# Patient Record
Sex: FEMALE | Race: BLACK OR AFRICAN AMERICAN | NOT HISPANIC OR LATINO | Employment: FULL TIME | URBAN - METROPOLITAN AREA
[De-identification: names, ages, dates, MRNs, and addresses within clinical notes are randomized per-mention and may not be internally consistent; named-entity substitution may affect disease eponyms.]

---

## 2018-09-24 ENCOUNTER — OFFICE VISIT (OUTPATIENT)
Dept: OPTOMETRY | Facility: CLINIC | Age: 38
End: 2018-09-24
Payer: COMMERCIAL

## 2018-09-24 DIAGNOSIS — H16.201 KERATOCONJUNCTIVITIS OF RIGHT EYE: Primary | ICD-10-CM

## 2018-09-24 PROCEDURE — 92002 INTRM OPH EXAM NEW PATIENT: CPT | Mod: S$GLB,,, | Performed by: OPTOMETRIST

## 2018-09-24 PROCEDURE — 99999 PR PBB SHADOW E&M-NEW PATIENT-LVL II: CPT | Mod: PBBFAC,,, | Performed by: OPTOMETRIST

## 2018-09-24 RX ORDER — FLUOROMETHOLONE 1 MG/ML
1 SUSPENSION/ DROPS OPHTHALMIC 4 TIMES DAILY
Qty: 5 ML | Refills: 0 | Status: SHIPPED | OUTPATIENT
Start: 2018-09-24 | End: 2018-09-28

## 2018-09-24 NOTE — PROGRESS NOTES
HPI     Patient states she woke up around 1:30am with fbs. Since then her right   eye began to swell, pain. 7/10    Last edited by Tracee Paz on 9/24/2018  1:39 PM. (History)            Assessment /Plan     For exam results, see Encounter Report.    Keratoconjunctivitis of right eye    Other orders  -     fluorometholone 0.1% (FML) 0.1 % DrpS; Place 1 drop into the right eye 4 (four) times daily. for 4 days  Dispense: 5 mL; Refill: 0     Cold compresses QID   RTC Friday for annula/DFE

## 2018-09-26 ENCOUNTER — LAB VISIT (OUTPATIENT)
Dept: LAB | Facility: HOSPITAL | Age: 38
End: 2018-09-26
Attending: OBSTETRICS & GYNECOLOGY
Payer: COMMERCIAL

## 2018-09-26 ENCOUNTER — TELEPHONE (OUTPATIENT)
Dept: ADMINISTRATIVE | Facility: HOSPITAL | Age: 38
End: 2018-09-26

## 2018-09-26 ENCOUNTER — OFFICE VISIT (OUTPATIENT)
Dept: OBSTETRICS AND GYNECOLOGY | Facility: CLINIC | Age: 38
End: 2018-09-26
Payer: COMMERCIAL

## 2018-09-26 VITALS
WEIGHT: 293 LBS | HEIGHT: 68 IN | BODY MASS INDEX: 44.41 KG/M2 | SYSTOLIC BLOOD PRESSURE: 150 MMHG | DIASTOLIC BLOOD PRESSURE: 80 MMHG

## 2018-09-26 DIAGNOSIS — Z01.419 WELL WOMAN EXAM WITH ROUTINE GYNECOLOGICAL EXAM: ICD-10-CM

## 2018-09-26 DIAGNOSIS — N93.9 ABNORMAL UTERINE BLEEDING (AUB): ICD-10-CM

## 2018-09-26 DIAGNOSIS — Z01.419 WELL WOMAN EXAM WITH ROUTINE GYNECOLOGICAL EXAM: Primary | ICD-10-CM

## 2018-09-26 LAB
ANISOCYTOSIS BLD QL SMEAR: ABNORMAL
BASOPHILS # BLD AUTO: 0.04 K/UL
BASOPHILS NFR BLD: 0.7 %
DIFFERENTIAL METHOD: ABNORMAL
EOSINOPHIL # BLD AUTO: 0.2 K/UL
EOSINOPHIL NFR BLD: 3.9 %
ERYTHROCYTE [DISTWIDTH] IN BLOOD BY AUTOMATED COUNT: 17.5 %
HCT VFR BLD AUTO: 29.1 %
HGB BLD-MCNC: 8.5 G/DL
HYPOCHROMIA BLD QL SMEAR: ABNORMAL
LYMPHOCYTES # BLD AUTO: 1.8 K/UL
LYMPHOCYTES NFR BLD: 32 %
MCH RBC QN AUTO: 20.5 PG
MCHC RBC AUTO-ENTMCNC: 29.2 G/DL
MCV RBC AUTO: 70 FL
MONOCYTES # BLD AUTO: 0.4 K/UL
MONOCYTES NFR BLD: 7.3 %
NEUTROPHILS # BLD AUTO: 3.1 K/UL
NEUTROPHILS NFR BLD: 56.1 %
PLATELET # BLD AUTO: 316 K/UL
PMV BLD AUTO: 9.2 FL
RBC # BLD AUTO: 4.14 M/UL
TSH SERPL DL<=0.005 MIU/L-ACNC: 0.91 UIU/ML
WBC # BLD AUTO: 5.59 K/UL

## 2018-09-26 PROCEDURE — 99999 PR PBB SHADOW E&M-EST. PATIENT-LVL III: CPT | Mod: PBBFAC,,, | Performed by: OBSTETRICS & GYNECOLOGY

## 2018-09-26 PROCEDURE — 84443 ASSAY THYROID STIM HORMONE: CPT

## 2018-09-26 PROCEDURE — 88142 CYTOPATH C/V THIN LAYER: CPT

## 2018-09-26 PROCEDURE — 86592 SYPHILIS TEST NON-TREP QUAL: CPT

## 2018-09-26 PROCEDURE — 86780 TREPONEMA PALLIDUM: CPT

## 2018-09-26 PROCEDURE — 85025 COMPLETE CBC W/AUTO DIFF WBC: CPT

## 2018-09-26 PROCEDURE — 86593 SYPHILIS TEST NON-TREP QUANT: CPT

## 2018-09-26 PROCEDURE — 80074 ACUTE HEPATITIS PANEL: CPT

## 2018-09-26 PROCEDURE — 87624 HPV HI-RISK TYP POOLED RSLT: CPT

## 2018-09-26 PROCEDURE — 99385 PREV VISIT NEW AGE 18-39: CPT | Mod: 1E,GY,S$GLB, | Performed by: OBSTETRICS & GYNECOLOGY

## 2018-09-26 PROCEDURE — 87491 CHLMYD TRACH DNA AMP PROBE: CPT

## 2018-09-26 PROCEDURE — 99212 OFFICE O/P EST SF 10 MIN: CPT | Mod: 25,S$GLB,, | Performed by: OBSTETRICS & GYNECOLOGY

## 2018-09-26 PROCEDURE — 36415 COLL VENOUS BLD VENIPUNCTURE: CPT

## 2018-09-26 PROCEDURE — 86703 HIV-1/HIV-2 1 RESULT ANTBDY: CPT

## 2018-09-26 PROCEDURE — 87660 TRICHOMONAS VAGIN DIR PROBE: CPT

## 2018-09-26 NOTE — PROGRESS NOTES
History & Physical  Gynecology      SUBJECTIVE:     Chief Complaint: Well Woman and Menometrorrhagia (period 2 to 3 weeks, are heavy goes through 3 bags of pads per period. )     History of Present Illness:  Annual Exam-Premenopausal  Ms. Piña is a 38 year old P3 who presents for annual exam. The patient complains of heavy menses. She reports that in 2017 she requested starting Depo Provera shots to control bleeding as she was amenorrheic on Depo Provera in the past. She reports that she bled for 3 months so she did not return for a second shot. She was given pills for one month to stop bleeding at this time. Her LMP was 2018 and bled for 6 days. She uses 3 pads at a time and changes pads every hour to 30 minutes without blood clots. She reports that she was told that she was anemic recently and believes that it is secondary to menses. She reports that she would like definitive management to stop bleeding The patient is sexually active with one partner. GYN screening history: last pap: was normal. The patient wears seatbelts: yes. The patient participates in regular exercise: no. Has the patient ever been transfused or tattooed?: no. The patient reports that there is not domestic violence in her life.    No family history of colon/Uterine/Cervical Cancer  Breast Cancer: Mom 60. Maternal Aunt 40. Maternal Great Aunt but age unknown.      Review of patient's allergies indicates:   Allergen Reactions    Depo-provera contraceptive Blisters       Past Medical History:   Diagnosis Date    Hypertension     Migraines      Past Surgical History:   Procedure Laterality Date     SECTION, LOW TRANSVERSE      x3    TUBAL LIGATION       OB History      Para Term  AB Living    3 3 3     3    SAB TAB Ectopic Multiple Live Births            3        Family History   Problem Relation Age of Onset    Breast cancer Mother     Breast cancer Maternal Aunt     Breast cancer Maternal Aunt       Social History     Tobacco Use    Smoking status: Current Every Day Smoker    Smokeless tobacco: Never Used   Substance Use Topics    Alcohol use: Yes     Frequency: Monthly or less     Comment: social    Drug use: Yes     Types: Marijuana     Comment: very rare        Current Outpatient Medications   Medication Sig    ferrous sulfate, dried (IRON, DRIED, ORAL) Take 2 tablets by mouth once daily.    fluorometholone 0.1% (FML) 0.1 % DrpS Place 1 drop into the right eye 4 (four) times daily. for 4 days    LISINOPRIL ORAL Take 1 tablet by mouth 2 (two) times daily.    METOPROLOL TARTRATE ORAL Take 1 tablet by mouth once daily.    metaproterenol (ALUPENT) 10 mg tablet Take 10 mg by mouth 3 (three) times daily.     No current facility-administered medications for this visit.      Review of Systems:  Review of Systems   Constitutional: Negative for appetite change, chills, fever and unexpected weight change.   Eyes: Negative for visual disturbance.   Respiratory: Negative for cough and wheezing.    Cardiovascular: Negative for chest pain and palpitations.   Gastrointestinal: Negative for abdominal pain and vomiting.   Endocrine: Negative for diabetes.   Genitourinary: Positive for menorrhagia, menstrual problem and vaginal bleeding. Negative for dysuria, frequency, hematuria, pelvic pain, vaginal discharge and vaginal pain.   Skin:         Skin changes with depo provera   Neurological: Negative for headaches.      OBJECTIVE:     Physical Exam:  Physical Exam   Constitutional: She is oriented to person, place, and time. She appears well-developed and well-nourished.   Cardiovascular: Normal rate and normal heart sounds.   Pulmonary/Chest: Effort normal. No respiratory distress. Breasts are symmetrical. There is no breast swelling.   Abdominal: Soft. She exhibits no distension. There is no tenderness.   Genitourinary: Rectum normal and uterus normal. No breast tenderness, discharge or bleeding. Pelvic exam was  performed with patient supine. Cervix exhibits no motion tenderness. Right adnexum displays no fullness. Left adnexum displays no fullness. No bleeding in the vagina. No vaginal discharge found.   Genitourinary Comments: Uterus approximately 10 week size, mobile; difficult  2/2 body habitus with cervix anterior and deep   Neurological: She is oriented to person, place, and time.   Skin: Skin is warm and dry.   Psychiatric: She has a normal mood and affect.   Nursing note and vitals reviewed.    ASSESSMENT:       ICD-10-CM ICD-9-CM    1. Well woman exam with routine gynecological exam Z01.419 V72.31 Liquid-based pap smear, screening      HPV High Risk Genotypes, PCR      HIV-1 and HIV-2 antibodies      RPR      Hepatitis panel, acute      C. trachomatis/N. gonorrhoeae by AMP DNA      Vaginosis Screen by DNA Probe   2. Abnormal uterine bleeding (AUB) N93.9 626.9 US Transvaginal Non OB      TSH      CBC auto differential      Plan:     Bijal was seen today for well woman and menometrorrhagia.    Diagnoses and all orders for this visit:    Well woman exam with routine gynecological exam  -     Liquid-based pap smear, screening  -     HPV High Risk Genotypes, PCR  -     HIV-1 and HIV-2 antibodies; Future  -     RPR; Future  -     Hepatitis panel, acute; Future  -     C. trachomatis/N. gonorrhoeae by AMP DNA  -     Vaginosis Screen by DNA Probe    Abnormal uterine bleeding (AUB)  -     US Transvaginal Non OB; Future  -     TSH; Future  -     CBC auto differential; Future  - Will have patient return to clinic for EMBx. If EMBx cannot be done in office then will perform dx hysteroscopy D&C in OR.  - Patient >35 y.o. with HTN and smokes so would not put patient on estrogen based therapies. S/p tubal ligation and if tissue dx is negative will discuss endometrial ablation vs. hysterectomy.        Orders Placed This Encounter   Procedures    HPV High Risk Genotypes, PCR    C. trachomatis/N. gonorrhoeae by AMP DNA     Vaginosis Screen by DNA Probe    US Transvaginal Non OB    TSH    CBC auto differential    HIV-1 and HIV-2 antibodies    RPR    Hepatitis panel, acute       Follow-up in about 1 week (around 10/3/2018) for endometrial biopsy.    Counseling time: 30 minutes    Margaret Alexis

## 2018-09-26 NOTE — PATIENT INSTRUCTIONS
Heavy Menstrual Bleeding    Heavy menstrual bleeding means that your periods are heavier or longer than usual. You may soak through a pad or tampon every 1 to 3 hours on the heaviest days of your period. You may also pass large, dark clots. And your periods may last longer than 7 days.  If you have heavy periods often, this can cause a problem called anemia. With anemia, your red blood cell count is too low. Red blood cells are needed because they help carry oxygen throughout your body. Severe anemia may cause you to look pale and feel weak or tired. You might also become short of breath easily.  There are many possible causes of heavy menstrual bleeding. Hormonal imbalance is the most common cause. Having benign growths in your uterus, such as fibroids or polyps, is another cause. Taking certain medicines or having certain health problems or bleeding disorders are also causes.  To treat heavy menstrual bleeding, medicines are often tried first. If these dont help, further testing and treatments will likely be needed.  Home care  Medicines  If youre prescribed medicines, be sure to take them as directed.  · To help control heavy bleeding, any of the following may be used:  ¨ Hormone therapy (this includes all methods of hormonal birth control such as pills, shots, cream, ring, patch, or hormone-releasing IUD)  ¨ Nonsteroidal anti-inflammatory drugs (NSAIDs), such as ibuprofen  ¨ Antifibrinolytic medicines, such as transexamic acid  · To help treat anemia, iron supplements may be prescribed.            General care  · Get plenty of rest if you tire easily. Avoid heavy exertion.  · To help relieve pain or cramping, try using a heating pad on the lower belly or back. A warm bath may also help.  Follow-up care  Follow up with your healthcare provider as directed.  When to seek medical advice  Call your healthcare provider right away if any of these occur:  · Heavier bleeding (soaking 1 pad or tampon every hour for 3  hours)  · Heavy bleeding that lasts longer than 1 week  · Fever of 100.4ºF (38ºC) or higher, or as directed by your provider  · Pain or cramping that gets worse instead of better  · Signs of anemia such as pale skin, extreme fatigue or weakness, or shortness of breath  · Dizziness or fainting  Date Last Reviewed: 6/11/2015  © 5544-2666 [employer]. [address] [place] [address], [place] [address]. All rights reserved. This information is not intended as a substitute for professional medical care. Always follow your healthcare professional's instructions.        Prevention Guidelines, Women Ages 18 to 39  Screening tests and vaccines are an important part of managing your health. Health counseling is essential, too. Below are guidelines for these, for women ages 18 to 39. Talk with your healthcare provider to make sure youre up-to-date on what you need.  Screening Who needs it How often   Alcohol misuse All women in this age group At routine exams   Blood pressure All women in this age group Every 2 years if your blood pressure is less than 120/80 mm Hg; yearly if your systolic blood pressure is 120 to 139 mm Hg, or your diastolic blood pressure reading is 80 to 89 mm Hg   Breast cancer All women in this age group should talk with their healthcare providers about the need for clinical breast exams (CBE)1 Clinical breast exam every 3 years1   Cervical cancer Women ages 21 and older Women between ages 21 and 29 should have a Pap test every 3 years; women between ages 30 and 65 are advised to have a Pap test plus an HPV test every 5 years   Chlamydia Sexually active women ages 24 and younger, and women at increased risk for infection Every 3 years if you're at risk or have symptoms   Depression All women in this age group At routine exams   Diabetes mellitus, type 2 Adults with no symptoms who are overweight or obese and have 1 or more other risk factors for diabetes At least every 3 years   Gonorrhea Sexually  active women at increased risk for infection At routine exams   Hepatitis C Anyone at increased risk At routine exams   HIV All women At routine exams   Obesity All women in this age group At routine exams   Syphilis Women at increased risk for infection - talk with your healthcare provider At routine exams   Tuberculosis Women at increased risk for infection - talk with your healthcare provider Ask your healthcare provider   Vision All women in this age group At least 1 complete exam in your 20s, and 2 in your 30s   Vaccine2 Who needs it How often   Chickenpox (varicella) All women in this age group who have no record of this infection or vaccine 2 doses; the second dose should be given 4 to 8 weeks after the first dose   Hepatitis A Women at increased risk for infection - talk with your healthcare provider 2 doses given at least 6 months apart   Hepatitis B Women at increased risk for infection - talk with your healthcare provider 3 doses over 6 months; second dose should be given 1 month after the first dose; the third dose should be given at least 2 months after the second dose and at least 4 months after the first dose   Haemophilus influenzae Type B (HIB) Women at increased risk for infection - talk with your healthcare provider 1 to 3 doses   Human papillomavirus (HPV) All women in this age group up to age 26 3 doses; the second dose should be given 1 to 2 months after the first dose and the third dose given 6 months after the first dose   Influenza (flu) All women in this age group Once a year   Measles, mumps, rubella (MMR) All women in this age group who have no record of these infections or vaccines 1 or 2 doses   Meningococcal Women at increased risk for infection - talk with your healthcare provider 1 or more doses   Pneumococcal conjugate vaccine (PCV13) and pneumococcal polysaccharide vaccine (PPSV23) Women at increased risk for infection - talk with your healthcare provider PCV13: 1 dose ages 19 to  65 (protects against 13 types of pneumococcal bacteria)  PPSV23: 1 to 2 doses through age 64, or 1 dose at 65 or older (protects against 23 types of pneumococcal bacteria)      Tetanus/diphtheria/pertussis (Td/Tdap) booster All women in this age group Td every 10 years, or a one-time dose of Tdap instead of a Td booster after age 18, then Td every 10 years   Counseling Who needs it How often   BRCA gene mutation testing for breast and ovarian cancer susceptibility Women with increased risk for having gene mutation When your risk is known   Breast cancer and chemoprevention Women at high risk for breast cancer When your risk is known   Diet and exercise Women who are overweight or obese When diagnosed, and then at routine exams   Domestic violence Women at the age in which they are able to have children At routine exams   Sexually transmitted infection prevention Women who are sexually active At routine exams   Skin cancer Prevention of skin cancer in fair-skinned adults through age 24 At routine exams   Use of tobacco and the health effects it can cause All women in this age group Every visit   1According to the ACS, women ages 20 to 39 years should have a clinical breast exam (CBE) as part of their routine health exam every 3 years. Breast self-exams are an option for women starting in their 20s. But the  USPSTF does not recommend CBE.  2Those who are 18 years old and not up-to-date on their childhood vaccines should get all appropriate catch-up vaccines recommended by the CDC.  Date Last Reviewed: 8/27/2015  © 4616-1639 [employer]. [address] [place] [address], [place] [address]. All rights reserved. This information is not intended as a substitute for professional medical care. Always follow your healthcare professional's instructions.

## 2018-09-27 LAB
C TRACH DNA SPEC QL NAA+PROBE: NOT DETECTED
CANDIDA RRNA VAG QL PROBE: NEGATIVE
G VAGINALIS RRNA GENITAL QL PROBE: POSITIVE
HAV IGM SERPL QL IA: NEGATIVE
HBV CORE IGM SERPL QL IA: NEGATIVE
HBV SURFACE AG SERPL QL IA: NEGATIVE
HCV AB SERPL QL IA: NEGATIVE
HIV 1+2 AB+HIV1 P24 AG SERPL QL IA: NEGATIVE
N GONORRHOEA DNA SPEC QL NAA+PROBE: NOT DETECTED
RPR SER QL: REACTIVE
RPR SER-TITR: ABNORMAL {TITER}
T VAGINALIS RRNA GENITAL QL PROBE: NEGATIVE

## 2018-09-28 ENCOUNTER — OFFICE VISIT (OUTPATIENT)
Dept: OPTOMETRY | Facility: CLINIC | Age: 38
End: 2018-09-28
Payer: COMMERCIAL

## 2018-09-28 DIAGNOSIS — H47.10 OPTIC NERVE EDEMA: Primary | ICD-10-CM

## 2018-09-28 DIAGNOSIS — H47.10 EDEMA OF OPTIC DISC OF RIGHT EYE: Primary | ICD-10-CM

## 2018-09-28 DIAGNOSIS — H35.031 HYPERTENSIVE RETINOPATHY OF RIGHT EYE: ICD-10-CM

## 2018-09-28 DIAGNOSIS — H52.223 REGULAR ASTIGMATISM OF BOTH EYES: ICD-10-CM

## 2018-09-28 DIAGNOSIS — H04.123 DRY EYE SYNDROME, BILATERAL: ICD-10-CM

## 2018-09-28 PROCEDURE — 92250 FUNDUS PHOTOGRAPHY W/I&R: CPT | Mod: S$GLB,,, | Performed by: OPTOMETRIST

## 2018-09-28 PROCEDURE — 99999 PR PBB SHADOW E&M-EST. PATIENT-LVL II: CPT | Mod: PBBFAC,,, | Performed by: OPTOMETRIST

## 2018-09-28 PROCEDURE — 92015 DETERMINE REFRACTIVE STATE: CPT | Mod: S$GLB,,, | Performed by: OPTOMETRIST

## 2018-09-28 PROCEDURE — 92014 COMPRE OPH EXAM EST PT 1/>: CPT | Mod: S$GLB,,, | Performed by: OPTOMETRIST

## 2018-09-28 NOTE — PROGRESS NOTES
"HPI     38yr old female present for Annual DFE. Patient complains when around   bright lights, it feels like a "stabbing pain in her eyes" x4-5 months.   Patient states constant headaches causing blurry vision OU. Pt says she   feel pressure on the back of her eyes, with photophobia. Pt has history of   Keratoconjunctivitis OD. Pt denies floaters and flashes. Pt using FML   Q2H-OD and AT's Q2H-OD.     Last edited by Tobin Kellogg MA on 9/28/2018  9:16 AM. (History)            Assessment /Plan     For exam results, see Encounter Report.    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 Colvin, appt 10/3    Hypertensive retinopathy of right eye   Dot hemes OD    Dry eye syndrome, bilateral   Use artificial tears BID/PRN daily     Astigmatism OU   Rx specs  RTC 1 year                     "

## 2018-10-01 ENCOUNTER — TELEPHONE (OUTPATIENT)
Dept: OBSTETRICS AND GYNECOLOGY | Facility: CLINIC | Age: 38
End: 2018-10-01

## 2018-10-01 LAB — T PALLIDUM AB SER QL IF: REACTIVE

## 2018-10-01 NOTE — TELEPHONE ENCOUNTER
Called patient to inform her of test results. Patient has a RPR 1:8 with reactive FTB-Abs. Patient reports that she was diagnosed with syphilis 14 years ago. She was treated then and 5 months ago she and her fiancee were treated again. She remembers having 3 injections and thinks that her RPR was 1:8 at that time. She was seen by [facility]. Will sign records release at follow up visit.      Margaret Kitchen MD

## 2018-10-03 ENCOUNTER — INITIAL CONSULT (OUTPATIENT)
Dept: OPHTHALMOLOGY | Facility: CLINIC | Age: 38
End: 2018-10-03
Payer: COMMERCIAL

## 2018-10-03 ENCOUNTER — CLINICAL SUPPORT (OUTPATIENT)
Dept: OPHTHALMOLOGY | Facility: CLINIC | Age: 38
End: 2018-10-03
Payer: COMMERCIAL

## 2018-10-03 ENCOUNTER — PROCEDURE VISIT (OUTPATIENT)
Dept: OBSTETRICS AND GYNECOLOGY | Facility: CLINIC | Age: 38
End: 2018-10-03
Payer: COMMERCIAL

## 2018-10-03 VITALS
DIASTOLIC BLOOD PRESSURE: 80 MMHG | WEIGHT: 293 LBS | SYSTOLIC BLOOD PRESSURE: 130 MMHG | BODY MASS INDEX: 44.41 KG/M2 | HEIGHT: 68 IN

## 2018-10-03 DIAGNOSIS — N93.9 ABNORMAL UTERINE BLEEDING (AUB): Primary | ICD-10-CM

## 2018-10-03 DIAGNOSIS — H53.423 SCOTOMA OF BLIND SPOT AREA OF BOTH EYES: ICD-10-CM

## 2018-10-03 DIAGNOSIS — H47.11 PAPILLEDEMA ASSOCIATED WITH INCREASED INTRACRANIAL PRESSURE: ICD-10-CM

## 2018-10-03 LAB
HPV HR 12 DNA CVX QL NAA+PROBE: NEGATIVE
HPV16 AG SPEC QL: NEGATIVE
HPV18 DNA SPEC QL NAA+PROBE: NEGATIVE

## 2018-10-03 PROCEDURE — 99999 PR PBB SHADOW E&M-EST. PATIENT-LVL III: CPT | Mod: PBBFAC,,, | Performed by: OPHTHALMOLOGY

## 2018-10-03 PROCEDURE — 58100 BIOPSY OF UTERUS LINING: CPT | Mod: S$GLB,,, | Performed by: OBSTETRICS & GYNECOLOGY

## 2018-10-03 PROCEDURE — 88305 TISSUE EXAM BY PATHOLOGIST: CPT | Mod: 26,,, | Performed by: PATHOLOGY

## 2018-10-03 PROCEDURE — 92083 EXTENDED VISUAL FIELD XM: CPT | Mod: S$GLB,,, | Performed by: OPHTHALMOLOGY

## 2018-10-03 PROCEDURE — 92014 COMPRE OPH EXAM EST PT 1/>: CPT | Mod: S$GLB,,, | Performed by: OPHTHALMOLOGY

## 2018-10-03 PROCEDURE — 88305 TISSUE EXAM BY PATHOLOGIST: CPT | Performed by: PATHOLOGY

## 2018-10-03 RX ORDER — ACETAZOLAMIDE 500 MG/1
500 CAPSULE, EXTENDED RELEASE ORAL 2 TIMES DAILY
Qty: 60 CAPSULE | Refills: 5 | Status: SHIPPED | OUTPATIENT
Start: 2018-10-03 | End: 2019-01-28 | Stop reason: SDUPTHER

## 2018-10-03 NOTE — PROCEDURES
Biopsy (Gynecological)  Date/Time: 10/3/2018 11:46 AM  Performed by: Margaret Alexis MD  Authorized by: Margaret Alexis MD     Consent Done?:  Yes (Verbal)  Local anesthesia used?: No      Biopsy Location:  Uterus  Estimated blood loss (cc):  5   Patient tolerated the procedure well with no immediate complications.     DATE: October 3rd, 2018    TIME: 11:46  PM    PROCEDURE: Endometrial biopsy    INDICATION: amenorrhea    PATIENT CONSENT:     PRE ENDOMETRIAL BIOPSY COUNSELING:  The patient was informed of the risk of bleeding, infection, uterine perforation and pain and that the test will rule-out endometrial cancer with accuracy greater than 95%. She was counseled on the alternatives to endometrial biopsy. All of her questions were answered.  Patient gives verbal consent    ANESTHESIA: None    Labs: UPT performed prior to the procedure was negative.    PROCEDURE NOTE:  The cervix was visualized with a speculum and swabbed with Betadinex3.  The anterior lip of the cervix was grasped with a single toothed tenaculum, and a sterile endometrial pipelle was inserted into the uterus which is enlarged and unable to sound as uterus was larger than the length of the pipelle. Procedure was difficult 2/2 anteriorly displaced cervix and body habitus. 3 passes were made with the pipelle and scant amount of tissue was obtained. The specimen was placed in formalin and sent to Pathology for evaluation.     COMPLICATIONS: None    DISPOSITION: The patient tolerated the above procedure fairly well.      POST ENDOMETRIAL BIOPSY COUNSELING:  - Manage post biopsy cramping with NSAIDs or Tylenol.  - Expect spotting or light bleeding for a few days.  - Report bleeding heavier than a period, fever > 101.0 F, worsening pain or a foul  smelling vaginal discharge.      Margaret Alexis MD 10/03/2018 12:45 PM

## 2018-10-03 NOTE — LETTER
October 3, 2018      Lety Armenta, OD  [address]  [facility] [address]           [facility]  [address]  [facility] [address]  Phone: [phone]  Fax: [phone]          Patient: Bijal Molina   MR Number: [id number]   YOB: 1980   Date of Visit: 10/3/2018       Dear Dr. Lety Armenta:    Thank you for referring Bijal Molina to me for evaluation. Attached you will find relevant portions of my assessment and plan of care.    If you have questions, please do not hesitate to call me. I look forward to following Bijal Molina along with you.    Sincerely,    Dane Colvin MD    Enclosure  CC:  No Recipients    If you would like to receive this communication electronically, please contact [email or web address] or [phone] to request more information on [employer] Link access.    For providers and/or their staff who would like to refer a patient to [facility], please contact us through our one-stop-shop provider referral line, [facility], at [phone].    If you feel you have received this communication in error or would no longer like to receive these types of communications, please e-mail [email or web address]

## 2018-10-03 NOTE — PROGRESS NOTES
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  Patient states OD eye pain consistently past 2-3 weeks.  Patient states OU vision blurry at dist, but just received an RX from   .  OU light sensitivity.  9 on pain scale.(eyes)    I have personally interviewed the patient, reviewed the history and   examined the patient and agree with the technician's exam. Dr. Armenta   noted bilateral optic disc edema.          Last edited by Dane Colvin MD on 10/3/2018  1:57 PM. (History)            Assessment /Plan     For exam results, see Encounter Report.    Papilledema associated with increased intracranial pressure  -     Rasmussen Visual Field - OU - Extended - Both Eyes  -     MRI Brain W WO Contrast; Future; Expected date: 10/03/2018  -     MRV Brain Without Contrast; Future; Expected date: 10/03/2018  -     acetaZOLAMIDE (DIAMOX) 500 mg CpSR; Take 1 capsule (500 mg total) by mouth 2 (two) times daily.  Dispense: 60 capsule; Refill: 5    Scotoma of blind spot area of both eyes  -     Rasmussen Visual Field - OU - Extended - Both Eyes  -     MRI Brain W WO Contrast; Future; Expected date: 10/03/2018  -     MRV Brain Without Contrast; Future; Expected date: 10/03/2018  -     acetaZOLAMIDE (DIAMOX) 500 mg CpSR; Take 1 capsule (500 mg total) by mouth 2 (two) times daily.  Dispense: 60 capsule; Refill: 5      Ms. Molina most likely has idiopathic intracranial hypertension. I ordered the imaging studies to rule out an intracranial mass and dural venous sinus stenosis. If the scans are normal, I will order a lumbar puncture. I will repeat her exam and visual field testing in one month.

## 2018-10-03 NOTE — PATIENT INSTRUCTIONS
Take capsules one in the morning and one in the evening.  MRI and MRV as scheduled.   Repeat exam and visual field testing in one month.

## 2018-10-03 NOTE — PATIENT INSTRUCTIONS
Endometrial Biopsy    Endometrial biopsy is a procedure used to study the endometrium (lining of the uterus). It is usually done in your health care providers office. During the biopsy, small tissue samples are taken from inside the uterus. These are then sent to a lab for study. If any problems are found, you and your health care provider will discuss treatment options. The biopsy usually takes only a few minutes, and you can often go back to your normal routine as soon as the procedure is over.  Reasons for the procedure  Endometrial biopsy may help pinpoint the cause of certain problems. These include:  · Bleeding after menopause  · Heavy or irregular menstrual periods  · Bleeding associated with hormone therapy  · Prolonged bleeding  · Abnormal Pap test results  · Having certain types of cancer  · Trouble getting pregnant (fertility problems)  What are the risks?  Problems with endometrial biopsy are rare, but can include:  · Bleeding  · Infection  · Damage to the uterine wall (very rare)  Getting ready for the procedure  Your health care provider will ask about your health and any medicines you take, like blood thinners. Before your biopsy, you may have tests to make sure youre not pregnant or have an infection. You may also be asked to sign a consent form. A day or 2 before the procedure:   · Avoid using creams or other vaginal medicines.  · Avoid douching.  · Ask your health care provider if you should take pain medicines shortly before the test.  During the biopsy  During the biopsy, you will likely experience the following:  · You will be asked to lie on an exam table with your knees bent, just as you do for a Pap test.  · You may have a brief pelvic exam. An instrument called a speculum is then inserted into the vagina to hold it open.  · An antiseptic solution may be applied to the cervix. The cervix may also be numbed with an anesthetic or dilated to widen the opening.  · A small tube is passed  through the cervix into the uterus.  · It is normal to feel some cramping when the tube is inserted. But tell your health care provider if you have severe cramping or are very uncomfortable.  · Using mild suction, samples are taken from the uterine lining. You may feel pinching or additional cramping when this is done.  · The tube and speculum are then removed and the samples are sent to a lab for study.  After the procedure  After the procedure, you may experience the following:  · If you feel lightheaded or dizzy, you can rest on the table until youre ready to get dressed.  · For a few hours, you may feel some mild cramping. This can usually be relieved with over-the-counter pain medicines.  · You may have some bleeding for a few days. Use pads instead of tampons.  · Dont douche or use any vaginal medicines unless your health care provider says its OK.  · Ask your health care provider when its OK to have sex again.  Follow-up care  It will take about a week for the biopsy results to come back from the lab. Then you and your health care provider can discuss the results. These may show that no treatment is required. Or, you may be scheduled for a follow-up appointment and more tests. If your biopsy was done for fertility problems, be sure to record the day when your next period begins.     Call your health care provider   Contact your health care provider if you have any of the following:  · Heavy bleeding (more than a pad an hour for 2 hours).  · Severe cramping or increasing pain.  · Fever over 100.4°F (38.0°C)  · Foul-smelling or unusual vaginal discharge.   Date Last Reviewed: 5/10/2015  © 0192-1913 [employer]. [address] [place] [address], [place] [address]. All rights reserved. This information is not intended as a substitute for professional medical care. Always follow your healthcare professional's instructions.

## 2018-10-04 ENCOUNTER — HOSPITAL ENCOUNTER (OUTPATIENT)
Dept: RADIOLOGY | Facility: HOSPITAL | Age: 38
Discharge: HOME | End: 2018-10-04
Attending: OPHTHALMOLOGY
Payer: COMMERCIAL

## 2018-10-04 DIAGNOSIS — H47.11 PAPILLEDEMA ASSOCIATED WITH INCREASED INTRACRANIAL PRESSURE: ICD-10-CM

## 2018-10-04 DIAGNOSIS — H53.423 SCOTOMA OF BLIND SPOT AREA OF BOTH EYES: ICD-10-CM

## 2018-10-05 ENCOUNTER — HOSPITAL ENCOUNTER (OUTPATIENT)
Dept: RADIOLOGY | Facility: HOSPITAL | Age: 38
Discharge: HOME | End: 2018-10-05
Attending: OBSTETRICS & GYNECOLOGY
Payer: COMMERCIAL

## 2018-10-05 DIAGNOSIS — N93.9 ABNORMAL UTERINE BLEEDING (AUB): ICD-10-CM

## 2018-10-05 PROCEDURE — 76856 US EXAM PELVIC COMPLETE: CPT | Mod: 26,,, | Performed by: RADIOLOGY

## 2018-10-05 PROCEDURE — 76856 US EXAM PELVIC COMPLETE: CPT | Mod: TC

## 2018-10-10 ENCOUNTER — TELEPHONE (OUTPATIENT)
Dept: FAMILY MEDICINE | Facility: CLINIC | Age: 38
End: 2018-10-10

## 2018-10-10 ENCOUNTER — OFFICE VISIT (OUTPATIENT)
Dept: FAMILY MEDICINE | Facility: CLINIC | Age: 38
End: 2018-10-10
Payer: COMMERCIAL

## 2018-10-10 VITALS
BODY MASS INDEX: 44.41 KG/M2 | OXYGEN SATURATION: 99 % | HEIGHT: 68 IN | RESPIRATION RATE: 17 BRPM | SYSTOLIC BLOOD PRESSURE: 132 MMHG | WEIGHT: 293 LBS | DIASTOLIC BLOOD PRESSURE: 82 MMHG | TEMPERATURE: 99 F | HEART RATE: 87 BPM

## 2018-10-10 DIAGNOSIS — E66.01 MORBID OBESITY: ICD-10-CM

## 2018-10-10 DIAGNOSIS — G44.40 HEADACHE, REBOUND: ICD-10-CM

## 2018-10-10 DIAGNOSIS — G44.221 CHRONIC TENSION-TYPE HEADACHE, INTRACTABLE: Primary | ICD-10-CM

## 2018-10-10 DIAGNOSIS — I10 HYPERTENSION, ESSENTIAL: ICD-10-CM

## 2018-10-10 DIAGNOSIS — G93.2 PSEUDOTUMOR CEREBRI: ICD-10-CM

## 2018-10-10 PROCEDURE — 99999 PR PBB SHADOW E&M-EST. PATIENT-LVL IV: CPT | Mod: PBBFAC,,, | Performed by: INTERNAL MEDICINE

## 2018-10-10 PROCEDURE — 99214 OFFICE O/P EST MOD 30 MIN: CPT | Mod: S$GLB,,, | Performed by: INTERNAL MEDICINE

## 2018-10-10 RX ORDER — DIAZEPAM 10 MG/1
TABLET ORAL
Qty: 1 TABLET | Refills: 0 | Status: SHIPPED | OUTPATIENT
Start: 2018-10-10 | End: 2018-10-22

## 2018-10-10 RX ORDER — BUTALBITAL, ACETAMINOPHEN AND CAFFEINE 50; 325; 40 MG/1; MG/1; MG/1
1 TABLET ORAL EVERY 4 HOURS PRN
Qty: 60 TABLET | Refills: 1 | Status: ON HOLD | OUTPATIENT
Start: 2018-10-10 | End: 2018-11-08 | Stop reason: HOSPADM

## 2018-10-10 NOTE — TELEPHONE ENCOUNTER
Patient requesting a medication called in for her anxiety for upcoming MRI. Patient's appointment: 10/17/18 @4pm. Please advise.

## 2018-10-10 NOTE — PATIENT INSTRUCTIONS
Goal to decrease use of aleve    Can use fioricet as needed every six hours    For next week take aleve no more than three times in a day    The following week take aleve no more than twice per day    And finally aleve no more then once per day

## 2018-10-11 NOTE — PROGRESS NOTES
Subjective:       Patient ID: Bijal Molina is a 38 y.o. female.    Chief Complaint: Establish Care    Est pcp    HPI: 39 y/o w/ HTN morbid obesity tobacco dependence presents to establish PCP. She reports daily bitemporal headaches for over one year, have been worsening in severity for last three months associated nausea and photophobia no kizzy no vision loss of diplopia no parathesia or motor weakness. She reports taking aleve three to four times daily with increasing frequency over last two months. She gets temporary relief with return of headache within three hours. She recently had ophtho evaluation with evidence of papilledema she was started on acetazolamide. seh is taking this twice daily she reports tingling to feet and hands since starting this medication. No change in headaches. She was unable to get MRI of brain due to claustrophobia      Review of Systems   Constitutional: Negative for activity change, appetite change, fatigue, fever and unexpected weight change.   HENT: Negative for ear pain, rhinorrhea and sore throat.    Eyes: Negative for discharge and visual disturbance.   Respiratory: Negative for chest tightness, shortness of breath and wheezing.    Cardiovascular: Negative for chest pain, palpitations and leg swelling.   Gastrointestinal: Negative for abdominal pain, constipation and diarrhea.   Endocrine: Negative for cold intolerance and heat intolerance.   Genitourinary: Negative for dysuria and hematuria.   Musculoskeletal: Negative for joint swelling and neck stiffness.   Skin: Negative for rash.   Neurological: Positive for headaches. Negative for dizziness, syncope and weakness.   Psychiatric/Behavioral: Negative for suicidal ideas.       Objective:     Vitals:    10/10/18 1515   BP: 132/82   BP Location: Right arm   Patient Position: Sitting   BP Method: Large (Manual)   Pulse: 87   Resp: 17   Temp: 98.7 °F (37.1 °C)   TempSrc: Oral   SpO2: 99%   Weight: (!) 161.3 kg (355 lb 9.6 oz)  "  Height: 5' 8" (1.727 m)          Physical Exam   Constitutional: She is oriented to person, place, and time. She appears well-developed and well-nourished.   HENT:   Head: Normocephalic and atraumatic.   Eyes: Conjunctivae are normal. Pupils are equal, round, and reactive to light.   Neck: Normal range of motion.   Cardiovascular: Normal rate and regular rhythm. Exam reveals no gallop and no friction rub.   No murmur heard.  Pulmonary/Chest: Effort normal and breath sounds normal. She has no wheezes. She has no rales.   Abdominal: Soft. Bowel sounds are normal. There is no tenderness. There is no rebound and no guarding.   Musculoskeletal: Normal range of motion. She exhibits no edema or tenderness.   Neurological: She is alert and oriented to person, place, and time. No cranial nerve deficit.   Negative pronator drift, 5/5 distal motor strength bilaterally normal gait observed   Skin: Skin is warm and dry.   Psychiatric: She has a normal mood and affect.       Assessment and Plan   1. Chronic tension-type headache, intractable  Suspect at least some component is rebound from chronic NSAID use discuss tapering nsaid over next two weeks using fiorocet instead for acute headache pain  - Ambulatory Referral to Neurology  - butalbital-acetaminophen-caffeine -40 mg (FIORICET, ESGIC) -40 mg per tablet; Take 1 tablet by mouth every 4 (four) hours as needed for Pain.  Dispense: 60 tablet; Refill: 1    2. Headache, rebound  As abvoe    3. Pseudotumor cerebri  Diazepam for MRI referral to neurology for consideration of LP  - Ambulatory Referral to Neurology    4. Morbid obesity  The patient is asked to make an attempt to improve diet and exercise patterns to aid in medical management of this problem.      5. Hypertension, essential  Continue lisinopril recent labs reviewed normal renal function  "

## 2018-10-12 ENCOUNTER — LAB VISIT (OUTPATIENT)
Dept: LAB | Facility: HOSPITAL | Age: 38
End: 2018-10-12
Attending: OBSTETRICS & GYNECOLOGY
Payer: COMMERCIAL

## 2018-10-12 ENCOUNTER — OFFICE VISIT (OUTPATIENT)
Dept: OBSTETRICS AND GYNECOLOGY | Facility: CLINIC | Age: 38
End: 2018-10-12
Payer: COMMERCIAL

## 2018-10-12 VITALS
SYSTOLIC BLOOD PRESSURE: 126 MMHG | WEIGHT: 293 LBS | DIASTOLIC BLOOD PRESSURE: 80 MMHG | HEIGHT: 68 IN | BODY MASS INDEX: 44.41 KG/M2

## 2018-10-12 DIAGNOSIS — Z01.818 PREOP EXAMINATION: ICD-10-CM

## 2018-10-12 DIAGNOSIS — N85.2 ENLARGED UTERUS: Primary | ICD-10-CM

## 2018-10-12 PROCEDURE — 99999 PR PBB SHADOW E&M-EST. PATIENT-LVL III: CPT | Mod: PBBFAC,,, | Performed by: OBSTETRICS & GYNECOLOGY

## 2018-10-12 PROCEDURE — 36415 COLL VENOUS BLD VENIPUNCTURE: CPT

## 2018-10-12 PROCEDURE — 99213 OFFICE O/P EST LOW 20 MIN: CPT | Mod: S$GLB,,, | Performed by: OBSTETRICS & GYNECOLOGY

## 2018-10-12 PROCEDURE — 83036 HEMOGLOBIN GLYCOSYLATED A1C: CPT

## 2018-10-12 NOTE — PROGRESS NOTES
History & Physical  Gynecology      SUBJECTIVE:     Chief Complaint: Consult (discuss surgery )       History of Present Illness:  Ms. Piña is a 38 year old P3 who presents for f/u s/p TVUS and EMBx for AUB. The patient complains of heavy menses. Her LMP was 2018 and bled for 6 days. She uses 3 pads at a time and changes pads every hour to 30 minutes without blood clots. She reports that she was told that she was anemic recently and believes that it is secondary to menses. She reports that she would like definitive management to stop bleeding         Review of patient's allergies indicates:   Allergen Reactions    Depo-provera contraceptive Blisters       Past Medical History:   Diagnosis Date    Hypertension     Migraines      Past Surgical History:   Procedure Laterality Date     SECTION, LOW TRANSVERSE      x3    TUBAL LIGATION       OB History      Para Term  AB Living    3 3 3     3    SAB TAB Ectopic Multiple Live Births            3        Family History   Problem Relation Age of Onset    Breast cancer Mother     Breast cancer Maternal Aunt     Breast cancer Maternal Aunt      Social History     Tobacco Use    Smoking status: Current Every Day Smoker    Smokeless tobacco: Never Used   Substance Use Topics    Alcohol use: Yes     Frequency: Monthly or less     Comment: social    Drug use: Yes     Types: Marijuana     Comment: very rare        Current Outpatient Medications   Medication Sig    acetaZOLAMIDE (DIAMOX) 500 mg CpSR Take 1 capsule (500 mg total) by mouth 2 (two) times daily.    butalbital-acetaminophen-caffeine -40 mg (FIORICET, ESGIC) -40 mg per tablet Take 1 tablet by mouth every 4 (four) hours as needed for Pain.    ferrous sulfate, dried (IRON, DRIED, ORAL) Take 2 tablets by mouth once daily.    LISINOPRIL ORAL Take 1 tablet by mouth 2 (two) times daily.    metaproterenol (ALUPENT) 10 mg tablet Take 10 mg by mouth 3 (three) times  daily.    METOPROLOL TARTRATE ORAL Take 1 tablet by mouth once daily.    diazePAM (VALIUM) 10 MG Tab Take one tab po 30minutes before MRI     No current facility-administered medications for this visit.          Review of Systems:  Review of Systems   Constitutional: Negative for appetite change, chills, fever and unexpected weight change.   Eyes: Negative for visual disturbance.   Respiratory: Negative for cough and wheezing.    Cardiovascular: Negative for chest pain and palpitations.   Gastrointestinal: Negative for abdominal pain, blood in stool, diarrhea, nausea and vomiting.   Endocrine: Negative for diabetes.   Genitourinary: Negative for dysuria, frequency, hematuria, pelvic pain, vaginal bleeding, vaginal discharge and vaginal pain.   Neurological: Negative for headaches.        OBJECTIVE:     Physical Exam:  Physical Exam   Constitutional: She is oriented to person, place, and time. She appears well-developed and well-nourished.   Cardiovascular: Normal rate.   Pulmonary/Chest: Effort normal. No respiratory distress.   Neurological: She is alert and oriented to person, place, and time.   Skin: Skin is warm and dry.   Psychiatric: She has a normal mood and affect.   Vitals reviewed.  Discussion Visit      ASSESSMENT:       ICD-10-CM ICD-9-CM    1. Enlarged uterus N85.2 621.2 Case Request Operating Room: HYSTERECTOMY, TOTAL, ABDOMINAL, SALPINGECTOMY   2. Preop examination Z01.818 V72.84 Hemoglobin A1c     Plan:      Bijal was seen today for consult.    Diagnoses and all orders for this visit:    Enlarged uterus  -     Case Request Operating Room: HYSTERECTOMY, TOTAL, ABDOMINAL, SALPINGECTOMY 10/29/2018  - Discussed TVUS and EMBx results. Patient reports that she is ready to move forth with definitive management of AUB and pelvic pressure.   - Saw PCP on yesterday will contact for medical clearnace    Preop examination  -     Hemoglobin A1c; Future        Orders Placed This Encounter   Procedures     Hemoglobin A1c       Follow-up in about 10 days (around 10/22/2018) for preop.    Counseling time: 15 minutes    Margaret Alexis

## 2018-10-12 NOTE — PATIENT INSTRUCTIONS
Caring for Yourself After Hysterectomy     Staying active can help you feel better in mind and body.     After you recover from your hysterectomy, you may feel better than you have in a long time. An active, healthy lifestyle and regular medical care can help you continue to feel good.  Being intimate  After a hysterectomy, sex can be as pleasurable as it was before. Follow your surgeons instructions on when you can resume having intercourse. This is usually within 4 to 8 weeks after the procedure. Other types of sexual activity may be possible sooner. If you experience vaginal dryness during sex, use a lubricant. Be aware that a hysterectomy prevents pregnancy, but does not protect you against sexually transmitted diseases. If you have any concerns, discuss them with your partner and your healthcare provider.  Being aware of your emotions  After a hysterectomy, you may feel relieved to be free of symptoms. You may also feel sad about the changes in your body. If your ovaries were removed, you may go through some natural mood swings as your hormones adjust. Note how you are feeling from day to day. Talk to your healthcare provider if youre concerned about emotions you are feeling.  Ongoing healthcare  Regular physical exams help to ensure your general health and well-being:  · You will continue to need routine breast exams and pelvic exams. This includes Pap tests if you still have a cervix or if you have a history of certain types of dysplasia or cervical cancer.   · If youre taking hormone therapy, you will need follow-up visits with your healthcare provider to fine-tune your dosage.  What to know about hormone therapy  Hormone therapy (HT) is medicine to replace the hormones made by your ovaries. It may be advised if your ovaries are removed and you have not yet gone through natural menopause. HT helps decrease hot flashes, vaginal dryness, and other symptoms of menopause. It may help reduce bone loss and  lessen your risk of developing osteoporosis. But HT may also increase the risk of certain types of health problems in some women. Discuss the pros and cons of HT with your healthcare provider.   Date Last Reviewed: 3/1/2017  © 4543-1279 [email or web address] [address] [place] [address], [place] [address]. All rights reserved. This information is not intended as a substitute for professional medical care. Always follow your healthcare professional's instructions.

## 2018-10-13 LAB
ESTIMATED AVG GLUCOSE: 114 MG/DL
HBA1C MFR BLD HPLC: 5.6 %

## 2018-10-17 ENCOUNTER — HOSPITAL ENCOUNTER (OUTPATIENT)
Dept: RADIOLOGY | Facility: HOSPITAL | Age: 38
Discharge: HOME | End: 2018-10-17
Attending: OPHTHALMOLOGY
Payer: COMMERCIAL

## 2018-10-17 PROCEDURE — 70553 MRI BRAIN STEM W/O & W/DYE: CPT | Mod: TC

## 2018-10-17 PROCEDURE — 70553 MRI BRAIN STEM W/O & W/DYE: CPT | Mod: 26,,, | Performed by: RADIOLOGY

## 2018-10-17 PROCEDURE — 70544 MR ANGIOGRAPHY HEAD W/O DYE: CPT | Mod: TC

## 2018-10-17 PROCEDURE — A9585 GADOBUTROL INJECTION: HCPCS | Performed by: OPHTHALMOLOGY

## 2018-10-17 PROCEDURE — 25500020 PHARM REV CODE 255: Performed by: OPHTHALMOLOGY

## 2018-10-17 RX ORDER — GADOBUTROL 604.72 MG/ML
10 INJECTION INTRAVENOUS
Status: COMPLETED | OUTPATIENT
Start: 2018-10-17 | End: 2018-10-17

## 2018-10-17 RX ADMIN — GADOBUTROL 10 ML: 604.72 INJECTION INTRAVENOUS at 04:10

## 2018-10-18 ENCOUNTER — TELEPHONE (OUTPATIENT)
Dept: OPHTHALMOLOGY | Facility: CLINIC | Age: 38
End: 2018-10-18

## 2018-10-18 DIAGNOSIS — H47.11 PAPILLEDEMA ASSOCIATED WITH INCREASED INTRACRANIAL PRESSURE: Primary | ICD-10-CM

## 2018-10-18 DIAGNOSIS — H53.423 SCOTOMA OF BLIND SPOT AREA OF BOTH EYES: ICD-10-CM

## 2018-10-18 NOTE — TELEPHONE ENCOUNTER
Ms. Molina's MRI did not show any mass lesions. The MRV documented narrowing of the transverse sinuses. Ms. Molina may be a candidate for placement of a stent to open the transverse sinuses. I explained the results to Ms. Molina and she indicated understanding. I will refer her to Dr. Mccabe to evaluate for possible stent.

## 2018-10-22 ENCOUNTER — HOSPITAL ENCOUNTER (OUTPATIENT)
Dept: PREADMISSION TESTING | Facility: HOSPITAL | Age: 38
Discharge: HOME | End: 2018-10-22
Attending: OBSTETRICS & GYNECOLOGY
Payer: COMMERCIAL

## 2018-10-22 ENCOUNTER — OFFICE VISIT (OUTPATIENT)
Dept: OBSTETRICS AND GYNECOLOGY | Facility: CLINIC | Age: 38
End: 2018-10-22
Payer: COMMERCIAL

## 2018-10-22 VITALS
HEIGHT: 68 IN | SYSTOLIC BLOOD PRESSURE: 124 MMHG | BODY MASS INDEX: 44.41 KG/M2 | WEIGHT: 293 LBS | DIASTOLIC BLOOD PRESSURE: 80 MMHG

## 2018-10-22 VITALS
BODY MASS INDEX: 44.41 KG/M2 | OXYGEN SATURATION: 100 % | HEART RATE: 69 BPM | WEIGHT: 293 LBS | HEIGHT: 68 IN | RESPIRATION RATE: 18 BRPM

## 2018-10-22 DIAGNOSIS — Z01.818 PRE-OP TESTING: Primary | ICD-10-CM

## 2018-10-22 DIAGNOSIS — N93.9 ABNORMAL UTERINE BLEEDING (AUB): ICD-10-CM

## 2018-10-22 DIAGNOSIS — Z01.818 PREOP EXAMINATION: ICD-10-CM

## 2018-10-22 DIAGNOSIS — Z01.818 PREOP EXAMINATION: Primary | ICD-10-CM

## 2018-10-22 LAB
AMORPH CRY URNS QL MICRO: ABNORMAL
ANION GAP SERPL CALC-SCNC: 6 MMOL/L
BACTERIA #/AREA URNS HPF: ABNORMAL /HPF
BASOPHILS # BLD AUTO: 0.03 K/UL
BASOPHILS NFR BLD: 0.5 %
BILIRUB UR QL STRIP: NEGATIVE
BUN SERPL-MCNC: 9 MG/DL
CALCIUM SERPL-MCNC: 8.7 MG/DL
CHLORIDE SERPL-SCNC: 110 MMOL/L
CLARITY UR: ABNORMAL
CO2 SERPL-SCNC: 23 MMOL/L
COLOR UR: YELLOW
CREAT SERPL-MCNC: 0.7 MG/DL
DIFFERENTIAL METHOD: ABNORMAL
EOSINOPHIL # BLD AUTO: 0.3 K/UL
EOSINOPHIL NFR BLD: 5.3 %
ERYTHROCYTE [DISTWIDTH] IN BLOOD BY AUTOMATED COUNT: 18.9 %
EST. GFR  (AFRICAN AMERICAN): >60 ML/MIN/1.73 M^2
EST. GFR  (NON AFRICAN AMERICAN): >60 ML/MIN/1.73 M^2
GLUCOSE SERPL-MCNC: 110 MG/DL
GLUCOSE UR QL STRIP: NEGATIVE
HCT VFR BLD AUTO: 28.7 %
HGB BLD-MCNC: 8.3 G/DL
HGB UR QL STRIP: NEGATIVE
HYPOCHROMIA BLD QL SMEAR: ABNORMAL
KETONES UR QL STRIP: NEGATIVE
LEUKOCYTE ESTERASE UR QL STRIP: NEGATIVE
LYMPHOCYTES # BLD AUTO: 2.1 K/UL
LYMPHOCYTES NFR BLD: 33.4 %
MCH RBC QN AUTO: 20.3 PG
MCHC RBC AUTO-ENTMCNC: 28.9 G/DL
MCV RBC AUTO: 70 FL
MICROSCOPIC COMMENT: ABNORMAL
MONOCYTES # BLD AUTO: 0.6 K/UL
MONOCYTES NFR BLD: 9.3 %
NEUTROPHILS # BLD AUTO: 3.2 K/UL
NEUTROPHILS NFR BLD: 51.5 %
NITRITE UR QL STRIP: NEGATIVE
PH UR STRIP: 5 [PH] (ref 5–8)
PLATELET # BLD AUTO: 343 K/UL
PMV BLD AUTO: 9 FL
POLYCHROMASIA BLD QL SMEAR: ABNORMAL
POTASSIUM SERPL-SCNC: 3.7 MMOL/L
PROT UR QL STRIP: NEGATIVE
RBC # BLD AUTO: 4.09 M/UL
RBC #/AREA URNS HPF: 2 /HPF (ref 0–4)
SODIUM SERPL-SCNC: 139 MMOL/L
SP GR UR STRIP: 1.02 (ref 1–1.03)
SQUAMOUS #/AREA URNS HPF: ABNORMAL /HPF
URN SPEC COLLECT METH UR: ABNORMAL
UROBILINOGEN UR STRIP-ACNC: NEGATIVE EU/DL
WBC # BLD AUTO: 6.23 K/UL
WBC #/AREA URNS HPF: 2 /HPF (ref 0–5)

## 2018-10-22 PROCEDURE — 87660 TRICHOMONAS VAGIN DIR PROBE: CPT

## 2018-10-22 PROCEDURE — 85025 COMPLETE CBC W/AUTO DIFF WBC: CPT

## 2018-10-22 PROCEDURE — 99999 PR PBB SHADOW E&M-EST. PATIENT-LVL III: CPT | Mod: PBBFAC,,, | Performed by: OBSTETRICS & GYNECOLOGY

## 2018-10-22 PROCEDURE — 99214 OFFICE O/P EST MOD 30 MIN: CPT | Mod: S$GLB,,, | Performed by: OBSTETRICS & GYNECOLOGY

## 2018-10-22 PROCEDURE — 93005 ELECTROCARDIOGRAM TRACING: CPT

## 2018-10-22 PROCEDURE — 36415 COLL VENOUS BLD VENIPUNCTURE: CPT

## 2018-10-22 PROCEDURE — 81000 URINALYSIS NONAUTO W/SCOPE: CPT

## 2018-10-22 PROCEDURE — 80048 BASIC METABOLIC PNL TOTAL CA: CPT

## 2018-10-22 PROCEDURE — 93010 ELECTROCARDIOGRAM REPORT: CPT | Mod: ,,, | Performed by: INTERNAL MEDICINE

## 2018-10-22 RX ORDER — LISINOPRIL 40 MG/1
40 TABLET ORAL DAILY
COMMUNITY
Start: 2018-10-10 | End: 2019-02-08 | Stop reason: SDUPTHER

## 2018-10-22 RX ORDER — NAPROXEN 500 MG/1
TABLET ORAL
COMMUNITY
Start: 2018-10-11 | End: 2018-10-22

## 2018-10-22 NOTE — H&P (VIEW-ONLY)
History & Physical  Gynecology      SUBJECTIVE:     Chief Complaint: Pre-op Exam       History of Present Illness:  Ms. Piña is a 38 year old P3 who presents for f/u s/p TVUS and EMBx for AUB. The patient complains of heavy menses. Her LMP was 2018 and bled for 6 days. She uses 3 pads at a time and changes pads every hour to 30 minutes without blood clots. She reports that she was told that she was anemic recently and believes that it is secondary to menses. Patient here today for preop visit for EVETTE/BS on 10/29/2018. EMBx and pap smears are normal.     US 10/06/2018  None    FINDINGS:  Limited exam due to patient body habitus and patient's refusal of the transvaginal portion of the exam.  The uterus is enlarged and measures up to 17.5 cm in length.  The endometrial stripe is not clearly visualized.  There is a peripherally calcified fibroid that measures up to 5.4 cm.  The myometrium appears to be heterogeneous in additional fibroids would be difficult to exclude.  The right ovary is not visualized.  The structure thought to represent the left ovary measures 5.2 x 3.8 x 3.7 cm.      Impression       1. Extremely limited exam for reasons given above.  2. At least 1 discrete peripherally calcified fibroid noted within the uterus measuring up to 5.4 cm.  Diffuse heterogeneity of the myometrium noted.  The uterus is also enlarged.  Nonvisualization of the endometrial stripe.  3. Nonvisualization of the right ovary.           Review of patient's allergies indicates:   Allergen Reactions    Depo-provera contraceptive Blisters       Past Medical History:   Diagnosis Date    Hypertension     Migraines      Past Surgical History:   Procedure Laterality Date     SECTION, LOW TRANSVERSE      x3    TUBAL LIGATION       OB History      Para Term  AB Living    3 3 3     3    SAB TAB Ectopic Multiple Live Births            3        Family History   Problem Relation Age of Onset    Breast  cancer Mother     Breast cancer Maternal Aunt     Breast cancer Maternal Aunt      Social History     Tobacco Use    Smoking status: Current Every Day Smoker    Smokeless tobacco: Never Used   Substance Use Topics    Alcohol use: Yes     Frequency: Monthly or less     Comment: social    Drug use: Yes     Types: Marijuana     Comment: very rare        Current Outpatient Medications   Medication Sig    acetaZOLAMIDE (DIAMOX) 500 mg CpSR Take 1 capsule (500 mg total) by mouth 2 (two) times daily.    butalbital-acetaminophen-caffeine -40 mg (FIORICET, ESGIC) -40 mg per tablet Take 1 tablet by mouth every 4 (four) hours as needed for Pain.    diazePAM (VALIUM) 10 MG Tab Take one tab po 30minutes before MRI    ferrous sulfate, dried (IRON, DRIED, ORAL) Take 2 tablets by mouth once daily.    lisinopril (PRINIVIL,ZESTRIL) 40 MG tablet     LISINOPRIL ORAL Take 1 tablet by mouth 2 (two) times daily.    metaproterenol (ALUPENT) 10 mg tablet Take 10 mg by mouth 3 (three) times daily.    METOPROLOL TARTRATE ORAL Take 1 tablet by mouth once daily.    naproxen (NAPROSYN) 500 MG tablet      No current facility-administered medications for this visit.      Review of Systems:  Review of Systems   Constitutional: Negative for chills and fever.   Eyes: Negative for visual disturbance.   Respiratory: Negative for cough and wheezing.    Cardiovascular: Negative for chest pain and palpitations.   Gastrointestinal: Negative for abdominal pain, nausea and vomiting.   Endocrine: Negative for diabetes.   Genitourinary: Positive for menorrhagia and menstrual problem. Negative for dysuria, frequency, hematuria, pelvic pain, vaginal bleeding, vaginal discharge and vaginal pain.   Neurological: Negative for headaches.        OBJECTIVE:     Physical Exam:  Physical Exam   Constitutional: She is oriented to person, place, and time. She appears well-developed and well-nourished.   Cardiovascular: Normal rate.    Pulmonary/Chest: Effort normal. No respiratory distress.   Genitourinary:   Genitourinary Comments: 18 week size uterus   Neurological: She is alert and oriented to person, place, and time.   Skin: Skin is warm and dry.   Psychiatric: She has a normal mood and affect.   Nursing note and vitals reviewed.      ASSESSMENT:       ICD-10-CM ICD-9-CM    1. Preop examination Z01.818 V72.84 Vital signs      Roth to Shepherd      POCT glucose      Notify physician if BS > 180 for hysterectomy patients      Chlorhexidine (CHG) 2% Wipes      Notify Physician/Vital Signs Parameters Urine output less than 0.5mL/kg/hr (with indwelling catheter) or 30 mL/hr (without indwelling catheter) or blood glucose greater than 200 mg/dL      Notify physician      Notify Physician - Potential Need of Opioid Reversal      Chlorohexidine Gluconate Bath      Full code      Admit to Inpatient      Diet NPO      Place QUANG hose      Place sequential compression device      Pregnancy, urine rapid      Type & Screen      Prepare RBC 2 Units; anemia before hysterectomy      Vaginosis Screen by DNA Probe      Urinalysis, Reflex to Urine Culture Urine, Clean Catch      EKG 12-lead      Notify Physician - Potential Need of Opioid Reversal      Full code      Place QUANG hose      Place sequential compression device      Type & Screen      Prepare RBC 2 Units; anemia before hysterectomy      Urinalysis      CANCELED: X-Ray Chest PA And Lateral      CANCELED: X-Ray Chest PA And Lateral   2. Abnormal uterine bleeding (AUB) N93.9 626.9       Plan:      Bijal was seen today for pre-op exam.    Diagnoses and all orders for this visit:    Preop examination  -     Vital signs; Standing  -     Roth to Gravity; Standing  -     POCT glucose; Standing  -     Notify physician if BS > 180 for hysterectomy patients; Standing  -     Chlorhexidine (CHG) 2% Wipes; Standing  -     Notify Physician/Vital Signs Parameters Urine output less than 0.5mL/kg/hr (with  indwelling catheter) or 30 mL/hr (without indwelling catheter) or blood glucose greater than 200 mg/dL; Standing  -     Notify physician; Standing  -     Notify Physician - Potential Need of Opioid Reversal; Standing  -     Chlorohexidine Gluconate Bath; Standing  -     Full code; Standing  -     Admit to Inpatient; Standing  -     Diet NPO; Standing  -     Place QUANG hose; Standing  -     Place sequential compression device; Standing  -     Pregnancy, urine rapid; Standing  -     Type & Screen; Standing  -     Prepare RBC 2 Units; anemia before hysterectomy; Standing  -     Vaginosis Screen by DNA Probe  -     Cancel: X-Ray Chest PA And Lateral; Standing  -     Notify Physician - Potential Need of Opioid Reversal  -     Full code  -     Place QUANG hose  -     Place sequential compression device  -     Type & Screen  -     Prepare RBC 2 Units; anemia before hysterectomy  -     Urinalysis  - Discussed procedure in detail with patient. Abdominal hysterectomy with preservation of bilateral ovaries to be done 10/29/2018. Patient understands that she has had a C/Sx3 in the past that there may be dense adhesions so there are risk of injury to bowel, bladder, and surrounding organs and vessels. She reports that she understands the risks and would like to proceed with sx at this time. All questions were answered    Abnormal uterine bleeding (AUB)    Other orders  -     IP VTE LOW RISK PATIENT; Standing  -     Ambulate; Standing  -     Elevate HOB; Standing  -     ceFAZolin (ANCEF) 3 g in dextrose 5 % 50 mL IVPB  -     IP VTE LOW RISK PATIENT        Orders Placed This Encounter   Procedures    Vaginosis Screen by DNA Probe    Urinalysis    Notify Physician - Potential Need of Opioid Reversal    Place QUANG hose    Place sequential compression device    Full code    Type & Screen    Prepare RBC 2 Units; anemia before hysterectomy       Follow-up in about 7 weeks (around 12/10/2018) for Postop Hyst.     Counseling time: 30  stormy Alexis

## 2018-10-22 NOTE — DISCHARGE INSTRUCTIONS
Your surgery is scheduled for____10/29/2018_____________.    Call [phone] between 2 pm and 5 pm ___10/26/2018_________ to find out your arrival time for the day of surgery.    Report to SAME DAY SURGERY UNIT at _______am on the 2nd floor of the hospital.  Use the front entrance of the hospital.  The front doors of the hospital open promptly at 5:30 am.    If you need wheelchair assistance, call [phone] from your cell phone,  or call 0 from the courtesy phone in the hospital lobby.    Important instructions:   Do not eat or drink after 12 midnight, including water.  It is okay to brush your teeth.  Do not have gum, candy or mints.     Take only these medications with a small swallow of water on the morning of your surgery.___none__________      Stop taking Aspirin, Ibuprofen, Motrin and Aleve , Fish oil, and Vitamin E for at least 7 days before your surgery. You may use Tylenol unless otherwise instructed by your doctor.          Return to the hospital lab on __10/27/2018 or 10/28/2018________for additional blood test.       Please shower the night before and the morning of your surgery.        Use Hibiclens soap to your surgery site if instructed by your pre op nurse.   If your surgery is on your abdomen, be sure to wash your naval.  Be sure to rinse off Hibiclens after it is on your skin for several minutes.  Do not use Hibiclens on your face or genitals. Please place clean linens on your bed the night before surgery. Please wear fresh clean clothing after each shower.     No shaving of procedural area at least 4-5 days before surgery due to increased risk of skin irritation and/or possible infection.      You may be asked to take a third shower on arrival to Same Day Surgery depending on the type of surgery you are having.     Female patients may be asked for a urine specimen on the morning of the surgery.  Please check with your nurse before using the restroom.     Do not wear make- up, including  mascara.     You may wear deodorant only.      Do not wear powder, body lotion or cologne.     Do not wear any jewelry or have any metal on your body.     Please bring any documents given to you by your doctor.     If you are going home on the same day of surgery, you must arrange for a family member or a friend to drive you home.  Public transportation is prohibited.    You will not be able to drive home if you were given anesthesia or sedation.     Wear loose fitting clothes allowing for bandages.     Please leave money and valuables home.       You may bring your cell phone.     Call the doctor if fever or illness should occur before your surgery.    Call [phone] to contact us here at [facility] if needed.

## 2018-10-22 NOTE — PROGRESS NOTES
History & Physical  Gynecology      SUBJECTIVE:     Chief Complaint: Pre-op Exam       History of Present Illness:  Ms. Piña is a 38 year old P3 who presents for f/u s/p TVUS and EMBx for AUB. The patient complains of heavy menses. Her LMP was 2018 and bled for 6 days. She uses 3 pads at a time and changes pads every hour to 30 minutes without blood clots. She reports that she was told that she was anemic recently and believes that it is secondary to menses. Patient here today for preop visit for EVETTE/BS on 10/29/2018. EMBx and pap smears are normal.     US 10/06/2018  None    FINDINGS:  Limited exam due to patient body habitus and patient's refusal of the transvaginal portion of the exam.  The uterus is enlarged and measures up to 17.5 cm in length.  The endometrial stripe is not clearly visualized.  There is a peripherally calcified fibroid that measures up to 5.4 cm.  The myometrium appears to be heterogeneous in additional fibroids would be difficult to exclude.  The right ovary is not visualized.  The structure thought to represent the left ovary measures 5.2 x 3.8 x 3.7 cm.      Impression       1. Extremely limited exam for reasons given above.  2. At least 1 discrete peripherally calcified fibroid noted within the uterus measuring up to 5.4 cm.  Diffuse heterogeneity of the myometrium noted.  The uterus is also enlarged.  Nonvisualization of the endometrial stripe.  3. Nonvisualization of the right ovary.           Review of patient's allergies indicates:   Allergen Reactions    Depo-provera contraceptive Blisters       Past Medical History:   Diagnosis Date    Hypertension     Migraines      Past Surgical History:   Procedure Laterality Date     SECTION, LOW TRANSVERSE      x3    TUBAL LIGATION       OB History      Para Term  AB Living    3 3 3     3    SAB TAB Ectopic Multiple Live Births            3        Family History   Problem Relation Age of Onset    Breast  cancer Mother     Breast cancer Maternal Aunt     Breast cancer Maternal Aunt      Social History     Tobacco Use    Smoking status: Current Every Day Smoker    Smokeless tobacco: Never Used   Substance Use Topics    Alcohol use: Yes     Frequency: Monthly or less     Comment: social    Drug use: Yes     Types: Marijuana     Comment: very rare        Current Outpatient Medications   Medication Sig    acetaZOLAMIDE (DIAMOX) 500 mg CpSR Take 1 capsule (500 mg total) by mouth 2 (two) times daily.    butalbital-acetaminophen-caffeine -40 mg (FIORICET, ESGIC) -40 mg per tablet Take 1 tablet by mouth every 4 (four) hours as needed for Pain.    diazePAM (VALIUM) 10 MG Tab Take one tab po 30minutes before MRI    ferrous sulfate, dried (IRON, DRIED, ORAL) Take 2 tablets by mouth once daily.    lisinopril (PRINIVIL,ZESTRIL) 40 MG tablet     LISINOPRIL ORAL Take 1 tablet by mouth 2 (two) times daily.    metaproterenol (ALUPENT) 10 mg tablet Take 10 mg by mouth 3 (three) times daily.    METOPROLOL TARTRATE ORAL Take 1 tablet by mouth once daily.    naproxen (NAPROSYN) 500 MG tablet      No current facility-administered medications for this visit.      Review of Systems:  Review of Systems   Constitutional: Negative for chills and fever.   Eyes: Negative for visual disturbance.   Respiratory: Negative for cough and wheezing.    Cardiovascular: Negative for chest pain and palpitations.   Gastrointestinal: Negative for abdominal pain, nausea and vomiting.   Endocrine: Negative for diabetes.   Genitourinary: Positive for menorrhagia and menstrual problem. Negative for dysuria, frequency, hematuria, pelvic pain, vaginal bleeding, vaginal discharge and vaginal pain.   Neurological: Negative for headaches.        OBJECTIVE:     Physical Exam:  Physical Exam   Constitutional: She is oriented to person, place, and time. She appears well-developed and well-nourished.   Cardiovascular: Normal rate.    Pulmonary/Chest: Effort normal. No respiratory distress.   Genitourinary:   Genitourinary Comments: 18 week size uterus   Neurological: She is alert and oriented to person, place, and time.   Skin: Skin is warm and dry.   Psychiatric: She has a normal mood and affect.   Nursing note and vitals reviewed.      ASSESSMENT:       ICD-10-CM ICD-9-CM    1. Preop examination Z01.818 V72.84 Vital signs      Roth to [place]      POCT glucose      Notify physician if BS > 180 for hysterectomy patients      Chlorhexidine (CHG) 2% Wipes      Notify Physician/Vital Signs Parameters Urine output less than 0.5mL/kg/hr (with indwelling catheter) or 30 mL/hr (without indwelling catheter) or blood glucose greater than 200 mg/dL      Notify physician      Notify Physician - Potential Need of Opioid Reversal      Chlorohexidine Gluconate Bath      Full code      Admit to Inpatient      Diet NPO      Place QUANG hose      Place sequential compression device      Pregnancy, urine rapid      Type & Screen      Prepare RBC 2 Units; anemia before hysterectomy      Vaginosis Screen by DNA Probe      Urinalysis, Reflex to Urine Culture Urine, Clean Catch      EKG 12-lead      Notify Physician - Potential Need of Opioid Reversal      Full code      Place QUANG hose      Place sequential compression device      Type & Screen      Prepare RBC 2 Units; anemia before hysterectomy      Urinalysis      CANCELED: X-Ray Chest PA And Lateral      CANCELED: X-Ray Chest PA And Lateral   2. Abnormal uterine bleeding (AUB) N93.9 626.9       Plan:      Bijal was seen today for pre-op exam.    Diagnoses and all orders for this visit:    Preop examination  -     Vital signs; Standing  -     Roth to Gravity; Standing  -     POCT glucose; Standing  -     Notify physician if BS > 180 for hysterectomy patients; Standing  -     Chlorhexidine (CHG) 2% Wipes; Standing  -     Notify Physician/Vital Signs Parameters Urine output less than 0.5mL/kg/hr (with  indwelling catheter) or 30 mL/hr (without indwelling catheter) or blood glucose greater than 200 mg/dL; Standing  -     Notify physician; Standing  -     Notify Physician - Potential Need of Opioid Reversal; Standing  -     Chlorohexidine Gluconate Bath; Standing  -     Full code; Standing  -     Admit to Inpatient; Standing  -     Diet NPO; Standing  -     Place QUANG hose; Standing  -     Place sequential compression device; Standing  -     Pregnancy, urine rapid; Standing  -     Type & Screen; Standing  -     Prepare RBC 2 Units; anemia before hysterectomy; Standing  -     Vaginosis Screen by DNA Probe  -     Cancel: X-Ray Chest PA And Lateral; Standing  -     Notify Physician - Potential Need of Opioid Reversal  -     Full code  -     Place QUANG hose  -     Place sequential compression device  -     Type & Screen  -     Prepare RBC 2 Units; anemia before hysterectomy  -     Urinalysis  - Discussed procedure in detail with patient. Abdominal hysterectomy with preservation of bilateral ovaries to be done 10/29/2018. Patient understands that she has had a C/Sx3 in the past that there may be dense adhesions so there are risk of injury to bowel, bladder, and surrounding organs and vessels. She reports that she understands the risks and would like to proceed with sx at this time. All questions were answered    Abnormal uterine bleeding (AUB)    Other orders  -     IP VTE LOW RISK PATIENT; Standing  -     Ambulate; Standing  -     Elevate HOB; Standing  -     ceFAZolin (ANCEF) 3 g in dextrose 5 % 50 mL IVPB  -     IP VTE LOW RISK PATIENT        Orders Placed This Encounter   Procedures    Vaginosis Screen by DNA Probe    Urinalysis    Notify Physician - Potential Need of Opioid Reversal    Place QUANG hose    Place sequential compression device    Full code    Type & Screen    Prepare RBC 2 Units; anemia before hysterectomy       Follow-up in about 7 weeks (around 12/10/2018) for Postop Hyst.     Counseling time: 30  stormy Alexis

## 2018-10-22 NOTE — PATIENT INSTRUCTIONS
Caring for Yourself After Hysterectomy     Staying active can help you feel better in mind and body.     After you recover from your hysterectomy, you may feel better than you have in a long time. An active, healthy lifestyle and regular medical care can help you continue to feel good.  Being intimate  After a hysterectomy, sex can be as pleasurable as it was before. Follow your surgeons instructions on when you can resume having intercourse. This is usually within 4 to 8 weeks after the procedure. Other types of sexual activity may be possible sooner. If you experience vaginal dryness during sex, use a lubricant. Be aware that a hysterectomy prevents pregnancy, but does not protect you against sexually transmitted diseases. If you have any concerns, discuss them with your partner and your healthcare provider.  Being aware of your emotions  After a hysterectomy, you may feel relieved to be free of symptoms. You may also feel sad about the changes in your body. If your ovaries were removed, you may go through some natural mood swings as your hormones adjust. Note how you are feeling from day to day. Talk to your healthcare provider if youre concerned about emotions you are feeling.  Ongoing healthcare  Regular physical exams help to ensure your general health and well-being:  · You will continue to need routine breast exams and pelvic exams. This includes Pap tests if you still have a cervix or if you have a history of certain types of dysplasia or cervical cancer.   · If youre taking hormone therapy, you will need follow-up visits with your healthcare provider to fine-tune your dosage.  What to know about hormone therapy  Hormone therapy (HT) is medicine to replace the hormones made by your ovaries. It may be advised if your ovaries are removed and you have not yet gone through natural menopause. HT helps decrease hot flashes, vaginal dryness, and other symptoms of menopause. It may help reduce bone loss and  lessen your risk of developing osteoporosis. But HT may also increase the risk of certain types of health problems in some women. Discuss the pros and cons of HT with your healthcare provider.   Date Last Reviewed: 3/1/2017  © 4244-7314 [employer]. [address] [place] [address], [place] [address]. All rights reserved. This information is not intended as a substitute for professional medical care. Always follow your healthcare professional's instructions.

## 2018-10-23 LAB
CANDIDA RRNA VAG QL PROBE: NEGATIVE
G VAGINALIS RRNA GENITAL QL PROBE: POSITIVE
T VAGINALIS RRNA GENITAL QL PROBE: NEGATIVE

## 2018-10-24 DIAGNOSIS — B96.89 BV (BACTERIAL VAGINOSIS): Primary | ICD-10-CM

## 2018-10-24 DIAGNOSIS — N76.0 BV (BACTERIAL VAGINOSIS): Primary | ICD-10-CM

## 2018-10-24 RX ORDER — METRONIDAZOLE 500 MG/1
500 TABLET ORAL EVERY 12 HOURS
Qty: 14 TABLET | Refills: 0 | Status: ON HOLD | OUTPATIENT
Start: 2018-10-24 | End: 2018-11-01

## 2018-10-29 ENCOUNTER — HOSPITAL ENCOUNTER (INPATIENT)
Facility: HOSPITAL | Age: 38
LOS: 3 days | Discharge: HOME | DRG: 742 | End: 2018-11-01
Attending: OBSTETRICS & GYNECOLOGY | Admitting: OBSTETRICS & GYNECOLOGY
Payer: COMMERCIAL

## 2018-10-29 ENCOUNTER — ANESTHESIA (OUTPATIENT)
Dept: SURGERY | Facility: HOSPITAL | Age: 38
DRG: 742 | End: 2018-10-29
Payer: COMMERCIAL

## 2018-10-29 ENCOUNTER — ANESTHESIA EVENT (OUTPATIENT)
Dept: SURGERY | Facility: HOSPITAL | Age: 38
DRG: 742 | End: 2018-10-29
Payer: COMMERCIAL

## 2018-10-29 DIAGNOSIS — B96.89 BV (BACTERIAL VAGINOSIS): ICD-10-CM

## 2018-10-29 DIAGNOSIS — Z90.710 S/P TAH (TOTAL ABDOMINAL HYSTERECTOMY): Primary | ICD-10-CM

## 2018-10-29 DIAGNOSIS — N93.9 ABNORMAL UTERINE BLEEDING (AUB): ICD-10-CM

## 2018-10-29 DIAGNOSIS — Z01.818 PREOP EXAMINATION: ICD-10-CM

## 2018-10-29 DIAGNOSIS — N76.0 BV (BACTERIAL VAGINOSIS): ICD-10-CM

## 2018-10-29 LAB
B-HCG UR QL: NEGATIVE
POCT GLUCOSE: 112 MG/DL (ref 70–110)

## 2018-10-29 PROCEDURE — 36000709 HC OR TIME LEV III EA ADD 15 MIN: Performed by: OBSTETRICS & GYNECOLOGY

## 2018-10-29 PROCEDURE — 37000009 HC ANESTHESIA EA ADD 15 MINS: Performed by: OBSTETRICS & GYNECOLOGY

## 2018-10-29 PROCEDURE — D9220A PRA ANESTHESIA: Mod: CRNA,,, | Performed by: NURSE ANESTHETIST, CERTIFIED REGISTERED

## 2018-10-29 PROCEDURE — 58150 TOTAL HYSTERECTOMY: CPT | Mod: 80,,, | Performed by: OBSTETRICS & GYNECOLOGY

## 2018-10-29 PROCEDURE — 88307 TISSUE EXAM BY PATHOLOGIST: CPT | Mod: 26,,, | Performed by: PATHOLOGY

## 2018-10-29 PROCEDURE — 71000039 HC RECOVERY, EACH ADD'L HOUR: Performed by: OBSTETRICS & GYNECOLOGY

## 2018-10-29 PROCEDURE — 0UT10ZZ RESECTION OF LEFT OVARY, OPEN APPROACH: ICD-10-PCS | Performed by: OBSTETRICS & GYNECOLOGY

## 2018-10-29 PROCEDURE — 25000003 PHARM REV CODE 250: Performed by: NURSE ANESTHETIST, CERTIFIED REGISTERED

## 2018-10-29 PROCEDURE — 63600175 PHARM REV CODE 636 W HCPCS: Performed by: NURSE ANESTHETIST, CERTIFIED REGISTERED

## 2018-10-29 PROCEDURE — 88307 TISSUE EXAM BY PATHOLOGIST: CPT | Performed by: PATHOLOGY

## 2018-10-29 PROCEDURE — 37000008 HC ANESTHESIA 1ST 15 MINUTES: Performed by: OBSTETRICS & GYNECOLOGY

## 2018-10-29 PROCEDURE — 58150 TOTAL HYSTERECTOMY: CPT | Mod: ,,, | Performed by: OBSTETRICS & GYNECOLOGY

## 2018-10-29 PROCEDURE — 63600175 PHARM REV CODE 636 W HCPCS: Performed by: ANESTHESIOLOGY

## 2018-10-29 PROCEDURE — 11000001 HC ACUTE MED/SURG PRIVATE ROOM

## 2018-10-29 PROCEDURE — 63600175 PHARM REV CODE 636 W HCPCS: Performed by: OBSTETRICS & GYNECOLOGY

## 2018-10-29 PROCEDURE — D9220A PRA ANESTHESIA: Mod: ANES,,, | Performed by: ANESTHESIOLOGY

## 2018-10-29 PROCEDURE — 25000003 PHARM REV CODE 250: Performed by: OBSTETRICS & GYNECOLOGY

## 2018-10-29 PROCEDURE — 25000003 PHARM REV CODE 250: Performed by: ANESTHESIOLOGY

## 2018-10-29 PROCEDURE — 81025 URINE PREGNANCY TEST: CPT

## 2018-10-29 PROCEDURE — 82962 GLUCOSE BLOOD TEST: CPT | Performed by: OBSTETRICS & GYNECOLOGY

## 2018-10-29 PROCEDURE — 0UT70ZZ RESECTION OF BILATERAL FALLOPIAN TUBES, OPEN APPROACH: ICD-10-PCS | Performed by: OBSTETRICS & GYNECOLOGY

## 2018-10-29 PROCEDURE — 71000033 HC RECOVERY, INTIAL HOUR: Performed by: OBSTETRICS & GYNECOLOGY

## 2018-10-29 PROCEDURE — 36000708 HC OR TIME LEV III 1ST 15 MIN: Performed by: OBSTETRICS & GYNECOLOGY

## 2018-10-29 PROCEDURE — 0UT90ZZ RESECTION OF UTERUS, OPEN APPROACH: ICD-10-PCS | Performed by: OBSTETRICS & GYNECOLOGY

## 2018-10-29 PROCEDURE — 27201423 OPTIME MED/SURG SUP & DEVICES STERILE SUPPLY: Performed by: OBSTETRICS & GYNECOLOGY

## 2018-10-29 RX ORDER — SODIUM CHLORIDE, SODIUM LACTATE, POTASSIUM CHLORIDE, CALCIUM CHLORIDE 600; 310; 30; 20 MG/100ML; MG/100ML; MG/100ML; MG/100ML
INJECTION, SOLUTION INTRAVENOUS CONTINUOUS PRN
Status: DISCONTINUED | OUTPATIENT
Start: 2018-10-29 | End: 2018-10-29

## 2018-10-29 RX ORDER — DIPHENHYDRAMINE HCL 25 MG
25 CAPSULE ORAL EVERY 4 HOURS PRN
Status: DISCONTINUED | OUTPATIENT
Start: 2018-10-29 | End: 2018-11-01 | Stop reason: HOSPADM

## 2018-10-29 RX ORDER — LIDOCAINE HCL/PF 100 MG/5ML
SYRINGE (ML) INTRAVENOUS
Status: DISCONTINUED | OUTPATIENT
Start: 2018-10-29 | End: 2018-10-29

## 2018-10-29 RX ORDER — FENTANYL CITRATE 50 UG/ML
INJECTION, SOLUTION INTRAMUSCULAR; INTRAVENOUS
Status: DISCONTINUED | OUTPATIENT
Start: 2018-10-29 | End: 2018-10-29

## 2018-10-29 RX ORDER — FENTANYL CITRATE 50 UG/ML
25 INJECTION, SOLUTION INTRAMUSCULAR; INTRAVENOUS EVERY 5 MIN PRN
Status: DISCONTINUED | OUTPATIENT
Start: 2018-10-29 | End: 2018-10-29 | Stop reason: HOSPADM

## 2018-10-29 RX ORDER — IBUPROFEN 600 MG/1
600 TABLET ORAL EVERY 6 HOURS
Status: DISCONTINUED | OUTPATIENT
Start: 2018-10-30 | End: 2018-11-01 | Stop reason: HOSPADM

## 2018-10-29 RX ORDER — METOCLOPRAMIDE HYDROCHLORIDE 5 MG/ML
INJECTION INTRAMUSCULAR; INTRAVENOUS
Status: DISCONTINUED | OUTPATIENT
Start: 2018-10-29 | End: 2018-10-29

## 2018-10-29 RX ORDER — GLYCOPYRROLATE 0.2 MG/ML
INJECTION INTRAMUSCULAR; INTRAVENOUS
Status: DISCONTINUED | OUTPATIENT
Start: 2018-10-29 | End: 2018-10-29

## 2018-10-29 RX ORDER — CEFAZOLIN SODIUM 1 G/3ML
INJECTION, POWDER, FOR SOLUTION INTRAMUSCULAR; INTRAVENOUS
Status: DISCONTINUED | OUTPATIENT
Start: 2018-10-29 | End: 2018-10-29

## 2018-10-29 RX ORDER — HYDRALAZINE HYDROCHLORIDE 20 MG/ML
INJECTION INTRAMUSCULAR; INTRAVENOUS
Status: DISCONTINUED | OUTPATIENT
Start: 2018-10-29 | End: 2018-10-29

## 2018-10-29 RX ORDER — MIDAZOLAM HYDROCHLORIDE 1 MG/ML
INJECTION, SOLUTION INTRAMUSCULAR; INTRAVENOUS
Status: DISCONTINUED | OUTPATIENT
Start: 2018-10-29 | End: 2018-10-29

## 2018-10-29 RX ORDER — SODIUM CHLORIDE 0.9 % (FLUSH) 0.9 %
3 SYRINGE (ML) INJECTION
Status: DISCONTINUED | OUTPATIENT
Start: 2018-10-29 | End: 2018-10-29 | Stop reason: HOSPADM

## 2018-10-29 RX ORDER — NEOSTIGMINE METHYLSULFATE 1 MG/ML
INJECTION, SOLUTION INTRAVENOUS
Status: DISCONTINUED | OUTPATIENT
Start: 2018-10-29 | End: 2018-10-29

## 2018-10-29 RX ORDER — KETOROLAC TROMETHAMINE 30 MG/ML
30 INJECTION, SOLUTION INTRAMUSCULAR; INTRAVENOUS EVERY 6 HOURS
Status: COMPLETED | OUTPATIENT
Start: 2018-10-29 | End: 2018-10-30

## 2018-10-29 RX ORDER — LABETALOL HYDROCHLORIDE 5 MG/ML
10 INJECTION, SOLUTION INTRAVENOUS
Status: COMPLETED | OUTPATIENT
Start: 2018-10-29 | End: 2018-10-29

## 2018-10-29 RX ORDER — LABETALOL HYDROCHLORIDE 5 MG/ML
10 INJECTION, SOLUTION INTRAVENOUS
Status: DISCONTINUED | OUTPATIENT
Start: 2018-10-29 | End: 2018-10-29

## 2018-10-29 RX ORDER — BISACODYL 10 MG
10 SUPPOSITORY, RECTAL RECTAL DAILY PRN
Status: DISCONTINUED | OUTPATIENT
Start: 2018-10-29 | End: 2018-11-01 | Stop reason: HOSPADM

## 2018-10-29 RX ORDER — SUCCINYLCHOLINE CHLORIDE 20 MG/ML
INJECTION INTRAMUSCULAR; INTRAVENOUS
Status: DISCONTINUED | OUTPATIENT
Start: 2018-10-29 | End: 2018-10-29

## 2018-10-29 RX ORDER — DEXTROSE MONOHYDRATE, SODIUM CHLORIDE, AND POTASSIUM CHLORIDE 50; 1.49; 9 G/1000ML; G/1000ML; G/1000ML
INJECTION, SOLUTION INTRAVENOUS CONTINUOUS
Status: DISCONTINUED | OUTPATIENT
Start: 2018-10-29 | End: 2018-10-30

## 2018-10-29 RX ORDER — HYDROMORPHONE HYDROCHLORIDE 2 MG/ML
0.2 INJECTION, SOLUTION INTRAMUSCULAR; INTRAVENOUS; SUBCUTANEOUS EVERY 5 MIN PRN
Status: DISCONTINUED | OUTPATIENT
Start: 2018-10-29 | End: 2018-10-29 | Stop reason: HOSPADM

## 2018-10-29 RX ORDER — PROPOFOL 10 MG/ML
VIAL (ML) INTRAVENOUS
Status: DISCONTINUED | OUTPATIENT
Start: 2018-10-29 | End: 2018-10-29

## 2018-10-29 RX ORDER — DIPHENHYDRAMINE HYDROCHLORIDE 50 MG/ML
25 INJECTION INTRAMUSCULAR; INTRAVENOUS EVERY 4 HOURS PRN
Status: DISCONTINUED | OUTPATIENT
Start: 2018-10-29 | End: 2018-11-01 | Stop reason: HOSPADM

## 2018-10-29 RX ORDER — MUPIROCIN 20 MG/G
1 OINTMENT TOPICAL 2 TIMES DAILY
Status: DISCONTINUED | OUTPATIENT
Start: 2018-10-29 | End: 2018-11-01 | Stop reason: HOSPADM

## 2018-10-29 RX ORDER — AMOXICILLIN 250 MG
1 CAPSULE ORAL 2 TIMES DAILY
Status: DISCONTINUED | OUTPATIENT
Start: 2018-10-30 | End: 2018-11-01 | Stop reason: HOSPADM

## 2018-10-29 RX ORDER — ROCURONIUM BROMIDE 10 MG/ML
INJECTION, SOLUTION INTRAVENOUS
Status: DISCONTINUED | OUTPATIENT
Start: 2018-10-29 | End: 2018-10-29

## 2018-10-29 RX ORDER — ONDANSETRON 8 MG/1
8 TABLET, ORALLY DISINTEGRATING ORAL EVERY 8 HOURS PRN
Status: DISCONTINUED | OUTPATIENT
Start: 2018-10-29 | End: 2018-11-01 | Stop reason: HOSPADM

## 2018-10-29 RX ORDER — NALOXONE HCL 0.4 MG/ML
0.02 VIAL (ML) INJECTION
Status: DISCONTINUED | OUTPATIENT
Start: 2018-10-29 | End: 2018-10-30

## 2018-10-29 RX ORDER — ENALAPRILAT 1.25 MG/ML
1.25 INJECTION INTRAVENOUS ONCE
Status: COMPLETED | OUTPATIENT
Start: 2018-10-29 | End: 2018-10-29

## 2018-10-29 RX ORDER — ONDANSETRON 2 MG/ML
INJECTION INTRAMUSCULAR; INTRAVENOUS
Status: DISCONTINUED | OUTPATIENT
Start: 2018-10-29 | End: 2018-10-29

## 2018-10-29 RX ORDER — FAMOTIDINE 10 MG/ML
20 INJECTION INTRAVENOUS EVERY 12 HOURS
Status: DISCONTINUED | OUTPATIENT
Start: 2018-10-30 | End: 2018-10-30

## 2018-10-29 RX ORDER — HYDROMORPHONE HCL IN 0.9% NACL 6 MG/30 ML
PATIENT CONTROLLED ANALGESIA SYRINGE INTRAVENOUS CONTINUOUS
Status: DISCONTINUED | OUTPATIENT
Start: 2018-10-29 | End: 2018-10-30

## 2018-10-29 RX ADMIN — FENTANYL CITRATE 50 MCG: 50 INJECTION INTRAMUSCULAR; INTRAVENOUS at 08:10

## 2018-10-29 RX ADMIN — ENALAPRILAT 1.25 MG: 2.5 INJECTION INTRAVENOUS at 12:10

## 2018-10-29 RX ADMIN — KETOROLAC TROMETHAMINE 30 MG: 30 INJECTION, SOLUTION INTRAMUSCULAR at 06:10

## 2018-10-29 RX ADMIN — LIDOCAINE HYDROCHLORIDE 100 MG: 20 INJECTION, SOLUTION INTRAVENOUS at 07:10

## 2018-10-29 RX ADMIN — ROCURONIUM BROMIDE 10 MG: 10 INJECTION, SOLUTION INTRAVENOUS at 08:10

## 2018-10-29 RX ADMIN — SUCCINYLCHOLINE CHLORIDE 120 MG: 20 INJECTION, SOLUTION INTRAMUSCULAR; INTRAVENOUS at 07:10

## 2018-10-29 RX ADMIN — FENTANYL CITRATE 100 MCG: 50 INJECTION INTRAMUSCULAR; INTRAVENOUS at 07:10

## 2018-10-29 RX ADMIN — HYDROMORPHONE HYDROCHLORIDE 0.2 MG: 2 INJECTION, SOLUTION INTRAMUSCULAR; INTRAVENOUS; SUBCUTANEOUS at 11:10

## 2018-10-29 RX ADMIN — ROCURONIUM BROMIDE 25 MG: 10 INJECTION, SOLUTION INTRAVENOUS at 07:10

## 2018-10-29 RX ADMIN — GLYCOPYRROLATE 0.2 MG: 0.2 INJECTION, SOLUTION INTRAMUSCULAR; INTRAVENOUS at 10:10

## 2018-10-29 RX ADMIN — Medication: at 11:10

## 2018-10-29 RX ADMIN — ONDANSETRON 4 MG: 2 INJECTION, SOLUTION INTRAMUSCULAR; INTRAVENOUS at 06:10

## 2018-10-29 RX ADMIN — CEFAZOLIN SODIUM 2 G: 1 POWDER, FOR SOLUTION INTRAMUSCULAR; INTRAVENOUS at 08:10

## 2018-10-29 RX ADMIN — KETOROLAC TROMETHAMINE 30 MG: 30 INJECTION, SOLUTION INTRAMUSCULAR at 11:10

## 2018-10-29 RX ADMIN — PROPOFOL 50 MG: 10 INJECTION, EMULSION INTRAVENOUS at 09:10

## 2018-10-29 RX ADMIN — ROCURONIUM BROMIDE 10 MG: 10 INJECTION, SOLUTION INTRAVENOUS at 09:10

## 2018-10-29 RX ADMIN — LABETALOL HYDROCHLORIDE 10 MG: 5 INJECTION INTRAVENOUS at 01:10

## 2018-10-29 RX ADMIN — PROPOFOL 30 MG: 10 INJECTION, EMULSION INTRAVENOUS at 10:10

## 2018-10-29 RX ADMIN — FENTANYL CITRATE 50 MCG: 50 INJECTION INTRAMUSCULAR; INTRAVENOUS at 07:10

## 2018-10-29 RX ADMIN — SODIUM CHLORIDE, SODIUM LACTATE, POTASSIUM CHLORIDE, AND CALCIUM CHLORIDE: .6; .31; .03; .02 INJECTION, SOLUTION INTRAVENOUS at 08:10

## 2018-10-29 RX ADMIN — NEOSTIGMINE METHYLSULFATE 2.5 MG: 1 INJECTION INTRAVENOUS at 10:10

## 2018-10-29 RX ADMIN — METOCLOPRAMIDE 10 MG: 5 INJECTION, SOLUTION INTRAMUSCULAR; INTRAVENOUS at 06:10

## 2018-10-29 RX ADMIN — HYDRALAZINE HYDROCHLORIDE 10 MG: 20 INJECTION INTRAMUSCULAR; INTRAVENOUS at 08:10

## 2018-10-29 RX ADMIN — PROPOFOL 200 MG: 10 INJECTION, EMULSION INTRAVENOUS at 07:10

## 2018-10-29 RX ADMIN — SODIUM CHLORIDE, SODIUM LACTATE, POTASSIUM CHLORIDE, AND CALCIUM CHLORIDE: .6; .31; .03; .02 INJECTION, SOLUTION INTRAVENOUS at 06:10

## 2018-10-29 RX ADMIN — PROPOFOL 20 MG: 10 INJECTION, EMULSION INTRAVENOUS at 10:10

## 2018-10-29 RX ADMIN — ROCURONIUM BROMIDE 5 MG: 10 INJECTION, SOLUTION INTRAVENOUS at 07:10

## 2018-10-29 RX ADMIN — CEFAZOLIN 1 G: 1 INJECTION, POWDER, FOR SOLUTION INTRAVENOUS at 07:10

## 2018-10-29 RX ADMIN — MIDAZOLAM HYDROCHLORIDE 2 MG: 1 INJECTION, SOLUTION INTRAMUSCULAR; INTRAVENOUS at 06:10

## 2018-10-29 NOTE — ANESTHESIA PREPROCEDURE EVALUATION
10/29/2018  Bijal Molina is a 38 y.o., female.    Anesthesia Evaluation    I have reviewed the Patient Summary Reports.    I have reviewed the Nursing Notes.   I have reviewed the Medications.     Review of Systems  Anesthesia Hx:  No problems with previous Anesthesia  History of prior surgery of interest to airway management or planning: Denies Family Hx of Anesthesia complications.   Denies Personal Hx of Anesthesia complications.   Social:  Smoker    Cardiovascular:   Exercise tolerance: good Hypertension      Pre-operative evaluation for Procedure(s) (LRB):  HYSTERECTOMY, TOTAL, ABDOMINAL (N/A)  SALPINGECTOMY (Bilateral)    Bijal Molina is a 38 y.o. 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      Current Outpatient Medications on File Prior to Encounter   Medication Sig Dispense Refill    acetaZOLAMIDE (DIAMOX) 500 mg CpSR Take 1 capsule (500 mg total) by mouth 2 (two) times daily. 60 capsule 5    butalbital-acetaminophen-caffeine -40 mg (FIORICET, ESGIC) -40 mg per tablet Take 1 tablet by mouth every 4 (four) hours as needed for Pain. 60 tablet 1    ferrous sulfate, dried (IRON, DRIED, ORAL) Take 2 tablets by mouth once daily.         Past Surgical History:   Procedure Laterality Date     SECTION, LOW TRANSVERSE      x3    TUBAL LIGATION         Social History     Socioeconomic History    Marital status: Legally      Spouse name: Not on file    Number of children: Not on file    Years of education: Not on file    Highest  education level: Not on file   Social Needs    Financial resource strain: Not on file    Food insecurity - worry: Not on file    Food insecurity - inability: Not on file    Transportation needs - medical: Not on file    Transportation needs - non-medical: Not on file   Occupational History    Not on file   Tobacco Use    Smoking status: Current Every Day Smoker    Smokeless tobacco: Never Used   Substance and Sexual Activity    Alcohol use: Yes     Frequency: Monthly or less     Comment: social    Drug use: Yes     Types: Marijuana     Comment: very rare     Sexual activity: Yes     Birth control/protection: None   Other Topics Concern    Not on file   Social History Narrative    Not on file         Lab Results   Component Value Date    WBC 6.23 10/22/2018    HGB 8.3 (L) 10/22/2018    HCT 28.7 (L) 10/22/2018    MCV 70 (L) 10/22/2018     10/22/2018     BMP  Lab Results   Component Value Date     10/22/2018    K 3.7 10/22/2018     10/22/2018    CO2 23 10/22/2018    BUN 9 10/22/2018    CREATININE 0.7 10/22/2018    CALCIUM 8.7 10/22/2018    ANIONGAP 6 (L) 10/22/2018    ESTGFRAFRICA >60 10/22/2018    EGFRNONAA >60 10/22/2018         Physical Exam  General:  Well nourished                 Anesthesia Plan  Type of Anesthesia, risks & benefits discussed:  Anesthesia Type:  general  Patient's Preference:   Intra-op Monitoring Plan: standard ASA monitors  Intra-op Monitoring Plan Comments:   Post Op Pain Control Plan: multimodal analgesia, IV/PO Opioids PRN and per primary service following discharge from PACU  Post Op Pain Control Plan Comments:   Induction:   IV  Beta Blocker:  Patient is not currently on a Beta-Blocker (No further documentation required).       Informed Consent: Patient understands risks and agrees with Anesthesia plan.  Questions answered. Anesthesia consent signed with patient.  ASA Score: 3     Day of Surgery Review of History & Physical:     H&P completed by  Anesthesiologist.       Ready For Surgery From Anesthesia Perspective.

## 2018-10-29 NOTE — INTERVAL H&P NOTE
The patient has been examined and the H&P has been reviewed:    I concur with the findings and no changes have occurred since H&P was written. Procedure was confirmed with Ms. Molina. She confirmed EVETTE/BS 2/2 AUB. We again confirmed ovarian preservation. Patient denies any questions or concerns at this time. Consents are in chart. Patient can proceed to OR.      Surgery risks, benefits and alternative options discussed and understood by patient/family.          Active Hospital Problems    Diagnosis  POA    Abnormal uterine bleeding (AUB) [N93.9]  Yes      Resolved Hospital Problems   No resolved problems to display.

## 2018-10-29 NOTE — TRANSFER OF CARE
"Anesthesia Transfer of Care Note    Patient: Bijal Molina    Procedure(s) Performed: Procedure(s) (LRB):  HYSTERECTOMY, TOTAL, ABDOMINAL (N/A)  SALPINGECTOMY (Bilateral)    Patient location: PACU    Anesthesia Type: general    Transport from OR: Transported from OR on room air with adequate spontaneous ventilation    Post pain: adequate analgesia    Post assessment: no apparent anesthetic complications and tolerated procedure well    Post vital signs: stable    Level of consciousness: awake, alert and oriented    Nausea/Vomiting: no nausea/vomiting    Complications: none    Transfer of care protocol was followed      Last vitals:   Visit Vitals  BP (!) 152/62   Pulse 66   Temp 36.9 °C (98.4 °F) (Axillary)   Resp 14   Ht 5' 8" (1.727 m)   Wt (!) 164.2 kg (362 lb)   LMP 10/10/2018   SpO2 100%   Breastfeeding? No   BMI 55.04 kg/m²     "

## 2018-10-30 PROBLEM — D64.9 SYMPTOMATIC ANEMIA: Status: ACTIVE | Noted: 2018-10-30

## 2018-10-30 PROBLEM — I10 ESSENTIAL HYPERTENSION: Status: ACTIVE | Noted: 2018-10-30

## 2018-10-30 LAB
ANION GAP SERPL CALC-SCNC: 7 MMOL/L
ANISOCYTOSIS BLD QL SMEAR: SLIGHT
BASOPHILS # BLD AUTO: 0.02 K/UL
BASOPHILS NFR BLD: 0.3 %
BUN SERPL-MCNC: 10 MG/DL
CALCIUM SERPL-MCNC: 8 MG/DL
CHLORIDE SERPL-SCNC: 104 MMOL/L
CO2 SERPL-SCNC: 24 MMOL/L
CREAT SERPL-MCNC: 0.7 MG/DL
DIFFERENTIAL METHOD: ABNORMAL
EOSINOPHIL # BLD AUTO: 0.1 K/UL
EOSINOPHIL NFR BLD: 1.5 %
ERYTHROCYTE [DISTWIDTH] IN BLOOD BY AUTOMATED COUNT: 18.6 %
EST. GFR  (AFRICAN AMERICAN): >60 ML/MIN/1.73 M^2
EST. GFR  (NON AFRICAN AMERICAN): >60 ML/MIN/1.73 M^2
GLUCOSE SERPL-MCNC: 118 MG/DL
HCT VFR BLD AUTO: 22.2 %
HGB BLD-MCNC: 6.4 G/DL
HYPOCHROMIA BLD QL SMEAR: ABNORMAL
LYMPHOCYTES # BLD AUTO: 1.6 K/UL
LYMPHOCYTES NFR BLD: 19.7 %
MCH RBC QN AUTO: 20.4 PG
MCHC RBC AUTO-ENTMCNC: 28.8 G/DL
MCV RBC AUTO: 71 FL
MONOCYTES # BLD AUTO: 0.8 K/UL
MONOCYTES NFR BLD: 10.6 %
NEUTROPHILS # BLD AUTO: 5.4 K/UL
NEUTROPHILS NFR BLD: 68 %
PLATELET # BLD AUTO: 208 K/UL
PLATELET BLD QL SMEAR: ABNORMAL
PMV BLD AUTO: 8.9 FL
POTASSIUM SERPL-SCNC: 4.2 MMOL/L
RBC # BLD AUTO: 3.14 M/UL
SODIUM SERPL-SCNC: 135 MMOL/L
WBC # BLD AUTO: 7.96 K/UL

## 2018-10-30 PROCEDURE — 25000003 PHARM REV CODE 250: Performed by: OBSTETRICS & GYNECOLOGY

## 2018-10-30 PROCEDURE — 80048 BASIC METABOLIC PNL TOTAL CA: CPT

## 2018-10-30 PROCEDURE — 11000001 HC ACUTE MED/SURG PRIVATE ROOM

## 2018-10-30 PROCEDURE — 99233 SBSQ HOSP IP/OBS HIGH 50: CPT | Mod: ,,, | Performed by: OBSTETRICS & GYNECOLOGY

## 2018-10-30 PROCEDURE — S0028 INJECTION, FAMOTIDINE, 20 MG: HCPCS | Performed by: OBSTETRICS & GYNECOLOGY

## 2018-10-30 PROCEDURE — 63600175 PHARM REV CODE 636 W HCPCS: Performed by: OBSTETRICS & GYNECOLOGY

## 2018-10-30 PROCEDURE — 36415 COLL VENOUS BLD VENIPUNCTURE: CPT

## 2018-10-30 PROCEDURE — 85025 COMPLETE CBC W/AUTO DIFF WBC: CPT

## 2018-10-30 PROCEDURE — 36430 TRANSFUSION BLD/BLD COMPNT: CPT

## 2018-10-30 RX ORDER — LABETALOL HYDROCHLORIDE 5 MG/ML
20 INJECTION, SOLUTION INTRAVENOUS EVERY 4 HOURS PRN
Status: DISCONTINUED | OUTPATIENT
Start: 2018-10-30 | End: 2018-11-01 | Stop reason: HOSPADM

## 2018-10-30 RX ORDER — LISINOPRIL 20 MG/1
40 TABLET ORAL DAILY
Status: DISCONTINUED | OUTPATIENT
Start: 2018-10-31 | End: 2018-11-01 | Stop reason: HOSPADM

## 2018-10-30 RX ORDER — FAMOTIDINE 20 MG/1
20 TABLET, FILM COATED ORAL DAILY
Status: DISCONTINUED | OUTPATIENT
Start: 2018-10-31 | End: 2018-11-01 | Stop reason: HOSPADM

## 2018-10-30 RX ORDER — BUTALBITAL, ACETAMINOPHEN AND CAFFEINE 50; 325; 40 MG/1; MG/1; MG/1
1 TABLET ORAL EVERY 6 HOURS PRN
Status: DISCONTINUED | OUTPATIENT
Start: 2018-10-30 | End: 2018-11-01 | Stop reason: HOSPADM

## 2018-10-30 RX ORDER — HYDROCODONE BITARTRATE AND ACETAMINOPHEN 10; 325 MG/1; MG/1
1 TABLET ORAL EVERY 4 HOURS PRN
Status: DISCONTINUED | OUTPATIENT
Start: 2018-10-30 | End: 2018-11-01 | Stop reason: HOSPADM

## 2018-10-30 RX ORDER — HYDROCODONE BITARTRATE AND ACETAMINOPHEN 500; 5 MG/1; MG/1
TABLET ORAL
Status: DISCONTINUED | OUTPATIENT
Start: 2018-10-30 | End: 2018-10-31

## 2018-10-30 RX ORDER — ACETAZOLAMIDE 500 MG/1
500 CAPSULE, EXTENDED RELEASE ORAL 2 TIMES DAILY
Status: DISCONTINUED | OUTPATIENT
Start: 2018-10-30 | End: 2018-11-01 | Stop reason: HOSPADM

## 2018-10-30 RX ORDER — HYDROCODONE BITARTRATE AND ACETAMINOPHEN 5; 325 MG/1; MG/1
1 TABLET ORAL EVERY 4 HOURS PRN
Status: DISCONTINUED | OUTPATIENT
Start: 2018-10-30 | End: 2018-11-01 | Stop reason: HOSPADM

## 2018-10-30 RX ADMIN — MUPIROCIN 1 G: 20 OINTMENT TOPICAL at 08:10

## 2018-10-30 RX ADMIN — ACETAZOLAMIDE 500 MG: 500 CAPSULE, EXTENDED RELEASE ORAL at 10:10

## 2018-10-30 RX ADMIN — HYDROCODONE BITARTRATE AND ACETAMINOPHEN 1 TABLET: 10; 325 TABLET ORAL at 11:10

## 2018-10-30 RX ADMIN — MUPIROCIN 1 G: 20 OINTMENT TOPICAL at 09:10

## 2018-10-30 RX ADMIN — HYDROCODONE BITARTRATE AND ACETAMINOPHEN 1 TABLET: 5; 325 TABLET ORAL at 08:10

## 2018-10-30 RX ADMIN — DEXTROSE, SODIUM CHLORIDE, AND POTASSIUM CHLORIDE: 5; .9; .15 INJECTION INTRAVENOUS at 01:10

## 2018-10-30 RX ADMIN — DOCUSATE SODIUM AND SENNOSIDES 1 TABLET: 8.6; 5 TABLET, FILM COATED ORAL at 09:10

## 2018-10-30 RX ADMIN — HYDROCODONE BITARTRATE AND ACETAMINOPHEN 1 TABLET: 5; 325 TABLET ORAL at 01:10

## 2018-10-30 RX ADMIN — FAMOTIDINE 20 MG: 10 INJECTION, SOLUTION INTRAVENOUS at 08:10

## 2018-10-30 RX ADMIN — IBUPROFEN 600 MG: 600 TABLET ORAL at 06:10

## 2018-10-30 RX ADMIN — DOCUSATE SODIUM AND SENNOSIDES 1 TABLET: 8.6; 5 TABLET, FILM COATED ORAL at 08:10

## 2018-10-30 RX ADMIN — BUTALBITAL, ACETAMINOPHEN AND CAFFEINE 1 TABLET: 50; 325; 40 TABLET ORAL at 09:10

## 2018-10-30 RX ADMIN — HYDROCODONE BITARTRATE AND ACETAMINOPHEN 1 TABLET: 10; 325 TABLET ORAL at 05:10

## 2018-10-30 RX ADMIN — IBUPROFEN 600 MG: 600 TABLET ORAL at 11:10

## 2018-10-30 RX ADMIN — KETOROLAC TROMETHAMINE 30 MG: 30 INJECTION, SOLUTION INTRAMUSCULAR at 06:10

## 2018-10-30 RX ADMIN — IBUPROFEN 600 MG: 600 TABLET ORAL at 01:10

## 2018-10-30 RX ADMIN — LABETALOL HYDROCHLORIDE 20 MG: 5 INJECTION INTRAVENOUS at 04:10

## 2018-10-30 NOTE — OP NOTE
DATE OF PROCEDURE: 10/29/2018    PREOPERATIVE DIAGNOSES:  1. Abnormal Uterine Bleeding - Leiomyoma  2. Enlarged Uterus  3. Morbid Obesity  4. Hypertension    POSTOPERATIVE DIAGNOSIS:    1.  Same    PROCEDURES PERFORMED:  1. Exploratory laparotomy.  2. Total abdominal hysterectomy  3. Bilateral Salpingectomy  4. Left Oophorectomy      SURGEON:  Margaret Alexis MD    ASSISTANT:  Bentley Ornelas M.D.     ESTIMATED BLOOD LOSS:  800cc    INTRAVENOUS FLUIDS:  2300cc    URINE OUTPUT: 700cc    OPERATIVE FINDINGS:  1.  Uterus palpable above the umbilicus but mobile on exam  2. Pfannenstiel skin incision was carried down to fascia and extended vertically  3. Once intraperitoneal, the uterus though mobile had multiple large uterine fibroids anteriorly, at the fundus and with largest fibroid found posteriorly approximately 6cm in size  4. Pitressin was used to control blood loss while myomectomy was performed to remove the anterior and fundal fibroid to allow the uterus to be exteriorized  5. Hysterectomy was then able to be performed  6. Bladder was densely adhered to the cervix which was carefully dissected off sharply  7. After the left fallopian tube and utero-ovarian were ligated and cut the left ovary developed a large hematoma at which time it was removed. The left IP was ligated and cut.   8. The right ovary proceeded to twist upon the right IP. With concern for ovarian torsion, the ovary was carefully sutured in place.   9. The vaginal cuff was closed in normal fashion  10. The abdominal cavity was irrigated and hemostasis was observed.       PROCEDURE IN DETAIL:  .  The patient was taken to the Operating Room where general anesthesia was achieved.  Vagina and abdomen were prepped and draped in normal sterile fashion. The patient was placed in supine position. The patient received 3 g of Ancef IV perioperatively.  At this time, a timeout was performed.  A pfannenstiel skin incision was made with a scalpel and the  incision was carried down to the fascia with the Bovie. The fascial layer was incised and incision extended vertically.  The peritoneum was identified and entered after tenting with hemostats to ensure that no bowel was underneath.  Metzenbaum scissors were used to enter peritoneum.  The incision was extended with blunt traction.  Intraabdominal survey was performed. The uterus was enlarged with multiple, enlarged submucosal fibroids. The uterus was unable to be exteriorized 2/2 anterior, lateral, and posterior submucosal fibroids. Pitressin was injected into the serosa overlying the anterior fibroid to decrease blood loss. The bovie was used to and carried down to the fibroid. The fibroid was grasped with a be.  Using the bovie, the fibroid was enucleated from the underlying myometrium. Two additional anterior and fundal fibroids were removed in a smiliar fashion allowing the uterus to be exteriorized.       The bowel was packed with moist laps and an O'Andrade O'Luther retractor was used to hold the bowel in place and out of view.  Upon survey, it was revealed that the left fallopian tube and utero-ovarian ligament were densely adhered to the left side of the ovary. Enseal was used to ligated and cut the utero-ovarian ligament to assist with mobilization of the uterus. The right utero-ovarian ligament was then ligated and cut releasing the right ovary. The round ligament was identified bilaterally.  The round ligament on the right was cauterized and transected and the broad ligament was taken down on the patient's right side to create a bladder flap. In similar fashion, the left round ligament was identified, cauterized and transected. The posterior aspect of the broad ligament was taken down in order to visualize the ureters.  Next, the uterine arteries were skeletonized, cauterized and transected also using the Enseal. Once below the uterine arteries, the bovie was then used to amputate the uterus off of the  cervix to assist with visualization. The bladder was densely adhered to the cervix. At this time, Metzenbaum scissrs were used to dissect the bladder off of there cervix. The cardinal and uterosacral ligaments were identified,  clamped and cut and then suture ligated with a transfixing stitch bilaterally.  The cervicovaginal junction was palpated and the cervix was clamped bilaterally at that junction.  The cervix was then amputated and specimen was sent to Pathology.  The cuff was then closed with interrupted figure-of-8 sutures of 0 Vicryl. After the left fallopian tube and utero-ovarian were ligated and cut the left ovary developed a large hematoma at which time it was removed. The left IP was ligated and cut. The right ovary proceeded to twist upon the right IP. With concern for ovarian torsion, the ovary was carefully sutured in place.    Irrigation was performed and hemostasis was noted. The O'Andrade O'Luther retractor was removed.  Prior to closing the fascia, lap, needle and sponge count was correct x1.  The fascia was closed with 0 Vicryl sutures.  The subcutaneous tissue was irrigated and the subcutaneous was closed with 2-0 vicryl  in an interrupted fashion.  The skin was then closed using 4-0 monocryl.    The patient tolerated the procedure well.  Sponge, lap and needle counts were correct x2.  The patient was taken to the Recovery Room in stable condition.    Margaret Kitchen MD

## 2018-10-30 NOTE — NURSING
Spoke with Dr. Kitchen via telephone in reference to blood consent dated 10-, yet pt admitted on 10-, informed her that we needed a new blood consent, she states that the consent on chart is pt's pre-op consent and it is current and good.

## 2018-10-30 NOTE — PHYSICIAN QUERY
"PT Name: Bijal Molina  MR #: [id number]     Physician Query Form - Documentation Clarification      CDS/: ELBERT Peraza,[id number]           Contact information:[email or web address]    This form is a permanent document in the medical record.     Query Date: October 30, 2018    By submitting this query, we are merely seeking further clarification of documentation. Please utilize your independent clinical judgment when addressing the question(s) below.    The Medical record reflects the following:    Supporting Clinical Findings Location in Medical Record   Obesity    BMI=55.2  Ht=5' 8" (1.727 m)  Js=613.2 kg (362 Lb) Anesthesia note 10/29    Anthropometrics 10/29                                                                                      Doctor, Please specify diagnosis or diagnoses associated with above clinical findings.    Provider Use Only      [ x] Morbid obesity  [  ] Other, please specify:______________________________                                                                                                                      Clinically Undetermined               "

## 2018-10-30 NOTE — PROGRESS NOTES
Notified MD Rhiannon of patient not having any oral prn meds for pain post PCA removal. MD states to continue PCA til the morning with catheter removal. Confirmed with MD the patient has had PCA since am today. Confirmed to continue with PCA til am 10/30

## 2018-10-30 NOTE — PLAN OF CARE
Problem: Patient Care Overview  Goal: Plan of Care Review  Pt turning in bed. VSS. No acute distress noted. Roth catheter patent, cloudy urine noted. Denies flank pain. IV fluids infusing. Currently on clear liquids with hypoactive BS. No vaginal bleeding. Dressing dry to lower abdomen.

## 2018-10-31 LAB
ANISOCYTOSIS BLD QL SMEAR: ABNORMAL
BASOPHILS # BLD AUTO: 0.03 K/UL
BASOPHILS NFR BLD: 0.4 %
BILIRUB UR QL STRIP: NEGATIVE
CLARITY UR: CLEAR
COLOR UR: YELLOW
DIFFERENTIAL METHOD: ABNORMAL
EOSINOPHIL # BLD AUTO: 0.3 K/UL
EOSINOPHIL NFR BLD: 3.1 %
ERYTHROCYTE [DISTWIDTH] IN BLOOD BY AUTOMATED COUNT: 18.3 %
GLUCOSE UR QL STRIP: NEGATIVE
HCT VFR BLD AUTO: 25.2 %
HGB BLD-MCNC: 7.3 G/DL
HGB UR QL STRIP: NEGATIVE
HYPOCHROMIA BLD QL SMEAR: ABNORMAL
KETONES UR QL STRIP: NEGATIVE
LEUKOCYTE ESTERASE UR QL STRIP: NEGATIVE
LYMPHOCYTES # BLD AUTO: 1.4 K/UL
LYMPHOCYTES NFR BLD: 16.8 %
MCH RBC QN AUTO: 20.9 PG
MCHC RBC AUTO-ENTMCNC: 29 G/DL
MCV RBC AUTO: 72 FL
MONOCYTES # BLD AUTO: 0.9 K/UL
MONOCYTES NFR BLD: 11.2 %
NEUTROPHILS # BLD AUTO: 5.8 K/UL
NEUTROPHILS NFR BLD: 68.7 %
NITRITE UR QL STRIP: NEGATIVE
OVALOCYTES BLD QL SMEAR: ABNORMAL
PH UR STRIP: 8 [PH] (ref 5–8)
PLATELET # BLD AUTO: 211 K/UL
PLATELET BLD QL SMEAR: ABNORMAL
PMV BLD AUTO: 9.2 FL
POLYCHROMASIA BLD QL SMEAR: ABNORMAL
PROT UR QL STRIP: NEGATIVE
RBC # BLD AUTO: 3.5 M/UL
SP GR UR STRIP: 1.01 (ref 1–1.03)
URN SPEC COLLECT METH UR: ABNORMAL
UROBILINOGEN UR STRIP-ACNC: ABNORMAL EU/DL
WBC # BLD AUTO: 8.43 K/UL

## 2018-10-31 PROCEDURE — 11000001 HC ACUTE MED/SURG PRIVATE ROOM

## 2018-10-31 PROCEDURE — 36415 COLL VENOUS BLD VENIPUNCTURE: CPT

## 2018-10-31 PROCEDURE — 99232 SBSQ HOSP IP/OBS MODERATE 35: CPT | Mod: ,,, | Performed by: OBSTETRICS & GYNECOLOGY

## 2018-10-31 PROCEDURE — 81003 URINALYSIS AUTO W/O SCOPE: CPT

## 2018-10-31 PROCEDURE — 25000003 PHARM REV CODE 250: Performed by: OBSTETRICS & GYNECOLOGY

## 2018-10-31 PROCEDURE — 85025 COMPLETE CBC W/AUTO DIFF WBC: CPT

## 2018-10-31 RX ORDER — DOCUSATE SODIUM 100 MG/1
100 CAPSULE, LIQUID FILLED ORAL 2 TIMES DAILY
Status: DISCONTINUED | OUTPATIENT
Start: 2018-10-31 | End: 2018-11-01 | Stop reason: HOSPADM

## 2018-10-31 RX ADMIN — IBUPROFEN 600 MG: 600 TABLET ORAL at 11:10

## 2018-10-31 RX ADMIN — ACETAZOLAMIDE 500 MG: 500 CAPSULE, EXTENDED RELEASE ORAL at 08:10

## 2018-10-31 RX ADMIN — IBUPROFEN 600 MG: 600 TABLET ORAL at 05:10

## 2018-10-31 RX ADMIN — LISINOPRIL 40 MG: 20 TABLET ORAL at 08:10

## 2018-10-31 RX ADMIN — DOCUSATE SODIUM AND SENNOSIDES 1 TABLET: 8.6; 5 TABLET, FILM COATED ORAL at 08:10

## 2018-10-31 RX ADMIN — FAMOTIDINE 20 MG: 20 TABLET ORAL at 08:10

## 2018-10-31 RX ADMIN — MUPIROCIN 1 G: 20 OINTMENT TOPICAL at 08:10

## 2018-10-31 RX ADMIN — DOCUSATE SODIUM 100 MG: 100 CAPSULE, LIQUID FILLED ORAL at 12:10

## 2018-10-31 RX ADMIN — HYDROCODONE BITARTRATE AND ACETAMINOPHEN 1 TABLET: 10; 325 TABLET ORAL at 12:10

## 2018-10-31 RX ADMIN — HYDROCODONE BITARTRATE AND ACETAMINOPHEN 1 TABLET: 10; 325 TABLET ORAL at 10:10

## 2018-10-31 RX ADMIN — DIPHENHYDRAMINE HYDROCHLORIDE 25 MG: 25 CAPSULE ORAL at 10:10

## 2018-10-31 RX ADMIN — Medication 1 CAPSULE: at 12:10

## 2018-10-31 RX ADMIN — IBUPROFEN 600 MG: 600 TABLET ORAL at 12:10

## 2018-10-31 RX ADMIN — DOCUSATE SODIUM 100 MG: 100 CAPSULE, LIQUID FILLED ORAL at 08:10

## 2018-10-31 RX ADMIN — HYDROCODONE BITARTRATE AND ACETAMINOPHEN 1 TABLET: 10; 325 TABLET ORAL at 05:10

## 2018-10-31 NOTE — HPI
Ms. Piña is a 38 year old P3 who presents for scheduled EVETTE/BS 2/2 AUB. The patient complains of heavy menses. Her LMP was 09/19/2018 and bled for 6 days. She uses 3 pads at a time and changes pads every hour to 30 minutes without blood clots. She reports that she was told that she was anemic recently and believes that it is secondary to menses.

## 2018-10-31 NOTE — HOSPITAL COURSE
10/30/2018 - POD#1 s/p EVETTE/BS/Left oophorectomy. Patient is ambulating, voiding, and tolerating PO. She was taken off of the PCA this morning and transitioned to PO pain medication. She complains of feeling dizzy when ambulating to the restroom with assistance this morning.   10/31/2018 - POD#2 s/p EVETTE/BS/Left oophorectomy. Patient is ambulating, voiding and tolerating PO. She is reports that she has not been able to sleep all night 2/2 urinary frequency. She also complains of pain at this time.   11/01/2018 - POD#3 s/p EVETTE/BS/Left oophorectomy. Patient is ambulating, voiding, and tolerating PO. She reports that pain much improved. She reports that she was able to sleep last night. She also had a bowel movement this morning which she reports made her feel much better.

## 2018-10-31 NOTE — SUBJECTIVE & OBJECTIVE
Interval History: Ms. Molina reports feeling sleepy this morning. She reports that she was up all night voiding. She denies pain with urination. She reports that it is a lot of urine when she does void. She She reports that she is tolerating a regular diet and passing gas throughout yesterday. She reports that her pain is not well controlled at this time and she is awaiting her pain medication. She does c/o spots in her vision but thinks that it is because she has not taken her Diamox since the day before sx.     Scheduled Meds:   acetaZOLAMIDE  500 mg Oral BID    famotidine  20 mg Oral Daily    ibuprofen  600 mg Oral Q6H    lisinopril  40 mg Oral Daily    mupirocin  1 g Nasal BID    senna-docusate 8.6-50 mg  1 tablet Oral BID     Continuous Infusions:  PRN Meds:sodium chloride, bisacodyl, butalbital-acetaminophen-caffeine -40 mg, diphenhydrAMINE, diphenhydrAMINE, HYDROcodone-acetaminophen, HYDROcodone-acetaminophen, labetalol, ondansetron, promethazine (PHENERGAN) IVPB    Review of patient's allergies indicates:   Allergen Reactions    Depo-provera contraceptive Blisters       Objective:     Vital Signs (Most Recent):  Temp: 97.4 °F (36.3 °C) (10/31/18 0810)  Pulse: 84 (10/31/18 0810)  Resp: 18 (10/31/18 0810)  BP: (!) 141/71 (10/31/18 0810)  SpO2: 100 % (10/30/18 1657) Vital Signs (24h Range):  Temp:  [97.4 °F (36.3 °C)-99.4 °F (37.4 °C)] 97.4 °F (36.3 °C)  Pulse:  [80-97] 84  Resp:  [17-20] 18  SpO2:  [100 %] 100 %  BP: (125-183)/() 141/71     Weight: (!) 164.2 kg (362 lb)  Body mass index is 55.04 kg/m².  Patient's last menstrual period was 10/10/2018.    I&O (Last 24H):    Intake/Output Summary (Last 24 hours) at 10/31/2018 0912  Last data filed at 10/31/2018 0843  Gross per 24 hour   Intake 2600 ml   Output 450 ml   Net 2150 ml       Physical Exam:   Constitutional: She is oriented to person, place, and time. She appears well-developed and well-nourished. No distress.       Cardiovascular:  Normal rate and normal heart sounds.     Pulmonary/Chest: Effort normal. No respiratory distress.        Abdominal: Soft. She exhibits abdominal incision. There is tenderness.                 Neurological: She is alert and oriented to person, place, and time.    Skin: Skin is warm and dry.    Psychiatric: She has a normal mood and affect.       Laboratory:  CBC:   Recent Labs   Lab 10/31/18  0644   WBC 8.43   RBC 3.50*   HGB 7.3*   HCT 25.2*      MCV 72*   MCH 20.9*   MCHC 29.0*

## 2018-10-31 NOTE — PROGRESS NOTES
Spoke with Dr Kitchen via telephone and given update on status of patient. MD gave orders for additional meds and lab work for 10/31/18.

## 2018-10-31 NOTE — PHYSICIAN QUERY
"PT Name: Bijal Molina  MR #: [id number]    Physician Query Form - Hematology Clarification      CDS/: ELBERT Peraza,[email or web address]           Contact information:[email or web address]    This form is a permanent document in the medical record.      Query Date: October 31, 2018    By submitting this query, we are merely seeking further clarification of documentation. Please utilize your independent clinical judgment when addressing the question(s) below.    The Medical record contains the following:   Indicators  Supporting Clinical Findings Location in Medical Record   X "Anemia" documented Symptomatic anemia OB Progress note 10/31@926am   X H & H = Hgb=6.4-7.3  Hct=22.2-25.2 LAB 10/30-10/31    BP =                     HR=      "GI bleeding" documented     X Acute bleeding (Non GI site) 38 year old P3 who presents for scheduled EVETTE/BS 2/2 AUB. The patient complains of heavy menses. Her LMP was 09/19/2018 and bled for 6 days. She uses 3 pads at a time and changes pads every hour to 30 minutes without blood clots. She reports that she was told that she was anemic recently and believes that it is secondary to menses   OB Progress note 10/31@926am   X Transfusion(s) s/p 1U PRBCs OB Progress note 10/31@926am   X Treatment: Start iron and colace OB Progress note 10/31@926am    Other:        Provider, please specify diagnosis or diagnoses associated with above clinical findings.    x Acute blood loss anemia    Iron deficiency anemia   x Chronic blood loss anemia      Other Hematological Diagnosis (please specify):      Clinically Undetermined       Please document in your progress notes daily for the duration of treatment, until resolved, and include in your discharge summary.                                                                                                      "

## 2018-10-31 NOTE — PROGRESS NOTES
[facility]  Obstetrics & Gynecology  Progress Note    Patient Name: Bijal Molina  MRN: [id number]  Admission Date: 10/29/2018  Primary Care Provider: Misbah Oshea MD  Principal Problem: S/P EVETTE (total abdominal hysterectomy)    Subjective:     HPI:  Ms. Piña is a 38 year old P3 who presents for scheduled EVETTE/BS 2/2 AUB. The patient complains of heavy menses. Her LMP was 09/19/2018 and bled for 6 days. She uses 3 pads at a time and changes pads every hour to 30 minutes without blood clots. She reports that she was told that she was anemic recently and believes that it is secondary to menses.         Interval History: Ms. Molina is doing well this morning. She has ambulated with assistance but complains of feeling dizzy and with blurry vision. She has passed flatus, voided since lemus catheter was removed and tolerated a regular diet. She reports that pain is controlled at this time.     Scheduled Meds:   acetaZOLAMIDE  500 mg Oral BID    [START ON 10/31/2018] famotidine  20 mg Oral Daily    ibuprofen  600 mg Oral Q6H    mupirocin  1 g Nasal BID    senna-docusate 8.6-50 mg  1 tablet Oral BID     Continuous Infusions:  PRN Meds:sodium chloride, bisacodyl, butalbital-acetaminophen-caffeine -40 mg, diphenhydrAMINE, diphenhydrAMINE, HYDROcodone-acetaminophen, HYDROcodone-acetaminophen, labetalol, ondansetron, promethazine (PHENERGAN) IVPB    Review of patient's allergies indicates:   Allergen Reactions    Depo-provera contraceptive Blisters       Objective:     Vital Signs (Most Recent):  Temp: 99.4 °F (37.4 °C) (10/30/18 1950)  Pulse: 91 (10/30/18 1950)  Resp: 20 (10/30/18 1950)  BP: (!) 164/76 (10/30/18 1950)  SpO2: 100 % (10/30/18 1657) Vital Signs (24h Range):  Temp:  [97.8 °F (36.6 °C)-99.4 °F (37.4 °C)] 99.4 °F (37.4 °C)  Pulse:  [67-97] 91  Resp:  [17-20] 20  SpO2:  [95 %-100 %] 100 %  BP: (118-183)/() 164/76     Weight: (!) 164.2 kg (362 lb)  Body mass index is 55.04  kg/m².  Patient's last menstrual period was 10/10/2018.    I&O (Last 24H):    Intake/Output Summary (Last 24 hours) at 10/30/2018 2235  Last data filed at 10/30/2018 1729  Gross per 24 hour   Intake 4110 ml   Output 800 ml   Net 3310 ml       Physical Exam:   Constitutional: She is oriented to person, place, and time. She appears well-developed and well-nourished.       Cardiovascular:   Mildly tachycardiac    Pulmonary/Chest: Effort normal and breath sounds normal.        Abdominal: Soft. Bowel sounds are normal. She exhibits abdominal incision. There is tenderness.                 Neurological: She is alert and oriented to person, place, and time.    Skin: Skin is warm and dry.    Psychiatric: She has a normal mood and affect.       Laboratory:  BMP:   Recent Labs   Lab 10/30/18  0702   *   *   K 4.2      CO2 24   BUN 10   CREATININE 0.7   CALCIUM 8.0*     CBC:   Recent Labs   Lab 10/30/18  0702   WBC 7.96   RBC 3.14*   HGB 6.4*   HCT 22.2*      MCV 71*   MCH 20.4*   MCHC 28.8*     Assessment/Plan:     * S/P EVETTE (total abdominal hysterectomy)/BS/left oophorectomy    - POD#1 s/p EVETTE/BS/Left oophorectomy  - PCA discontinued with PO pain medication to be started  - Roth catheter removed, voiding  - Passing flatus  - Ambulate with assistance throughout the day  - IS every hour while awake  - QUANG hose and SCDs while in bed   - Tolerating a regular diet  - Discontinue IVF     Essential hypertension    - Will restart home medication as patient is consistently hypertensive  - CMP normal     Symptomatic anemia    - H/H this morning 6/22  - Will trnasfuse one unit of blood  - Repeat CBC in the AM     Papilledema associated with increased intracranial pressure    - Continue Diamox and Fioricet         Margaret Alexis MD  Obstetrics & Gynecology  [facility]

## 2018-10-31 NOTE — ASSESSMENT & PLAN NOTE
- POD#1 s/p EVETTE/BS/Left oophorectomy  - PCA discontinued with PO pain medication to be started  - Roth catheter removed, voiding  - Passing flatus  - Ambulate with assistance throughout the day  - IS every hour while awake  - QUANG hose and SCDs while in bed   - Tolerating a regular diet  - Discontinue IVF

## 2018-10-31 NOTE — PROGRESS NOTES
[facility]  Obstetrics & Gynecology  Progress Note    Patient Name: Bijal Molina  MRN: [id number]  Admission Date: 10/29/2018  Primary Care Provider: Misbah Oshea MD  Principal Problem: S/P EVETTE (total abdominal hysterectomy)    Subjective:     HPI:  Ms. Piña is a 38 year old P3 who presents for scheduled EVETTE/BS 2/2 AUB. The patient complains of heavy menses. Her LMP was 09/19/2018 and bled for 6 days. She uses 3 pads at a time and changes pads every hour to 30 minutes without blood clots. She reports that she was told that she was anemic recently and believes that it is secondary to menses.         Interval History: Ms. Molina reports feeling sleepy this morning. She reports that she was up all night voiding. She denies pain with urination. She reports that it is a lot of urine when she does void. She She reports that she is tolerating a regular diet and passing gas throughout yesterday. She reports that her pain is not well controlled at this time and she is awaiting her pain medication. She does c/o spots in her vision but thinks that it is because she has not taken her Diamox since the day before sx.     Scheduled Meds:   acetaZOLAMIDE  500 mg Oral BID    famotidine  20 mg Oral Daily    ibuprofen  600 mg Oral Q6H    lisinopril  40 mg Oral Daily    mupirocin  1 g Nasal BID    senna-docusate 8.6-50 mg  1 tablet Oral BID     Continuous Infusions:  PRN Meds:sodium chloride, bisacodyl, butalbital-acetaminophen-caffeine -40 mg, diphenhydrAMINE, diphenhydrAMINE, HYDROcodone-acetaminophen, HYDROcodone-acetaminophen, labetalol, ondansetron, promethazine (PHENERGAN) IVPB    Review of patient's allergies indicates:   Allergen Reactions    Depo-provera contraceptive Blisters       Objective:     Vital Signs (Most Recent):  Temp: 97.4 °F (36.3 °C) (10/31/18 0810)  Pulse: 84 (10/31/18 0810)  Resp: 18 (10/31/18 0810)  BP: (!) 141/71 (10/31/18 0810)  SpO2: 100 % (10/30/18 1657) Vital  Signs (24h Range):  Temp:  [97.4 °F (36.3 °C)-99.4 °F (37.4 °C)] 97.4 °F (36.3 °C)  Pulse:  [80-97] 84  Resp:  [17-20] 18  SpO2:  [100 %] 100 %  BP: (125-183)/() 141/71     Weight: (!) 164.2 kg (362 lb)  Body mass index is 55.04 kg/m².  Patient's last menstrual period was 10/10/2018.    I&O (Last 24H):    Intake/Output Summary (Last 24 hours) at 10/31/2018 0912  Last data filed at 10/31/2018 0843  Gross per 24 hour   Intake 2600 ml   Output 450 ml   Net 2150 ml       Physical Exam:   Constitutional: She is oriented to person, place, and time. She appears well-developed and well-nourished. No distress.       Cardiovascular: Normal rate and normal heart sounds.     Pulmonary/Chest: Effort normal. No respiratory distress.        Abdominal: Soft. She exhibits abdominal incision. There is tenderness.                 Neurological: She is alert and oriented to person, place, and time.    Skin: Skin is warm and dry.    Psychiatric: She has a normal mood and affect.       Laboratory:  CBC:   Recent Labs   Lab 10/31/18  0644   WBC 8.43   RBC 3.50*   HGB 7.3*   HCT 25.2*      MCV 72*   MCH 20.9*   MCHC 29.0*     Assessment/Plan:     * S/P EVETTE (total abdominal hysterectomy)/BS/left oophorectomy    - POD#2 s/p EVETTE/BS/Left oophorectomy  - Control pain with PO pain medication  - Voiding but with urinary frequency so will order UA  - Passing flatus  - Encourage Ambulation  - IS every hour while awake  - QUANG hose and SCDs while in bed   - Tolerating a regular diet     Essential hypertension    - Home ACE inhibitor restarted  - Labetalol PRN     Symptomatic anemia    - H/H this morning 7.3/25.2; Appropriate increase  - s/p 1U PRBCs  - Start iron and colace     Papilledema associated with increased intracranial pressure    - Continue Diamox and Fioricet         Margaret Alexis MD  Obstetrics & Gynecology  [facility]

## 2018-10-31 NOTE — ASSESSMENT & PLAN NOTE
- POD#2 s/p EVETTE/BS/Left oophorectomy  - Control pain with PO pain medication  - Voiding but with urinary frequency so will order UA  - Passing flatus  - Encourage Ambulation  - IS every hour while awake  - QUANG hose and Vern while in bed   - Tolerating a regular diet

## 2018-10-31 NOTE — PLAN OF CARE
Problem: Patient Care Overview  Goal: Discharge Needs Assessment  Outcome: Revised  Patient tolerating diet and urinating.  Tolerated blood transfusion earlier in day.  Monitoring BP closely. Restarted on Lisinopril and Diamox.  Med with Fiorcet for Migraine. Percocet and Motrin effective for operative pain. Repeat lab work scheduled for am.

## 2018-10-31 NOTE — SUBJECTIVE & OBJECTIVE
Interval History: Ms. Molina is doing well this morning. She has ambulated with assistance but complains of feeling dizzy and with blurry vision. She has passed flatus, voided since lemus catheter was removed and tolerated a regular diet. She reports that pain is controlled at this time.     Scheduled Meds:   acetaZOLAMIDE  500 mg Oral BID    [START ON 10/31/2018] famotidine  20 mg Oral Daily    ibuprofen  600 mg Oral Q6H    mupirocin  1 g Nasal BID    senna-docusate 8.6-50 mg  1 tablet Oral BID     Continuous Infusions:  PRN Meds:sodium chloride, bisacodyl, butalbital-acetaminophen-caffeine -40 mg, diphenhydrAMINE, diphenhydrAMINE, HYDROcodone-acetaminophen, HYDROcodone-acetaminophen, labetalol, ondansetron, promethazine (PHENERGAN) IVPB    Review of patient's allergies indicates:   Allergen Reactions    Depo-provera contraceptive Blisters       Objective:     Vital Signs (Most Recent):  Temp: 99.4 °F (37.4 °C) (10/30/18 1950)  Pulse: 91 (10/30/18 1950)  Resp: 20 (10/30/18 1950)  BP: (!) 164/76 (10/30/18 1950)  SpO2: 100 % (10/30/18 1657) Vital Signs (24h Range):  Temp:  [97.8 °F (36.6 °C)-99.4 °F (37.4 °C)] 99.4 °F (37.4 °C)  Pulse:  [67-97] 91  Resp:  [17-20] 20  SpO2:  [95 %-100 %] 100 %  BP: (118-183)/() 164/76     Weight: (!) 164.2 kg (362 lb)  Body mass index is 55.04 kg/m².  Patient's last menstrual period was 10/10/2018.    I&O (Last 24H):    Intake/Output Summary (Last 24 hours) at 10/30/2018 2235  Last data filed at 10/30/2018 1729  Gross per 24 hour   Intake 4110 ml   Output 800 ml   Net 3310 ml       Physical Exam:   Constitutional: She is oriented to person, place, and time. She appears well-developed and well-nourished.       Cardiovascular:   Mildly tachycardiac    Pulmonary/Chest: Effort normal and breath sounds normal.        Abdominal: Soft. Bowel sounds are normal. She exhibits abdominal incision. There is tenderness.                 Neurological: She is alert and oriented to  person, place, and time.    Skin: Skin is warm and dry.    Psychiatric: She has a normal mood and affect.       Laboratory:  BMP:   Recent Labs   Lab 10/30/18  0702   *   *   K 4.2      CO2 24   BUN 10   CREATININE 0.7   CALCIUM 8.0*     CBC:   Recent Labs   Lab 10/30/18  0702   WBC 7.96   RBC 3.14*   HGB 6.4*   HCT 22.2*      MCV 71*   MCH 20.4*   MCHC 28.8*

## 2018-11-01 VITALS
WEIGHT: 293 LBS | DIASTOLIC BLOOD PRESSURE: 58 MMHG | TEMPERATURE: 98 F | HEIGHT: 68 IN | HEART RATE: 81 BPM | BODY MASS INDEX: 44.41 KG/M2 | OXYGEN SATURATION: 100 % | RESPIRATION RATE: 20 BRPM | SYSTOLIC BLOOD PRESSURE: 130 MMHG

## 2018-11-01 PROCEDURE — 25000003 PHARM REV CODE 250: Performed by: OBSTETRICS & GYNECOLOGY

## 2018-11-01 PROCEDURE — 99232 SBSQ HOSP IP/OBS MODERATE 35: CPT | Mod: ,,, | Performed by: OBSTETRICS & GYNECOLOGY

## 2018-11-01 RX ORDER — IBUPROFEN 800 MG/1
800 TABLET ORAL EVERY 8 HOURS PRN
Qty: 40 TABLET | Refills: 0 | Status: SHIPPED | OUTPATIENT
Start: 2018-11-01 | End: 2018-11-06

## 2018-11-01 RX ORDER — HYDROCODONE BITARTRATE AND ACETAMINOPHEN 5; 325 MG/1; MG/1
1 TABLET ORAL EVERY 4 HOURS PRN
Qty: 30 TABLET | Refills: 0 | Status: SHIPPED | OUTPATIENT
Start: 2018-11-01 | End: 2018-12-11

## 2018-11-01 RX ORDER — METRONIDAZOLE 500 MG/1
500 TABLET ORAL EVERY 12 HOURS
Qty: 14 TABLET | Refills: 0 | Status: SHIPPED | OUTPATIENT
Start: 2018-11-01 | End: 2018-11-08

## 2018-11-01 RX ORDER — DOCUSATE SODIUM 100 MG/1
100 CAPSULE, LIQUID FILLED ORAL 2 TIMES DAILY
Qty: 60 CAPSULE | Refills: 2 | Status: SHIPPED | OUTPATIENT
Start: 2018-11-01 | End: 2018-12-11

## 2018-11-01 RX ORDER — HYDROCODONE BITARTRATE AND ACETAMINOPHEN 5; 325 MG/1; MG/1
1 TABLET ORAL EVERY 4 HOURS PRN
Qty: 30 TABLET | Refills: 0 | Status: SHIPPED | OUTPATIENT
Start: 2018-11-01 | End: 2018-11-01

## 2018-11-01 RX ADMIN — DOCUSATE SODIUM 100 MG: 100 CAPSULE, LIQUID FILLED ORAL at 08:11

## 2018-11-01 RX ADMIN — FAMOTIDINE 20 MG: 20 TABLET ORAL at 08:11

## 2018-11-01 RX ADMIN — LISINOPRIL 40 MG: 20 TABLET ORAL at 08:11

## 2018-11-01 RX ADMIN — Medication 1 CAPSULE: at 08:11

## 2018-11-01 RX ADMIN — MUPIROCIN 1 G: 20 OINTMENT TOPICAL at 08:11

## 2018-11-01 RX ADMIN — IBUPROFEN 600 MG: 600 TABLET ORAL at 05:11

## 2018-11-01 RX ADMIN — HYDROCODONE BITARTRATE AND ACETAMINOPHEN 1 TABLET: 5; 325 TABLET ORAL at 05:11

## 2018-11-01 RX ADMIN — ACETAZOLAMIDE 500 MG: 500 CAPSULE, EXTENDED RELEASE ORAL at 08:11

## 2018-11-01 RX ADMIN — HYDROCODONE BITARTRATE AND ACETAMINOPHEN 1 TABLET: 5; 325 TABLET ORAL at 10:11

## 2018-11-01 NOTE — ASSESSMENT & PLAN NOTE
- POD#3 s/p EVETTE/BS/Left oophorectomy  - Control pain with PO pain medication  - Voiding   - Passing flatus, bowel movement this morning  - Encourage Ambulation  - IS every hour while awake  - QUANG hose and Vern while in bed   - Tolerating a regular diet

## 2018-11-01 NOTE — PLAN OF CARE
Problem: Patient Care Overview  Goal: Plan of Care Review  Outcome: Ongoing (interventions implemented as appropriate)  Patient BP much improved today 133/68 and 113/54. VSS. Pain under control with Norco and Motrin. Incision open to air and clean without drainage. H&H improved after blood transfusion to 7.3/25.2.

## 2018-11-01 NOTE — SUBJECTIVE & OBJECTIVE
Interval History: Ms. Molina is doing well this morning. She reports that she had a bowel movement this morning which made her feel much better. She denies vaginal bleeding and reports that she was able to get sleep overnight. She reports that her pain is much improved and controlled with pain medication. She is ambulating, voiding, and tolerating PO.    Scheduled Meds:   acetaZOLAMIDE  500 mg Oral BID    docusate sodium  100 mg Oral BID    famotidine  20 mg Oral Daily    ibuprofen  600 mg Oral Q6H    iron polysaccharide complex, vit c-sa  1 capsule Oral Daily    lisinopril  40 mg Oral Daily    mupirocin  1 g Nasal BID    senna-docusate 8.6-50 mg  1 tablet Oral BID     Continuous Infusions:  PRN Meds:bisacodyl, butalbital-acetaminophen-caffeine -40 mg, diphenhydrAMINE, diphenhydrAMINE, HYDROcodone-acetaminophen, HYDROcodone-acetaminophen, labetalol, ondansetron, promethazine (PHENERGAN) IVPB    Review of patient's allergies indicates:   Allergen Reactions    Depo-provera contraceptive Blisters       Objective:     Vital Signs (Most Recent):  Temp: 98.3 °F (36.8 °C) (11/01/18 0748)  Pulse: 81 (11/01/18 0748)  Resp: 20 (11/01/18 0748)  BP: (!) 130/58 (11/01/18 0748)  SpO2: 100 % (10/30/18 1657) Vital Signs (24h Range):  Temp:  [97.2 °F (36.2 °C)-99.6 °F (37.6 °C)] 98.3 °F (36.8 °C)  Pulse:  [70-88] 81  Resp:  [14-20] 20  BP: (113-165)/(54-75) 130/58     Weight: (!) 164.2 kg (362 lb)  Body mass index is 55.04 kg/m².  Patient's last menstrual period was 10/10/2018.    I&O (Last 24H):    Intake/Output Summary (Last 24 hours) at 11/1/2018 0955  Last data filed at 10/31/2018 1630  Gross per 24 hour   Intake --   Output 950 ml   Net -950 ml       Physical Exam:   Constitutional: She is oriented to person, place, and time. She appears well-developed and well-nourished.       Cardiovascular: Normal rate.     Pulmonary/Chest: Effort normal.        Abdominal: Soft. She exhibits abdominal incision. There is  tenderness.   Appropriately tender                 Neurological: She is alert and oriented to person, place, and time.    Skin: Skin is warm and dry.    Psychiatric: She has a normal mood and affect.       Laboratory:  CBC:   Recent Labs   Lab 10/31/18  0644   WBC 8.43   RBC 3.50*   HGB 7.3*   HCT 25.2*      MCV 72*   MCH 20.9*   MCHC 29.0*

## 2018-11-01 NOTE — PROGRESS NOTES
[facility]  Obstetrics & Gynecology  Progress Note    Patient Name: Bijal Molina  MRN: [id number]  Admission Date: 10/29/2018  Primary Care Provider: Misbah Oshea MD  Principal Problem: S/P EVETTE (total abdominal hysterectomy)    Subjective:     HPI:  Ms. Piña is a 38 year old P3 who presents for scheduled EVETTE/BS 2/2 AUB. The patient complains of heavy menses. Her LMP was 09/19/2018 and bled for 6 days. She uses 3 pads at a time and changes pads every hour to 30 minutes without blood clots. She reports that she was told that she was anemic recently and believes that it is secondary to menses.         Interval History: Ms. Molina is doing well this morning. She reports that she had a bowel movement this morning which made her feel much better. She denies vaginal bleeding and reports that she was able to get sleep overnight. She reports that her pain is much improved and controlled with pain medication. She is ambulating, voiding, and tolerating PO.    Scheduled Meds:   acetaZOLAMIDE  500 mg Oral BID    docusate sodium  100 mg Oral BID    famotidine  20 mg Oral Daily    ibuprofen  600 mg Oral Q6H    iron polysaccharide complex, vit c-sa  1 capsule Oral Daily    lisinopril  40 mg Oral Daily    mupirocin  1 g Nasal BID    senna-docusate 8.6-50 mg  1 tablet Oral BID     Continuous Infusions:  PRN Meds:bisacodyl, butalbital-acetaminophen-caffeine -40 mg, diphenhydrAMINE, diphenhydrAMINE, HYDROcodone-acetaminophen, HYDROcodone-acetaminophen, labetalol, ondansetron, promethazine (PHENERGAN) IVPB    Review of patient's allergies indicates:   Allergen Reactions    Depo-provera contraceptive Blisters       Objective:     Vital Signs (Most Recent):  Temp: 98.3 °F (36.8 °C) (11/01/18 0748)  Pulse: 81 (11/01/18 0748)  Resp: 20 (11/01/18 0748)  BP: (!) 130/58 (11/01/18 0748)  SpO2: 100 % (10/30/18 1657) Vital Signs (24h Range):  Temp:  [97.2 °F (36.2 °C)-99.6 °F (37.6 °C)] 98.3 °F (36.8  °C)  Pulse:  [70-88] 81  Resp:  [14-20] 20  BP: (113-165)/(54-75) 130/58     Weight: (!) 164.2 kg (362 lb)  Body mass index is 55.04 kg/m².  Patient's last menstrual period was 10/10/2018.    I&O (Last 24H):    Intake/Output Summary (Last 24 hours) at 11/1/2018 0955  Last data filed at 10/31/2018 1630  Gross per 24 hour   Intake --   Output 950 ml   Net -950 ml       Physical Exam:   Constitutional: She is oriented to person, place, and time. She appears well-developed and well-nourished.       Cardiovascular: Normal rate.     Pulmonary/Chest: Effort normal.        Abdominal: Soft. She exhibits abdominal incision. There is tenderness.   Appropriately tender                 Neurological: She is alert and oriented to person, place, and time.    Skin: Skin is warm and dry.    Psychiatric: She has a normal mood and affect.       Laboratory:  CBC:   Recent Labs   Lab 10/31/18  0644   WBC 8.43   RBC 3.50*   HGB 7.3*   HCT 25.2*      MCV 72*   MCH 20.9*   MCHC 29.0*         Assessment/Plan:     * S/P EVETTE (total abdominal hysterectomy)/BS/left oophorectomy    - POD#3 s/p EVETTE/BS/Left oophorectomy  - Control pain with PO pain medication  - Voiding   - Passing flatus, bowel movement this morning  - Encourage Ambulation  - IS every hour while awake  - QUANG hose and SCDs while in bed   - Tolerating a regular diet     Essential hypertension    - Home ACE inhibitor  - Labetalol PRN     Symptomatic anemia    - H/H this morning 7.3/25.2; Appropriate increase  - s/p 1U PRBCs  - Continue iron and colace     Papilledema associated with increased intracranial pressure    - Continue Diamox and Fioricet         Margaret Alexis MD  Obstetrics & Gynecology  [facility]

## 2018-11-01 NOTE — NURSING
D/C instructions given to patient regarding follow up appts, medications to start taking and how to care for incision @ home. Pt in no distress, just complaints of drowsiness. Paper Rx given to patient per MD. Pt getting dressed. Pt in no distress, awaiting ride. Will cont to monitor.

## 2018-11-01 NOTE — DISCHARGE SUMMARY
[facility]  Obstetrics & Gynecology  Discharge Summary    Patient Name: Bijal Molina  MRN: [id number]  Admission Date: 10/29/2018  Hospital Length of Stay: 3 days  Discharge Date and Time:  11/01/2018 10:05 AM  Attending Physician: Margaret Alexis,*   Discharging Provider: Margaret Alexis MD  Primary Care Provider: Misbah Oshea MD    HPI:  Ms. Piña is a 38 year old P3 who presents for scheduled EVETTE/BS 2/2 AUB. The patient complains of heavy menses. Her LMP was 09/19/2018 and bled for 6 days. She uses 3 pads at a time and changes pads every hour to 30 minutes without blood clots. She reports that she was told that she was anemic recently and believes that it is secondary to menses.         Hospital Course:  10/30/2018 - POD#1 s/p EVETTE/BS/Left oophorectomy. Patient is ambulating, voiding, and tolerating PO. She was taken off of the PCA this morning and transitioned to PO pain medication. She complains of feeling dizzy when ambulating to the restroom with assistance this morning.   10/31/2018 - POD#2 s/p EVETTE/BS/Left oophorectomy. Patient is ambulating, voiding and tolerating PO. She is reports that she has not been able to sleep all night 2/2 urinary frequency. She also complains of pain at this time.   11/01/2018 - POD#3 s/p EVETTE/BS/Left oophorectomy. Patient is ambulating, voiding, and tolerating PO. She reports that pain much improved. She reports that she was able to sleep last night. She also had a bowel movement this morning which she reports made her feel much better.     Procedure(s) (LRB):  HYSTERECTOMY, TOTAL, ABDOMINAL (N/A)  SALPINGECTOMY (Bilateral)         Significant Diagnostic Studies: Labs:   CBC   Recent Labs   Lab 10/31/18  0644   WBC 8.43   HGB 7.3*   HCT 25.2*          Pending Diagnostic Studies:     None        Final Active Diagnoses:    Diagnosis Date Noted POA    PRINCIPAL PROBLEM:  S/P EVETTE (total abdominal hysterectomy)/BS/left oophorectomy  [Z90.710] 10/29/2018 No    Symptomatic anemia [D64.9] 10/30/2018 No    Essential hypertension [I10] 10/30/2018 Yes    Abnormal uterine bleeding (AUB) [N93.9] 10/22/2018 Yes    Papilledema associated with increased intracranial pressure [H47.11] 10/03/2018 Yes      Problems Resolved During this Admission:        Discharged Condition: good    Disposition: Home or Self Care    Follow Up:  Follow-up Information     Margaret Alexis MD On 12/10/2018.    Specialty:  Obstetrics and Gynecology  Why:  post-op check  Contact information:  [address]  [address] [place] [id number]  [phone]                 Patient Instructions:      Diet Adult Regular     Lifting restrictions   Order Comments: No lifting anything greater than 5 lbs until after postop visit     No driving until:   Order Comments: No driving until you can slam on brake with no pain your abdomen and you are no longer on narcotics.     Notify your health care provider if you experience any of the following:  temperature >100.4     Notify your health care provider if you experience any of the following:  persistent nausea and vomiting or diarrhea     Notify your health care provider if you experience any of the following:  severe uncontrolled pain     Notify your health care provider if you experience any of the following:  redness, tenderness, or signs of infection (pain, swelling, redness, odor or green/yellow discharge around incision site)     Notify your health care provider if you experience any of the following:  difficulty breathing or increased cough     Notify your health care provider if you experience any of the following:  severe persistent headache     Notify your health care provider if you experience any of the following:  worsening rash     Notify your health care provider if you experience any of the following:  persistent dizziness, light-headedness, or visual disturbances     Notify your health care provider if you experience  any of the following:  increased confusion or weakness     No dressing needed   Order Comments: Keep incision clean and dry     Other restrictions (specify):   Order Comments: Remain on pelvic rest x 6 weeks.  No Tampons, douching or intercourse during that time.     Activity as tolerated     Medications:  Reconciled Home Medications:      Medication List      START taking these medications    docusate sodium 100 MG capsule  Commonly known as:  COLACE  Take 1 capsule (100 mg total) by mouth 2 (two) times daily.     HYDROcodone-acetaminophen 5-325 mg per tablet  Commonly known as:  NORCO  Take 1 tablet by mouth every 4 (four) hours as needed.     ibuprofen 800 MG tablet  Commonly known as:  ADVIL,MOTRIN  Take 1 tablet (800 mg total) by mouth every 8 (eight) hours as needed for Pain.     iron polysaccharide complex, vit c-sa 150-50-50 mg Cap capsule  Commonly known as:  ferrex 150 plus  Take 1 capsule by mouth once daily.  Start taking on:  11/2/2018        CONTINUE taking these medications    acetaZOLAMIDE 500 mg Cpsr  Commonly known as:  DIAMOX  Take 1 capsule (500 mg total) by mouth 2 (two) times daily.     butalbital-acetaminophen-caffeine -40 mg -40 mg per tablet  Commonly known as:  FIORICET, ESGIC  Take 1 tablet by mouth every 4 (four) hours as needed for Pain.     lisinopril 40 MG tablet  Commonly known as:  PRINIVIL,ZESTRIL  Take 40 mg by mouth once daily.     metroNIDAZOLE 500 MG tablet  Commonly known as:  FLAGYL  Take 1 tablet (500 mg total) by mouth every 12 (twelve) hours. for 7 days        STOP taking these medications    IRON (DRIED) ORAL            Margaret Alexis MD  Obstetrics & Gynecology  [facility]

## 2018-11-02 NOTE — ANESTHESIA POSTPROCEDURE EVALUATION
"Anesthesia Post Evaluation    Patient: Bijal Molina    Procedure(s) Performed: Procedure(s) (LRB):  HYSTERECTOMY, TOTAL, ABDOMINAL (N/A)  SALPINGECTOMY (Bilateral)    Final Anesthesia Type: general  Patient location during evaluation: PACU  Patient participation: Yes- Able to Participate  Level of consciousness: awake and alert  Post-procedure vital signs: reviewed and stable  Pain management: adequate  Airway patency: patent  PONV status at discharge: No PONV  Anesthetic complications: no      Cardiovascular status: blood pressure returned to baseline and hemodynamically stable  Respiratory status: unassisted and spontaneous ventilation  Hydration status: euvolemic  Follow-up not needed.        Visit Vitals  BP (!) 130/58 (BP Location: Right arm, Patient Position: Lying)   Pulse 81   Temp 36.8 °C (98.3 °F) (Oral)   Resp 20   Ht 5' 8" (1.727 m)   Wt (!) 164.2 kg (362 lb)   LMP 10/10/2018   SpO2 100%   Breastfeeding? No   BMI 55.04 kg/m²       Pain/Candida Score: Pain Assessment Performed: Yes (11/1/2018 10:00 AM)  Presence of Pain: complains of pain/discomfort (11/1/2018 10:00 AM)  Pain Rating Prior to Med Admin: 8 (11/1/2018 10:27 AM)  Pain Rating Post Med Admin: 5 (11/1/2018  6:00 AM)        "

## 2018-11-05 NOTE — PHYSICIAN QUERY
PT Name: Bijal Molina  MR #: [id number]    Physician Query Form - Pathology Findings Clarification     CDS/: ELBERT Peraza,[email or web address]            Contact information:[email or web address]  This form is a permanent document in the medical record.     Query Date: November 5, 2018      By submitting this query, we are merely seeking further clarification of documentation.  Please utilize your independent clinical judgment when addressing the question(s) below.      The medical record contains the following:     Findings Supporting Clinical Information Location in Medical Record   FINAL PATHOLOGIC DIAGNOSIS  Uterus and cervix (834 g):  -Multiple leiomyomata  -Early proliferative endometrium. Glands appear free of hyperplasia and atypia.  -Squamous metaplasia and Nabothian cysts of the cervix  Ovary (1, submitted as left):  -Multiple cystic follicles  -Fibrosis and distortion with extensive recent hemorrhage, suggestive of adhesion  Fallopian tubes (2):  -No intrinsic histopathologic abnormalities  -Serosal fibrosis of one tube suggestive of adhesion  -Microscopic hydatid cysts                                 PREOPERATIVE DIAGNOSES:  1. Abnormal Uterine Bleeding - Leiomyoma  2. Enlarged Uterus  3. Morbid Obesity  4. Hypertension     POSTOPERATIVE DIAGNOSIS:    1.  Same     PROCEDURES PERFORMED:  1. Exploratory laparotomy.  2. Total abdominal hysterectomy  3. Bilateral Salpingectomy  4. Left Oophorectomy Pathology report 10/29                            Op note 10/29     Please document the clinical significance of the Pathologists findings of   -Multiple leiomyomata  -Early proliferative endometrium. Glands appear free of hyperplasia and atypia.  -Squamous metaplasia and Nabothian cysts of the cervix  Ovary (1, submitted as left):  -Multiple cystic follicles  -Fibrosis and distortion with extensive recent hemorrhage, suggestive of adhesion  Fallopian tubes (2):  -No intrinsic histopathologic  abnormalities  -Serosal fibrosis of one tube suggestive of adhesion  -Microscopic hydatid cysts.          [x] I agree with the Pathology Findings        [  ] I do not agree with the Pathology Findings        [  ] Clinically Insignificant        [  ] Clinically Undetermined        [  ] Other/Clarification of Findings: ______________________________________________    Please document in your progress notes daily for the duration of treatment until resolved and include in your discharge summary.

## 2018-11-06 ENCOUNTER — CLINICAL SUPPORT (OUTPATIENT)
Dept: OPHTHALMOLOGY | Facility: CLINIC | Age: 38
DRG: 065 | End: 2018-11-06
Payer: COMMERCIAL

## 2018-11-06 ENCOUNTER — OFFICE VISIT (OUTPATIENT)
Dept: OPHTHALMOLOGY | Facility: CLINIC | Age: 38
DRG: 065 | End: 2018-11-06
Payer: COMMERCIAL

## 2018-11-06 ENCOUNTER — HOSPITAL ENCOUNTER (INPATIENT)
Facility: HOSPITAL | Age: 38
LOS: 2 days | Discharge: HOME | DRG: 065 | End: 2018-11-08
Attending: EMERGENCY MEDICINE | Admitting: PSYCHIATRY & NEUROLOGY
Payer: COMMERCIAL

## 2018-11-06 ENCOUNTER — TELEPHONE (OUTPATIENT)
Dept: OPHTHALMOLOGY | Facility: CLINIC | Age: 38
End: 2018-11-06

## 2018-11-06 DIAGNOSIS — H53.461 RIGHT HOMONYMOUS HEMIANOPSIA: ICD-10-CM

## 2018-11-06 DIAGNOSIS — H47.11 PAPILLEDEMA ASSOCIATED WITH INCREASED INTRACRANIAL PRESSURE: Primary | ICD-10-CM

## 2018-11-06 DIAGNOSIS — H53.461 RIGHT HOMONYMOUS HEMIANOPSIA: Primary | ICD-10-CM

## 2018-11-06 DIAGNOSIS — I63.9 STROKE: ICD-10-CM

## 2018-11-06 LAB
ALBUMIN SERPL BCP-MCNC: 3.1 G/DL
ALP SERPL-CCNC: 94 U/L
ALT SERPL W/O P-5'-P-CCNC: 14 U/L
ANION GAP SERPL CALC-SCNC: 6 MMOL/L
AST SERPL-CCNC: 13 U/L
BASOPHILS # BLD AUTO: 0.05 K/UL
BASOPHILS NFR BLD: 0.5 %
BILIRUB SERPL-MCNC: 0.1 MG/DL
BILIRUB UR QL STRIP: NEGATIVE
BUN SERPL-MCNC: 10 MG/DL
CALCIUM SERPL-MCNC: 9 MG/DL
CHLORIDE SERPL-SCNC: 108 MMOL/L
CHOLEST SERPL-MCNC: 150 MG/DL
CHOLEST/HDLC SERPL: 4.2 {RATIO}
CLARITY UR REFRACT.AUTO: CLEAR
CO2 SERPL-SCNC: 24 MMOL/L
COLOR UR AUTO: YELLOW
CREAT SERPL-MCNC: 0.7 MG/DL
DIFFERENTIAL METHOD: ABNORMAL
EOSINOPHIL # BLD AUTO: 0.4 K/UL
EOSINOPHIL NFR BLD: 4.2 %
ERYTHROCYTE [DISTWIDTH] IN BLOOD BY AUTOMATED COUNT: 18.5 %
EST. GFR  (AFRICAN AMERICAN): >60 ML/MIN/1.73 M^2
EST. GFR  (NON AFRICAN AMERICAN): >60 ML/MIN/1.73 M^2
GLUCOSE SERPL-MCNC: 92 MG/DL
GLUCOSE UR QL STRIP: NEGATIVE
HCT VFR BLD AUTO: 28.8 %
HDLC SERPL-MCNC: 36 MG/DL
HDLC SERPL: 24 %
HGB BLD-MCNC: 8.2 G/DL
HGB UR QL STRIP: NEGATIVE
IMM GRANULOCYTES # BLD AUTO: 0.07 K/UL
IMM GRANULOCYTES NFR BLD AUTO: 0.7 %
INR PPP: 1
KETONES UR QL STRIP: NEGATIVE
LDLC SERPL CALC-MCNC: 84 MG/DL
LEUKOCYTE ESTERASE UR QL STRIP: NEGATIVE
LYMPHOCYTES # BLD AUTO: 2.1 K/UL
LYMPHOCYTES NFR BLD: 21.8 %
MCH RBC QN AUTO: 20.4 PG
MCHC RBC AUTO-ENTMCNC: 28.5 G/DL
MCV RBC AUTO: 72 FL
MONOCYTES # BLD AUTO: 0.6 K/UL
MONOCYTES NFR BLD: 6.6 %
NEUTROPHILS # BLD AUTO: 6.5 K/UL
NEUTROPHILS NFR BLD: 66.2 %
NITRITE UR QL STRIP: NEGATIVE
NONHDLC SERPL-MCNC: 114 MG/DL
NRBC BLD-RTO: 0 /100 WBC
PH UR STRIP: 8 [PH] (ref 5–8)
PLATELET # BLD AUTO: 458 K/UL
PMV BLD AUTO: 10.3 FL
POTASSIUM SERPL-SCNC: 3.7 MMOL/L
PROT SERPL-MCNC: 7.5 G/DL
PROT UR QL STRIP: NEGATIVE
PROTHROMBIN TIME: 10.6 SEC
RBC # BLD AUTO: 4.01 M/UL
SODIUM SERPL-SCNC: 138 MMOL/L
SP GR UR STRIP: 1.01 (ref 1–1.03)
TRIGL SERPL-MCNC: 150 MG/DL
TSH SERPL DL<=0.005 MIU/L-ACNC: 0.84 UIU/ML
URN SPEC COLLECT METH UR: NORMAL
WBC # BLD AUTO: 9.77 K/UL

## 2018-11-06 PROCEDURE — 99285 EMERGENCY DEPT VISIT HI MDM: CPT | Mod: 25

## 2018-11-06 PROCEDURE — 93010 ELECTROCARDIOGRAM REPORT: CPT | Mod: ,,, | Performed by: INTERNAL MEDICINE

## 2018-11-06 PROCEDURE — 84443 ASSAY THYROID STIM HORMONE: CPT

## 2018-11-06 PROCEDURE — 63600175 PHARM REV CODE 636 W HCPCS: Performed by: EMERGENCY MEDICINE

## 2018-11-06 PROCEDURE — 80053 COMPREHEN METABOLIC PANEL: CPT

## 2018-11-06 PROCEDURE — 85025 COMPLETE CBC W/AUTO DIFF WBC: CPT

## 2018-11-06 PROCEDURE — 81003 URINALYSIS AUTO W/O SCOPE: CPT

## 2018-11-06 PROCEDURE — 85610 PROTHROMBIN TIME: CPT

## 2018-11-06 PROCEDURE — 25000003 PHARM REV CODE 250: Performed by: NURSE PRACTITIONER

## 2018-11-06 PROCEDURE — 99999 PR PBB SHADOW E&M-EST. PATIENT-LVL III: CPT | Mod: PBBFAC,,, | Performed by: OPHTHALMOLOGY

## 2018-11-06 PROCEDURE — 96374 THER/PROPH/DIAG INJ IV PUSH: CPT

## 2018-11-06 PROCEDURE — 93005 ELECTROCARDIOGRAM TRACING: CPT

## 2018-11-06 PROCEDURE — 25000003 PHARM REV CODE 250: Performed by: PHYSICIAN ASSISTANT

## 2018-11-06 PROCEDURE — 92012 INTRM OPH EXAM EST PATIENT: CPT | Mod: S$GLB,,, | Performed by: OPHTHALMOLOGY

## 2018-11-06 PROCEDURE — 20600001 HC STEP DOWN PRIVATE ROOM

## 2018-11-06 PROCEDURE — 92083 EXTENDED VISUAL FIELD XM: CPT | Mod: S$GLB,,, | Performed by: OPHTHALMOLOGY

## 2018-11-06 PROCEDURE — 25000003 PHARM REV CODE 250: Performed by: EMERGENCY MEDICINE

## 2018-11-06 PROCEDURE — 80061 LIPID PANEL: CPT

## 2018-11-06 PROCEDURE — 99285 EMERGENCY DEPT VISIT HI MDM: CPT | Mod: ,,, | Performed by: EMERGENCY MEDICINE

## 2018-11-06 RX ORDER — HYDROCODONE BITARTRATE AND ACETAMINOPHEN 5; 325 MG/1; MG/1
1 TABLET ORAL EVERY 4 HOURS PRN
Status: DISCONTINUED | OUTPATIENT
Start: 2018-11-06 | End: 2018-11-08 | Stop reason: HOSPADM

## 2018-11-06 RX ORDER — IRON POLYSACCHARIDE COMPLEX 150 MG
1 CAPSULE ORAL DAILY
Status: DISCONTINUED | OUTPATIENT
Start: 2018-11-07 | End: 2018-11-08 | Stop reason: HOSPADM

## 2018-11-06 RX ORDER — PROCHLORPERAZINE EDISYLATE 5 MG/ML
10 INJECTION INTRAMUSCULAR; INTRAVENOUS ONCE
Status: DISCONTINUED | OUTPATIENT
Start: 2018-11-06 | End: 2018-11-08 | Stop reason: HOSPADM

## 2018-11-06 RX ORDER — ACETAZOLAMIDE 500 MG/1
500 CAPSULE, EXTENDED RELEASE ORAL 2 TIMES DAILY
Status: DISCONTINUED | OUTPATIENT
Start: 2018-11-06 | End: 2018-11-08 | Stop reason: HOSPADM

## 2018-11-06 RX ORDER — MORPHINE SULFATE 4 MG/ML
8 INJECTION, SOLUTION INTRAMUSCULAR; INTRAVENOUS
Status: COMPLETED | OUTPATIENT
Start: 2018-11-06 | End: 2018-11-06

## 2018-11-06 RX ORDER — ASPIRIN 325 MG
325 TABLET ORAL DAILY
Status: DISCONTINUED | OUTPATIENT
Start: 2018-11-06 | End: 2018-11-08 | Stop reason: HOSPADM

## 2018-11-06 RX ORDER — SODIUM CHLORIDE 0.9 % (FLUSH) 0.9 %
3 SYRINGE (ML) INJECTION EVERY 8 HOURS
Status: DISCONTINUED | OUTPATIENT
Start: 2018-11-06 | End: 2018-11-08 | Stop reason: HOSPADM

## 2018-11-06 RX ORDER — METRONIDAZOLE 500 MG/1
500 TABLET ORAL EVERY 12 HOURS
Status: DISPENSED | OUTPATIENT
Start: 2018-11-06 | End: 2018-11-08

## 2018-11-06 RX ORDER — DOCUSATE SODIUM 100 MG/1
100 CAPSULE, LIQUID FILLED ORAL 2 TIMES DAILY
Status: DISCONTINUED | OUTPATIENT
Start: 2018-11-06 | End: 2018-11-08 | Stop reason: HOSPADM

## 2018-11-06 RX ORDER — LISINOPRIL 20 MG/1
40 TABLET ORAL DAILY
Status: DISCONTINUED | OUTPATIENT
Start: 2018-11-07 | End: 2018-11-08 | Stop reason: HOSPADM

## 2018-11-06 RX ORDER — HYDRALAZINE HYDROCHLORIDE 20 MG/ML
10 INJECTION INTRAMUSCULAR; INTRAVENOUS EVERY 6 HOURS PRN
Status: DISCONTINUED | OUTPATIENT
Start: 2018-11-06 | End: 2018-11-08 | Stop reason: HOSPADM

## 2018-11-06 RX ORDER — CLOPIDOGREL BISULFATE 75 MG/1
75 TABLET ORAL DAILY
Status: DISCONTINUED | OUTPATIENT
Start: 2018-11-06 | End: 2018-11-08 | Stop reason: HOSPADM

## 2018-11-06 RX ORDER — LABETALOL HYDROCHLORIDE 5 MG/ML
10 INJECTION, SOLUTION INTRAVENOUS
Status: DISCONTINUED | OUTPATIENT
Start: 2018-11-06 | End: 2018-11-06

## 2018-11-06 RX ADMIN — HYDROCODONE BITARTRATE AND ACETAMINOPHEN 1 TABLET: 5; 325 TABLET ORAL at 10:11

## 2018-11-06 RX ADMIN — CLOPIDOGREL 75 MG: 75 TABLET, FILM COATED ORAL at 10:11

## 2018-11-06 RX ADMIN — METRONIDAZOLE 500 MG: 500 TABLET ORAL at 10:11

## 2018-11-06 RX ADMIN — DOCUSATE SODIUM 100 MG: 100 CAPSULE, LIQUID FILLED ORAL at 10:11

## 2018-11-06 RX ADMIN — MORPHINE SULFATE 8 MG: 4 INJECTION, SOLUTION INTRAMUSCULAR; INTRAVENOUS at 05:11

## 2018-11-06 RX ADMIN — SODIUM CHLORIDE 250 ML: 0.9 INJECTION, SOLUTION INTRAVENOUS at 06:11

## 2018-11-06 RX ADMIN — ASPIRIN 325 MG ORAL TABLET 325 MG: 325 PILL ORAL at 10:11

## 2018-11-06 RX ADMIN — ACETAZOLAMIDE 500 MG: 500 CAPSULE, EXTENDED RELEASE ORAL at 11:11

## 2018-11-06 NOTE — PROGRESS NOTES
HPI     DLS:10/03/2018 Marii  Patient here for 1 month follow up and Review HVF.  Pt states seeing floaters and flashes since her Hysterectomy   surg.(10/29/2018)  OU vision blurry.   9 on pain scale. Daily headaches.    I have personally interviewed the patient, reviewed the history and   examined the patient and agree with the technician's exam.    Last edited by Dane Colvin MD on 11/6/2018  3:10 PM. (History)            Assessment /Plan     For exam results, see Encounter Report.    Papilledema associated with increased intracranial pressure  -     Rasmussen Visual Field - OU - Extended - Both Eyes    Right homonymous hemianopsia  -     Rasmussen Visual Field - OU - Extended - Both Eyes      Ms. Molina has a new right homonymous hemianopia that developed about the time of her hysterectomy.  I spoke with Dr. Mercedes in the ED regarding a possible stroke happening about the time of the hysterectomy. He instructed me to have Ms. Molina go to the ED immediately for imaging and management.

## 2018-11-06 NOTE — ED PROVIDER NOTES
"Encounter Date: 2018    SCRIBE #1 NOTE: I, Bhavnajacob Chambers, am scribing for, and in the presence of, Jefry Machado MD.       History     Chief Complaint   Patient presents with    Loss of Vision     sent from ophthal clinic, see note, loss of vision to periph both eyes, sent here for ct     Ms. Molina, with a history of HTN and recent hysterectomy, presents to the ED with complaints of loss of vision. Tearfully, patient states that since her hysterectomy 8 days ago, she has not been able to see correctly out of both eyes and sees "spots." She endorses chest pain prior to surgery and abdominal pain as well. Patient denies any visual problems prior to surgery or fever. Fiance of patient denies her having any problems speaking as well. She reports that she was referred to the ED by her ophthalmologist to have a CT scan. Has some numbness of RUE and RLE, mostly in hand and foot, which has been present a long time. Currently on Diamox for increased ICP. Ophthalmology report with right sided hemianopsia. Denies fevers, chills, n/v, chest pain, SOB. Abd wound has had no drainage and abd hurts around incision.       The history is provided by the patient and medical records.     Review of patient's allergies indicates:   Allergen Reactions    Depo-provera contraceptive Blisters     Past Medical History:   Diagnosis Date    Anemia     Elevated intracranial pressure     Hypertension     Migraines     Obesity     Obesity      Past Surgical History:   Procedure Laterality Date     SECTION, LOW TRANSVERSE      x3    HYSTERECTOMY, TOTAL, ABDOMINAL N/A 10/29/2018    Performed by Margaret Alexis MD at [facility] OR    SALPINGECTOMY Bilateral 10/29/2018    Procedure: SALPINGECTOMY;  Surgeon: Margaret Alexis MD;  Location: [facility] OR;  Service: OB/GYN;  Laterality: Bilateral;    SALPINGECTOMY Bilateral 10/29/2018    Performed by Margaret [facility]is [facility] at [facility] OR    TOTAL ABDOMINAL " "HYSTERECTOMY N/A 10/29/2018    Procedure: HYSTERECTOMY, TOTAL, ABDOMINAL;  Surgeon: Margaret Alexis MD;  Location: [facility] OR;  Service: OB/GYN;  Laterality: N/A;  RN PREOP 10/22/2018-----BMI--55.2---  T&M FOR 2UNITS ORDERED----UPT    TUBAL LIGATION  2004     Family History   Problem Relation Age of Onset    Breast cancer Mother     Breast cancer Maternal Aunt     Breast cancer Maternal Aunt      Social History     Tobacco Use    Smoking status: Current Every Day Smoker    Smokeless tobacco: Never Used   Substance Use Topics    Alcohol use: Yes     Frequency: Monthly or less     Comment: social    Drug use: Yes     Types: Marijuana     Comment: very rare      Review of Systems   Constitutional: Negative for fever.   HENT: Negative for sore throat.    Eyes: Positive for visual disturbance ("seeing spots" and can't see to the right side ). Negative for photophobia, pain, discharge, redness and itching.   Respiratory: Negative for shortness of breath.    Cardiovascular: Positive for chest pain (since 2 weeks prior to surgery).   Gastrointestinal: Positive for abdominal pain (due to hysterectomy.). Negative for anal bleeding, constipation, diarrhea, nausea and vomiting.   Genitourinary: Negative for flank pain.   Musculoskeletal: Negative for neck pain.   Skin: Negative for rash.   Neurological: Positive for numbness and headaches. Negative for dizziness, speech difficulty, weakness and light-headedness.   Psychiatric/Behavioral: Negative for confusion.   All other systems reviewed and are negative.      Physical Exam     Initial Vitals [11/06/18 1603]   BP Pulse Resp Temp SpO2   (!) 172/86 85 18 98.7 °F (37.1 °C) 99 %      MAP       --         Physical Exam    Nursing note and vitals reviewed.  Constitutional: She appears well-developed and well-nourished. She is not diaphoretic. No distress.   HENT:   Head: Normocephalic and atraumatic.   Right Ear: External ear normal.   Left Ear: External ear normal. "   Nose: Nose normal.   Mouth/Throat: Oropharynx is clear and moist.   Eyes: EOM are normal. Pupils are equal, round, and reactive to light.   No nystagmus. Right lower quadrantanopsia present bilaterally. Appears to have some vision in RUQ fields. L fields normal.   Neck: Neck supple.   Cardiovascular: Normal rate, regular rhythm, normal heart sounds and intact distal pulses.   Pulmonary/Chest: Breath sounds normal. No respiratory distress. She has no wheezes. She has no rhonchi. She has no rales.   Abdominal: Soft. She exhibits no distension. There is no tenderness.   Neurological: She is alert and oriented to person, place, and time. No cranial nerve deficit or sensory deficit. GCS score is 15. GCS eye subscore is 4. GCS verbal subscore is 5. GCS motor subscore is 6.   4+/5 strength RUE and RLE. Normal strength on left.   Skin: Skin is warm. Capillary refill takes less than 2 seconds. No rash noted.   Psychiatric: She has a normal mood and affect.   Tearful.         ED Course   Procedures  Labs Reviewed   CBC W/ AUTO DIFFERENTIAL - Abnormal; Notable for the following components:       Result Value    Hemoglobin 8.2 (*)     Hematocrit 28.8 (*)     MCV 72 (*)     MCH 20.4 (*)     MCHC 28.5 (*)     RDW 18.5 (*)     Platelets 458 (*)     Immature Granulocytes 0.7 (*)     Immature Grans (Abs) 0.07 (*)     All other components within normal limits   COMPREHENSIVE METABOLIC PANEL - Abnormal; Notable for the following components:    Albumin 3.1 (*)     Anion Gap 6 (*)     All other components within normal limits   LIPID PANEL - Abnormal; Notable for the following components:    HDL 36 (*)     All other components within normal limits    Narrative:     ADD ON LIPID PANEL [id number] PER DR. CHRISTIE 11/06/2018  19:59    PROTIME-INR   TSH   URINALYSIS    Narrative:     If patient is unable to provide a clean catch specimen due to  impairments such as mobility or cognition, obtain straight  cath specimen.   LIPID PANEL     EKG  Readings: (Independently Interpreted)   Initial Reading: No STEMI. Rhythm: Normal Sinus Rhythm. Heart Rate: 70 bpm.   Normal ST intervals. No acute ischemic changes.        Imaging Results          X-Ray Chest AP Portable (Final result)  Result time 11/06/18 22:40:36    Final result by Sean Wetzel MD (11/06/18 22:40:36)                 Impression:      Bilateral perihilar edema.      Electronically signed by: Sean Wetzel MD  Date:    11/06/2018  Time:    22:40             Narrative:    EXAMINATION:  XR CHEST AP PORTABLE    CLINICAL HISTORY:  Stroke;    TECHNIQUE:  Single frontal view of the chest was performed.    COMPARISON:  May 19, 2017.    FINDINGS:  Bilateral perihilar edema.    No consolidation, pleural effusion or pneumothorax.    Cardiomediastinal silhouette is stable.                               MRI Brain Without Contrast (Final result)  Result time 11/06/18 20:41:49    Final result by Sean Wetzel MD (11/06/18 20:41:49)                 Impression:      Interval development of increased T2/FLAIR signal in the left occipital lobe since the prior examination. No associated mass effect.  There is T2 shine through signal on the diffusion sequence in this region without ADC match.  Finding represents nonspecific edema of uncertain etiology.    Remainder of the exam appears unchanged from prior.      Electronically signed by: Sean Wetzel MD  Date:    11/06/2018  Time:    20:41             Narrative:    EXAMINATION:  MRI BRAIN WITHOUT CONTRAST    CLINICAL HISTORY:  stroke right lower field deficits;.  Cerebral infarction, unspecified    TECHNIQUE:  Multiplanar multisequence MR imaging of the brain was performed without contrast.    COMPARISON:  October 17, 2018.    FINDINGS:  Intracranial compartment:    Ventricles and sulci are normal in size for age without evidence of hydrocephalus. No extra-axial blood or fluid collections.    Interval development of increased T2/FLAIR signal in the left  occipital lobe since the prior examination.  No associated mass effect.  There is T2 shine through signal on the diffusion sequence in this region without ADC match.  No mass lesion, acute hemorrhage or acute infarct.  A few scattered T2/FLAIR hyperintense supratentorial small white matter signal foci appear unchanged.    Incidental right parietal small developmental venous anomaly appears unchanged.  Empty sella configuration appears unchanged.    Normal vascular flow voids are preserved.    Skull/extracranial contents (limited evaluation): Bone marrow signal intensity is normal.                               CT Head Without Contrast (Final result)  Result time 11/06/18 19:50:28    Final result by Sean Wetzel MD (11/06/18 19:50:28)                 Impression:      No acute intracranial abnormalities    Empty sella configuration, unchanged from MRI.      Electronically signed by: Sean Wetzel MD  Date:    11/06/2018  Time:    19:50             Narrative:    EXAMINATION:  CT HEAD WITHOUT CONTRAST    CLINICAL HISTORY:  RUE, RLE weakness w/ RL quandrantiopsia;    TECHNIQUE:  Low dose axial images were obtained through the head.  Coronal and sagittal reformations were also performed. Contrast was not administered.    COMPARISON:  MRI brain October 17, 2018.    FINDINGS:  The brain parenchyma appears normal for age with good corticomedullary differentiation.  Empty sella configuration, unchanged from MRI.  There is no evidence of acute infarct, hemorrhage, or mass.  The ventricular system is normal in size.  No mass-effect or midline shift.  There are no abnormal extra-axial fluid collections.  The paranasal sinuses and mastoid air cells are clear.  The calvarium appears intact.  .                                 Medical Decision Making:   History:   Old Medical Records: I decided to obtain old medical records.  Old Records Summarized: records from clinic visits.  Independently Interpreted Test(s):   I have  ordered and independently interpreted EKG Reading(s) - see prior notes  Clinical Tests:   Lab Tests: Ordered and Reviewed  Radiological Study: Ordered and Reviewed  Medical Tests: Ordered and Reviewed  ED Management:  39 y/o female w/ focal field deficit. Vitals normal. Afebrile. 1 week post op; abd exam grossly benign; no signs of infection. However, symptoms present x 1 week. Concerning for possible stroke, parietal vs occipital, during surgery. Stroke labs, CT head and MR ordered. Vascular surgery consulted. Labs unremarkable. Vascular surgery to admit for further work up of possible stroke vs complications with idiopathic intracranial hypertension.   Other:   I have discussed this case with another health care provider.            Scribe Attestation:   Scribe #1: I performed the above scribed service and the documentation accurately describes the services I performed. I attest to the accuracy of the note.               Clinical Impression:   The encounter diagnosis was Stroke.      Disposition:   Disposition: Admitted  Condition: Stable                        Jefry Machado MD  11/07/18 1056       Jefry Machado MD  11/07/18 1053

## 2018-11-06 NOTE — TELEPHONE ENCOUNTER
----- Message from Franchesca Sierra sent at 11/6/2018  4:42 PM CST -----  Contact: Bijal Molina would like for you to contact her. She can be reached at [phone]. I couldn't understand what it is regards to.

## 2018-11-06 NOTE — ED TRIAGE NOTES
Pt. Presents to ED today with spouse from optho clinic with c/o blurred vision in bilateral eyes, worse on the right side x3 days. Pt. Also c/o headache x1 day. Sent for CT scan and neuro consult. Pt states pain 10/10 in abd secondary to hysterectomy on the 29th. Denies ams, motor loss, other complaints. Pt. Agitated, angry, states not wanting to be in the ER right now.

## 2018-11-06 NOTE — TELEPHONE ENCOUNTER
I returned Mrs. Molina's call.  She was upset that she was still waiting in the ED for imaging and she was under the impression that she would be seen immediately.  I advised that I needed to talk to Dr. Colvin to find out the reason for the referral to ED.  When I returned the call again it went straight to voice mail.  Tried calling several times without an answer.

## 2018-11-07 ENCOUNTER — PATIENT MESSAGE (OUTPATIENT)
Dept: OBSTETRICS AND GYNECOLOGY | Facility: CLINIC | Age: 38
End: 2018-11-07

## 2018-11-07 ENCOUNTER — TELEPHONE (OUTPATIENT)
Dept: OBSTETRICS AND GYNECOLOGY | Facility: CLINIC | Age: 38
End: 2018-11-07

## 2018-11-07 PROBLEM — E66.01 CLASS 3 SEVERE OBESITY DUE TO EXCESS CALORIES WITH SERIOUS COMORBIDITY AND BODY MASS INDEX (BMI) OF 50.0 TO 59.9 IN ADULT: Status: ACTIVE | Noted: 2018-11-07

## 2018-11-07 LAB
ALBUMIN SERPL BCP-MCNC: 2.8 G/DL
ALP SERPL-CCNC: 95 U/L
ALT SERPL W/O P-5'-P-CCNC: 10 U/L
ANION GAP SERPL CALC-SCNC: 8 MMOL/L
APTT BLDCRRT: 21.9 SEC
ASCENDING AORTA: 2.88 CM
AST SERPL-CCNC: 14 U/L
AV MEAN GRADIENT: 6.87 MMHG
AV PEAK GRADIENT: 9.61 MMHG
AV VALVE AREA: 2.78 CM2
BASOPHILS # BLD AUTO: 0.04 K/UL
BASOPHILS NFR BLD: 0.5 %
BILIRUB SERPL-MCNC: 0.2 MG/DL
BSA FOR ECHO PROCEDURE: 2.79 M2
BUN SERPL-MCNC: 14 MG/DL
CALCIUM SERPL-MCNC: 8.6 MG/DL
CHLORIDE SERPL-SCNC: 108 MMOL/L
CK MB SERPL-MCNC: 0.3 NG/ML
CK MB SERPL-RTO: 0.8 %
CK SERPL-CCNC: 40 U/L
CO2 SERPL-SCNC: 23 MMOL/L
CREAT SERPL-MCNC: 0.7 MG/DL
CV ECHO LV RWT: 0.42 CM
DIFFERENTIAL METHOD: ABNORMAL
DOP CALC AO PEAK VEL: 1.55 M/S
DOP CALC AO VTI: 28.24 CM
DOP CALC LVOT AREA: 3.83 CM2
DOP CALC LVOT DIAMETER: 2.21 CM
DOP CALC LVOT STROKE VOLUME: 78.48 CM3
DOP CALCLVOT PEAK VEL VTI: 20.47 CM
E WAVE DECELERATION TIME: 257.82 MSEC
E/A RATIO: 1.26
E/E' RATIO: 7.56
ECHO LV POSTERIOR WALL: 1.14 CM (ref 0.6–1.1)
EOSINOPHIL # BLD AUTO: 0.4 K/UL
EOSINOPHIL NFR BLD: 4.7 %
ERYTHROCYTE [DISTWIDTH] IN BLOOD BY AUTOMATED COUNT: 18.6 %
EST. GFR  (AFRICAN AMERICAN): >60 ML/MIN/1.73 M^2
EST. GFR  (NON AFRICAN AMERICAN): >60 ML/MIN/1.73 M^2
FRACTIONAL SHORTENING: 31 % (ref 28–44)
GLUCOSE SERPL-MCNC: 104 MG/DL
HCT VFR BLD AUTO: 26.3 %
HGB BLD-MCNC: 7.5 G/DL
IMM GRANULOCYTES # BLD AUTO: 0.05 K/UL
IMM GRANULOCYTES NFR BLD AUTO: 0.6 %
INR PPP: 0.9
INTERVENTRICULAR SEPTUM: 1.05 CM (ref 0.6–1.1)
IVRT: 0.11 MSEC
LA MAJOR: 5.89 CM
LA MINOR: 5.77 CM
LA WIDTH: 4 CM
LEFT ATRIUM SIZE: 4.1 CM
LEFT ATRIUM VOLUME INDEX: 29.1 ML/M2
LEFT ATRIUM VOLUME: 81.26 CM3
LEFT INTERNAL DIMENSION IN SYSTOLE: 3.72 CM (ref 2.1–4)
LEFT VENTRICLE DIASTOLIC VOLUME INDEX: 50.39 ML/M2
LEFT VENTRICLE DIASTOLIC VOLUME: 140.59 ML
LEFT VENTRICLE MASS INDEX: 83.4 G/M2
LEFT VENTRICLE SYSTOLIC VOLUME INDEX: 21.1 ML/M2
LEFT VENTRICLE SYSTOLIC VOLUME: 58.81 ML
LEFT VENTRICULAR INTERNAL DIMENSION IN DIASTOLE: 5.39 CM (ref 3.5–6)
LEFT VENTRICULAR MASS: 232.67 G
LV LATERAL E/E' RATIO: 6.8
LV SEPTAL E/E' RATIO: 8.5
LYMPHOCYTES # BLD AUTO: 2.2 K/UL
LYMPHOCYTES NFR BLD: 27.1 %
MAGNESIUM SERPL-MCNC: 1.9 MG/DL
MCH RBC QN AUTO: 20.7 PG
MCHC RBC AUTO-ENTMCNC: 28.5 G/DL
MCV RBC AUTO: 73 FL
MONOCYTES # BLD AUTO: 0.6 K/UL
MONOCYTES NFR BLD: 7.4 %
MV PEAK A VEL: 0.54 M/S
MV PEAK E VEL: 0.68 M/S
MV STENOSIS PRESSURE HALF TIME: 74.77 MS
MV VALVE AREA P 1/2 METHOD: 2.94 CM2
NEUTROPHILS # BLD AUTO: 4.9 K/UL
NEUTROPHILS NFR BLD: 59.7 %
NRBC BLD-RTO: 0 /100 WBC
PHOSPHATE SERPL-MCNC: 4.7 MG/DL
PISA TR MAX VEL: 2.05 M/S
PLATELET # BLD AUTO: 423 K/UL
PMV BLD AUTO: 10.3 FL
POTASSIUM SERPL-SCNC: 3.8 MMOL/L
PROT SERPL-MCNC: 6.6 G/DL
PROTHROMBIN TIME: 10 SEC
PULM VEIN S/D RATIO: 1.62
PV PEAK D VEL: 0.34 M/S
PV PEAK S VEL: 0.55 M/S
RA MAJOR: 5.2 CM
RA PRESSURE: 3 MMHG
RA WIDTH: 4.46 CM
RBC # BLD AUTO: 3.63 M/UL
RIGHT VENTRICULAR END-DIASTOLIC DIMENSION: 4.18 CM
RV TISSUE DOPPLER FREE WALL SYSTOLIC VELOCITY 1 (APICAL 4 CHAMBER VIEW): 0.1 M/S
SINUS: 3.22 CM
SODIUM SERPL-SCNC: 139 MMOL/L
STJ: 2.93 CM
TDI LATERAL: 0.1
TDI SEPTAL: 0.08
TDI: 0.09
TR MAX PG: 16.81 MMHG
TRICUSPID ANNULAR PLANE SYSTOLIC EXCURSION: 3.32 CM
TROPONIN I SERPL DL<=0.01 NG/ML-MCNC: <0.006 NG/ML
TV REST PULMONARY ARTERY PRESSURE: 19.81 MMHG
WBC # BLD AUTO: 8.27 K/UL

## 2018-11-07 PROCEDURE — 83735 ASSAY OF MAGNESIUM: CPT

## 2018-11-07 PROCEDURE — 99233 SBSQ HOSP IP/OBS HIGH 50: CPT | Mod: ,,, | Performed by: PSYCHIATRY & NEUROLOGY

## 2018-11-07 PROCEDURE — A4216 STERILE WATER/SALINE, 10 ML: HCPCS | Performed by: PHYSICIAN ASSISTANT

## 2018-11-07 PROCEDURE — 82553 CREATINE MB FRACTION: CPT

## 2018-11-07 PROCEDURE — 97165 OT EVAL LOW COMPLEX 30 MIN: CPT

## 2018-11-07 PROCEDURE — 84484 ASSAY OF TROPONIN QUANT: CPT

## 2018-11-07 PROCEDURE — 63600175 PHARM REV CODE 636 W HCPCS: Performed by: PHYSICIAN ASSISTANT

## 2018-11-07 PROCEDURE — 85610 PROTHROMBIN TIME: CPT

## 2018-11-07 PROCEDURE — 36415 COLL VENOUS BLD VENIPUNCTURE: CPT

## 2018-11-07 PROCEDURE — 25000003 PHARM REV CODE 250: Performed by: PHYSICIAN ASSISTANT

## 2018-11-07 PROCEDURE — G8996 SWALLOW CURRENT STATUS: HCPCS | Mod: CH

## 2018-11-07 PROCEDURE — G8998 SWALLOW D/C STATUS: HCPCS | Mod: CH

## 2018-11-07 PROCEDURE — 80053 COMPREHEN METABOLIC PANEL: CPT

## 2018-11-07 PROCEDURE — 84100 ASSAY OF PHOSPHORUS: CPT

## 2018-11-07 PROCEDURE — 85730 THROMBOPLASTIN TIME PARTIAL: CPT

## 2018-11-07 PROCEDURE — 85025 COMPLETE CBC W/AUTO DIFF WBC: CPT

## 2018-11-07 PROCEDURE — G8997 SWALLOW GOAL STATUS: HCPCS | Mod: CH

## 2018-11-07 PROCEDURE — 92523 SPEECH SOUND LANG COMPREHEN: CPT

## 2018-11-07 PROCEDURE — 82550 ASSAY OF CK (CPK): CPT

## 2018-11-07 PROCEDURE — 92610 EVALUATE SWALLOWING FUNCTION: CPT

## 2018-11-07 PROCEDURE — 20600001 HC STEP DOWN PRIVATE ROOM

## 2018-11-07 RX ORDER — ENOXAPARIN SODIUM 100 MG/ML
40 INJECTION SUBCUTANEOUS EVERY 24 HOURS
Status: DISCONTINUED | OUTPATIENT
Start: 2018-11-07 | End: 2018-11-07

## 2018-11-07 RX ORDER — MORPHINE SULFATE 4 MG/ML
2 INJECTION, SOLUTION INTRAMUSCULAR; INTRAVENOUS ONCE
Status: COMPLETED | OUTPATIENT
Start: 2018-11-07 | End: 2018-11-07

## 2018-11-07 RX ORDER — ONDANSETRON 2 MG/ML
8 INJECTION INTRAMUSCULAR; INTRAVENOUS ONCE
Status: COMPLETED | OUTPATIENT
Start: 2018-11-07 | End: 2018-11-07

## 2018-11-07 RX ORDER — ENOXAPARIN SODIUM 100 MG/ML
40 INJECTION SUBCUTANEOUS EVERY 12 HOURS
Status: DISCONTINUED | OUTPATIENT
Start: 2018-11-07 | End: 2018-11-08 | Stop reason: HOSPADM

## 2018-11-07 RX ADMIN — MORPHINE SULFATE 2 MG: 4 INJECTION, SOLUTION INTRAMUSCULAR; INTRAVENOUS at 12:11

## 2018-11-07 RX ADMIN — ACETAZOLAMIDE 500 MG: 500 CAPSULE, EXTENDED RELEASE ORAL at 08:11

## 2018-11-07 RX ADMIN — ENOXAPARIN SODIUM 40 MG: 100 INJECTION SUBCUTANEOUS at 08:11

## 2018-11-07 RX ADMIN — METRONIDAZOLE 500 MG: 500 TABLET ORAL at 08:11

## 2018-11-07 RX ADMIN — Medication 3 ML: at 02:11

## 2018-11-07 RX ADMIN — Medication 3 ML: at 06:11

## 2018-11-07 RX ADMIN — DOCUSATE SODIUM 100 MG: 100 CAPSULE, LIQUID FILLED ORAL at 08:11

## 2018-11-07 RX ADMIN — ONDANSETRON 8 MG: 2 INJECTION INTRAMUSCULAR; INTRAVENOUS at 12:11

## 2018-11-07 RX ADMIN — Medication 3 ML: at 10:11

## 2018-11-07 RX ADMIN — HYDROCODONE BITARTRATE AND ACETAMINOPHEN 1 TABLET: 5; 325 TABLET ORAL at 08:11

## 2018-11-07 NOTE — PT/OT/SLP EVAL
"Occupational Therapy   Evaluation & Discharge    Name: Bijal Molina  MRN: [id number]  Admitting Diagnosis:  Papilledema associated with increased intracranial pressure; Right homonymous hemianopsia    Recommendations:     Discharge Recommendations: home  Discharge Equipment Recommendations:  none  Barriers to discharge:   none    History:     Occupational Profile:  Living Environment: Pt's mother lives with her. 13 y/o daughter also within the home. SSH with 0 JAIME. Tub/shower combo.   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 indep with ADLs/self care/mobility- requiring no DME. Total hysterectomy on 10/29- has been on leave from work since. Recently got prescription glasses.   Roles and Routines: daughter, mother, care taker to self, employee, , home and community dweller  Equipment Used at Home:  none  Assistance upon Discharge: yes    Past Medical History:   Diagnosis Date    Anemia     Elevated intracranial pressure     Hypertension     Migraines     Obesity     Obesity        Past Surgical History:   Procedure Laterality Date     SECTION, LOW TRANSVERSE      x3    HYSTERECTOMY, TOTAL, ABDOMINAL N/A 10/29/2018    Performed by Margaret [facility]is MD at [facility] OR    SALPINGECTOMY Bilateral 10/29/2018    Procedure: SALPINGECTOMY;  Surgeon: Margaret Alexis MD;  Location: [facility] OR;  Service: OB/GYN;  Laterality: Bilateral;    SALPINGECTOMY Bilateral 10/29/2018    Performed by [facility] at [facility] OR    TOTAL ABDOMINAL HYSTERECTOMY N/A 10/29/2018    Procedure: HYSTERECTOMY, TOTAL, ABDOMINAL;  Surgeon: Margaret Alexis MD;  Location: [facility] OR;  Service: OB/GYN;  Laterality: N/A;  RN PREOP 10/22/2018-----BMI--55.2---  T&M FOR 2UNITS ORDERED----UPT    TUBAL LIGATION         Subjective     Chief Complaint: "I'm hungry, they haven't let me have food since like 2:00 yesterday. And I don't want a liquid " "diet"  Patient/Family Comments/goals: none reported    Pain/Comfort:  · Pain Rating 1: (c/o pain at incision site)  · Pain Rating Post-Intervention 1: (remained throughout)    Patients cultural, spiritual, Latter day conflicts given the current situation: none reported    Objective:     Communicated with: RN prior to session.  Patient found supine in bed and bed alarm, telemetry upon OT entry to room.    General Precautions: Standard, fall, vision impaired   Orthopedic Precautions:N/A   Braces: N/A     Occupational Performance:    Bed Mobility:    · Mod(I) with side rail    Functional Mobility/Transfers:  · Patient completed Sit <> Stand Transfer with modified independence  with  no assistive device   · Patient completed Bed <> Chair Transfer using Step Transfer technique with modified independence with no assistive device  · Functional Mobility: mod(I) for household and short community ~50 ft distance with no AD    Activities of Daily Living:  · Feeding:  NPO    · Grooming: modified independence standing  · Upper Body Dressing: modified independence gown as jacketl; completed all fine motor aspects  · Lower Body Dressing: minimum assistance 2/2 incision site pain    Cognitive/Visual Perceptual:  Cognitive/Psychosocial Skills:     -       Oriented to: Person, Place, Time and Situation   -       Follows Commands/attention:Follows multistep  commands  -       Communication: clear/fluent  -       Memory: No Deficits noted  -       Safety awareness/insight to disability: intact   -       Mood/Affect/Coping skills/emotional control: Cooperative  Visual/Perceptual:      -Impaired  mild R homonymous hemianospia; able to ID 5/5 items within R visual field with added time and cervical rotation       Physical Exam:  Balance:    -       WFL  Postural examination/scapula alignment:    -       Rounded shoulders  -       Posterior pelvic tilt  Dominant hand:    -       R  Upper Extremity Range of Motion:     -       Right Upper " "Extremity: WFL    -       Left Upper Extremity: WFL      Upper Extremity Strength:    -       Right Upper Extremity: WFL  -       Left Upper Extremity: WFL     Strength:    -       Right Upper Extremity: WFL    -       Left Upper Extremity: WFL    Fine Motor Coordination:    -       Intact  Left hand, finger to nose, Right hand, finger to nose, Left hand, manipulation of objects and Right hand, manipulation of objects  Gross motor coordination:   WFL B UE    AMPAC 6 Click ADL:  AMPAC Total Score: 24     Body mass index is 56.73 kg/m².    Vitals:    18 0830   BP: (!) 166/77   Pulse: 73   Resp: 16   Temp: 98.2 °F (36.8 °C)       Treatment & Education:  -Pt alert and agreeable to therapy eval; edu on OT role in care and POC for acute setting  -Communication board updated; questions/concerns addressed within OT scope of practice  -no family at bedside for education; Pt oriented to call button and need for staff supervision when OOB at this time with verbalized understanding  Education:    Patient left with bed in chair position with all lines intact, call button in reach, bed alarm on and RN notified    Assessment:     Bijal Molina is a 38 y.o. female with a medical diagnosis of Papilledema associated with increased intracranial pressure .  She presents with the following performance deficits : visual deficits-Right homonymous hemianopsia. Deficits are not currently affecting her functional indep with ADLs/self care/mobility. She demo compensatory techniques for vision at this time.    Rehab Prognosis: Good      Clinical Decision Makin.  OT Low:  "Pt evaluation falls under low complexity for evaluation coding due to performance deficits noted in 1-3 areas as stated above and 0 co-morbities affecting current functional status. Data obtained from problem focused assessments. No modifications or assistance was required for completion of evaluation. Only brief occupational profile and history review " "completed."     Plan:     Patient with no skilled OT needs while in acute or post acute setting. She is safe to be up with nursing staff for ADLs/self care/mobility. She would benefit from outpatient OT driving evaluation when cleared for driving by MD.    Time Tracking:     OT Date of Treatment: 11/07/18  OT Start Time: 0750  OT Stop Time: 0804  OT Total Time (min): 14 min    Billable Minutes:Evaluation 14    ELBA Baeza  11/7/2018    "

## 2018-11-07 NOTE — ASSESSMENT & PLAN NOTE
Seen in ophthalmology clinic today   Concern for stroke  Pending MRI to compare with prior.   Will admit to team - ddx includes venous infarct, ischemic stroke, and idiopathic intercranial edema   Potential angiogram tomorrow

## 2018-11-07 NOTE — HOSPITAL COURSE
11/7/18 - MRI with changes to suggest stroke vs RCVS. Visual field cut remains, plan for IR angiogram tomorrow, and potentially later an LP   11/8/18- angio delayed for emergency case, plan for angio today. Patient with some continued visual deficit     On multiple occasions, the patient expressed her irritation with being NPO for her test. Despite multiple attempts at explaining this to her, she was often appearing disgruntled and short in conversations with staff. I assured her that the NPO status was for her safety and that once she returned, our staff would do all we could to get her prompt meal trays.     Patient with no findings on angio that would benefit from stent placement   Patient with known increased intercranial pressure, will likely need outpatient LP - continue diamox  The patient with occipital lesion concerning for stroke which correlates with R homonomous hemianopsia    Etiology unknown at this time - mildly enlarged LA (30 day event monitor ordered), also with significant risk factors - obesity (BMI 56), hypertension, diet  The patient is to follow up with outpatient therapy as she is not safe for driving due to hemianopsia, I have stressed that she will need a driving eval multiple times and advised her she may schedule it whenever she would like, but that I cannot release her to drive at this time.     Patient stated understanding of above information and all questions were answered.    Inpatient acute stroke work up completed and patient is stable for discharge.  Please see imaging and discharge medication list below.

## 2018-11-07 NOTE — CONSULTS
Inpatient consult to Physical Medicine Rehab  Consult performed by: RONNY Yost  Consult ordered by: STONE Parmar  Reason for consult: assess rehab needs      Consult received.  Reviewed patient history and current admission.  Rehab team following.  Full consult to follow.    BRITTANIE Poon, RONNY-C  Physical Medicine & Rehabilitation   11/07/2018  Spectralink: [id number]

## 2018-11-07 NOTE — PLAN OF CARE
11/07/18 1130   Discharge Assessment   Assessment Type Discharge Planning Assessment   Confirmed/corrected address and phone number on facesheet? Yes   Assessment information obtained from? Patient   Expected Length of Stay (days) (TBD)   Communicated expected length of stay with patient/caregiver yes   Prior to hospitilization cognitive status: Alert/Oriented;No Deficits   Prior to hospitalization functional status: Independent   Current cognitive status: Alert/Oriented;No Deficits   Current Functional Status: Independent   Lives With significant other   Able to Return to Prior Arrangements unable to determine at this time (comments)   Is patient able to care for self after discharge? Unable to determine at this time (comments)   Who are your caregiver(s) and their phone number(s)? Jonatan Yun Significant other     [phone]    Patient's perception of discharge disposition home or selfcare   Readmission Within The Last 30 Days no previous admission in last 30 days   Patient currently being followed by outpatient case management? No   Patient currently receives any other outside agency services? No   Equipment Currently Used at Home none   Do you have any problems affording any of your prescribed medications? No   Is the patient taking medications as prescribed? yes   Does the patient have transportation home? Yes   Transportation Available car;family or friend will provide   Does the patient receive services at the [facility]mad[facility] Clinic? No   Discharge Plan A Home   Discharge Plan B Home with family   Patient/Family In Agreement With Plan yes

## 2018-11-07 NOTE — CONSULTS
[facility]  Vascular Neurology  Comprehensive Stroke Center  Consult Note    Inpatient consult to Vascular (Stroke) Neurology  Consult performed by: STONE Parmar  Consult ordered by: Jefry Machado MD  Reason for consult: hemianopsia        Assessment/Plan:     Patient is a 38 y.o. year old female with:    Right homonymous hemianopsia    Seen in ophthalmology clinic today   Concern for stroke  Pending MRI to compare with prior.   Will admit to team - ddx includes venous infarct, ischemic stroke, and idiopathic intercranial edema   Potential angiogram tomorrow      Essential hypertension    Stroke risk factor        S/P EVETTE (total abdominal hysterectomy)/BS/left oophorectomy    Surgery 10/29/18   For abnormal uterine bleeding, on percocet and ibuprofen for pain      Papilledema associated with increased intracranial pressure    Being followed by Dr Colvin. MRI and MRV prior reviewed   Noted to have bilateral right sided blurry vision today   Sent to the ED for stroke work up          STROKE DOCUMENTATION          NIH Scale:  1a. Level Of Consciousness: 0-->Alert: keenly responsive  1b. LOC Questions: 0-->Answers both questions correctly  1c. LOC Commands: 0-->Performs both tasks correctly  2. Best Gaze: 0-->Normal  3. Visual: 2-->Complete hemianopia  4. Facial Palsy: 0-->Normal symmetrical movements  5a. Motor Arm, Left: 0-->No drift: limb holds 90 (or 45) degrees for full 10 secs  5b. Motor Arm, Right: 0-->No drift: limb holds 90 (or 45) degrees for full 10 secs  6a. Motor Leg, Left: 0-->No drift: leg holds 30 degree position for full 5 secs  6b. Motor Leg, Right: 0-->No drift: leg holds 30 degree position for full 5 secs  7. Limb Ataxia: 0-->Absent  8. Sensory: 0-->Normal: no sensory loss  9. Best Language: 0-->No aphasia: normal  10. Dysarthria: 0-->Normal  11. Extinction and Inattention (formerly Neglect): 0-->No abnormality  Total (NIH Stroke Scale): 2    Modified [place] Score:  1  Jw Coma Scale:15   ABCD2 Score:    [id number] Score:   HAS -BLED Score:   ICH Score:   Hunt & Mejia Classification:       Thrombolysis Candidate? No, Out of window       Interventional Revascularization Candidate?   Is the patient eligible for mechanical endovascular reperfusion (SUNSHINE)?  No lvo       Hemorrhagic change of an Ischemic Stroke: Does this patient have an ischemic stroke with hemorrhagic changes? No     Subjective:     History of Present Illness:  38 year old female presents to the ED for evaluation of right sided vision field blurriness from ophthalmology clinic. The patient with history of multiple eye issues over the last month for which she was seeing Dr Colvin for. She was found on 10/3 to have increased edema on the ocular disc which prompted MRV and MRI. They did not see a mass lesion but was concerned for venous stenosis. The plan was to refer her to Dr Mccabe for potential stent for idiopathic intercranial edema.     The patient reports symptoms of right sided bilateral blurriness with headache x 1 week after waking up from surgery. The patient reports she did not seek medical attention sooner as she knew she had a follow up with Dr Colvin today. She denies any weakness, speech changes, confusion, or numbness outside of generalized weakness post hysterectomy 1 week ago.   Symptoms have been constant.   No attempted treatment prior to arrival and no identifiable aggravators          Past Medical History:   Diagnosis Date    Anemia     Elevated intracranial pressure     Hypertension     Migraines     Obesity     Obesity      Past Surgical History:   Procedure Laterality Date     SECTION, LOW TRANSVERSE      x3    HYSTERECTOMY, TOTAL, ABDOMINAL N/A 10/29/2018    Performed by [facility] at [facility] OR    SALPINGECTOMY Bilateral 10/29/2018    Procedure: SALPINGECTOMY;  Surgeon: Margaret Alexis MD;  Location: [facility] OR;  Service: OB/GYN;  Laterality:  Bilateral;    SALPINGECTOMY Bilateral 10/29/2018    Performed by [facility] at [facility] OR    TOTAL ABDOMINAL HYSTERECTOMY N/A 10/29/2018    Procedure: HYSTERECTOMY, TOTAL, ABDOMINAL;  Surgeon: Margaret Alexis MD;  Location: [facility] OR;  Service: OB/GYN;  Laterality: N/A;  RN PREOP 10/22/2018-----BMI--55.2---  T&M FOR 2UNITS ORDERED----UPT    TUBAL LIGATION  2004     Family History   Problem Relation Age of Onset    Breast cancer Mother     Breast cancer Maternal Aunt     Breast cancer Maternal Aunt      Social History     Tobacco Use    Smoking status: Current Every Day Smoker    Smokeless tobacco: Never Used   Substance Use Topics    Alcohol use: Yes     Frequency: Monthly or less     Comment: social    Drug use: Yes     Types: Marijuana     Comment: very rare      Review of patient's allergies indicates:   Allergen Reactions    Depo-provera contraceptive Blisters       Medications: I have reviewed the current medication administration record.      (Not in a hospital admission)    Review of Systems   Constitutional: Negative for fever.   HENT: Negative for drooling.    Eyes: Positive for pain and visual disturbance.   Respiratory: Negative for shortness of breath.    Genitourinary:        (+) hysterectomy 1 week ago   Skin: Positive for wound.   Neurological: Positive for headaches. Negative for facial asymmetry, weakness and numbness.   Hematological: Does not bruise/bleed easily.   Psychiatric/Behavioral: Negative for agitation and behavioral problems.     Objective:     Vital Signs (Most Recent):  Temp: 98.7 °F (37.1 °C) (11/06/18 1603)  Pulse: 85 (11/06/18 1603)  Resp: 18 (11/06/18 1603)  BP: (!) 172/86 (11/06/18 1603)  SpO2: 99 % (11/06/18 1603)    Vital Signs Range (Last 24H):  Temp:  [98.7 °F (37.1 °C)]   Pulse:  [85]   Resp:  [18]   BP: (172)/(86)   SpO2:  [99 %]     Physical Exam   Constitutional: She is oriented to person, place, and time. She appears well-developed and  well-nourished.   HENT:   Head: Normocephalic and atraumatic.   Eyes: Conjunctivae and EOM are normal.   Cardiovascular: Normal rate.   Pulmonary/Chest: Effort normal.   Musculoskeletal: Normal range of motion.   Neurological: She is alert and oriented to person, place, and time.   Skin: Skin is warm.   Psychiatric: She has a normal mood and affect. Her behavior is normal. Thought content normal.   Nursing note and vitals reviewed.      Neurological Exam:   LOC: alert  Attention Span: Good   Language: No aphasia  Articulation: No dysarthria  Orientation: Person, Place, Time   Visual Fields: blurriness bilaterally, right upper and lower field  EOM (CN III, IV, VI): Full/intact  Pupils (CN II, III): PERRL  Facial Sensation (CN V): Normal  Facial Movement (CN VII): Symmetric facial expression    Motor: Arm left  Normal 5/5  Leg left  Normal 5/5  Arm right  Normal 5/5  Leg right Normal 5/5  Cebellar: No evidence of appendicular or axial ataxia  Sensation: subjective decrease in sensation right upper extremity - still intact to light touch. patient with nromal sensation in legs and face   Tone: Normal tone throughout      Laboratory:  CMP:   Recent Labs   Lab 11/06/18  1731   CALCIUM 9.0   ALBUMIN 3.1*   PROT 7.5      K 3.7   CO2 24      BUN 10   CREATININE 0.7   ALKPHOS 94   ALT 14   AST 13   BILITOT 0.1     CBC:   Recent Labs   Lab 11/06/18  1731   WBC 9.77   RBC 4.01   HGB 8.2*   HCT 28.8*   *   MCV 72*   MCH 20.4*   MCHC 28.5*     Lipid Panel: No results for input(s): CHOL, LDLCALC, HDL, TRIG in the last 168 hours.  Coagulation:   Recent Labs   Lab 11/06/18  1731   INR 1.0     Hgb A1C: No results for input(s): HGBA1C in the last 168 hours.  TSH:   Recent Labs   Lab 11/06/18  1731   TSH 0.840       Diagnostic Results:      Brain imaging:  Pending       STONE Call  [facility]  Department of Vascular Neurology   [facility]

## 2018-11-07 NOTE — PLAN OF CARE
Problem: Occupational Therapy Goal  Goal: Occupational Therapy Goal  Outcome: Outcome(s) achieved Date Met: 11/07/18  OT eval completed. No skilled OT needs identified upon evaluation. Pt safe to be up with nursing staff/ADL/self care/mobility-- Discussed with RN (Donna) following. OT to sign off at this time. ELBA Baeza 11/7/2018

## 2018-11-07 NOTE — HPI
Bijal Molina is a 38-year-old female with PMHx of HTN, obesity, migraines, papilledema with associated increased intracranial pressure, and recent surgery for abnormal uterine bleeding s/p total abdominal hysterectomy/BS/L oophorectomy on 10/29/18.  Patient presented to [facility] from Ophthalmology clinic on 11/6/18 for right-sided vision field blurriness and headaches x 1 week post-op.  On arrival, evaluated by Vascular Neurology.  CTH without acute pathology.  Not a tPA candidate.  MRI brain showed nonspecific edema of L occipital lobe.       Functional History: Patient lives in [place] with *** in a *** story home with*** to enter.  Prior to admission, ***.  DME: ***.

## 2018-11-07 NOTE — PLAN OF CARE
Problem: Patient Care Overview  Goal: Plan of Care Review  Recommendations     Recommendation/Intervention: 1. When feasible, provide Cardiac diet.  Goals: 1. Pt to receive nutrition < 48 hrs.    Assessment and Plan  Nutrition Problem  Obesity     Related to (etiology):   Poor eating habits     Signs and Symptoms (as evidenced by):   BMI 56.9

## 2018-11-07 NOTE — PT/OT/SLP EVAL
Speech Language Pathology Evaluation  Cognitive/Bedside Swallow and Discharge Summary    Patient Name:  Bijal Molina   MRN:  [id number]  Admitting Diagnosis: <principal problem not specified>    Recommendations:                  General Recommendations:  Follow-up not indicated  Diet recommendations:  Regular, Thin   Aspiration Precautions: Standard aspiration precautions   General Precautions: Standard, fall, vision impaired  Communication strategies:  none    History:     Past Medical History:   Diagnosis Date    Anemia     Elevated intracranial pressure     Hypertension     Migraines     Obesity     Obesity        Past Surgical History:   Procedure Laterality Date     SECTION, LOW TRANSVERSE      x3    HYSTERECTOMY, TOTAL, ABDOMINAL N/A 10/29/2018    Performed by [facility] MD at [facility] OR    SALPINGECTOMY Bilateral 10/29/2018    Procedure: SALPINGECTOMY;  Surgeon: Margaret Alexis MD;  Location: [facility] OR;  Service: OB/GYN;  Laterality: Bilateral;    SALPINGECTOMY Bilateral 10/29/2018    Performed by Margaret [facility] at [facility] OR    TOTAL ABDOMINAL HYSTERECTOMY N/A 10/29/2018    Procedure: HYSTERECTOMY, TOTAL, ABDOMINAL;  Surgeon: Margaret Alexis MD;  Location: [facility] OR;  Service: OB/GYN;  Laterality: N/A;  RN PREOP 10/22/2018-----BMI--55.2---  T&M FOR 2UNITS ORDERED----UPT    TUBAL LIGATION         Social History: Patient lives with spouse.  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   .    Prior Intubation HX:  None    Prior diet: Regular / thin    Subjective     Awake; alert.   agitated regarding NPO status and multiple blood draws.     Pain/Comfort:  · Pain Rating 1: 0/10    Objective:     Cognitive Status:    Arousal/Alertness Appropriate response to stimuli  Attention No obvious deficits observed throughout session  Perseveration Not present  Orientation Oriented x4  Memory Immediate Recall WFL, DelayedWFL patient recalled 2/3  "unassociated words after 1 min delay and recall general information WFL  Problem Solving Categories patient provided 15 items within concrete category in 1 min. , Sequencing of 4 step items completed with 100% acc, Solutions to hypothetical situations completed with 100% acc and Compare/contrast completed with 100% acc of concrete objects  Simple calculation WFL     Receptive Language:   Comprehension:      Questions Complex yes/no WFL  Commands  multistep basic commands WFL    Expressive Language:  Verbal:    Naming Confrontation WFL and Single word responsive naming WFL  Conversational speech WFL      Motor Speech:  WFL    Voice:   WFL    Visual-Spatial:  WFL    Reading:   WFL     Written Expression:   WFL    Oral Musculature Evaluation  · Oral Musculature: WFL  · Structural Abnormalities: None  · Dentition: present and adequate  · Secretion Management: problems swallowing secretions  · Mucosal Quality: good  · Mandibular Strength and Mobility: WFL  · Oral Labial Strength and Mobility: WFL  · Lingual Strength and Mobility: WFL  · Velar Elevation: WFL  · Buccal Strength and Mobility: WFL    Bedside Swallow Eval:   Consistencies Assessed:  · Thin liquids via straw sip x5  · Solids via 1 whole cracker     Oral Phase:   · WFL    Pharyngeal Phase:   · no overt clinical signs/symptoms of aspiration  · no overt clinical signs/symptoms of pharyngeal dysphagia    Compensatory Strategies  · None    Treatment: Patient was provided skilled education regarding diet recs, aspiration precautions, diet recommendations, and results of full cog-ling assessment. Patient reported to be at baseline  functioning, except for "blurry vision". ST discussed discharge status with patient, patient verbalized understanding and is in agreement with plan of care.     Assessment:     Bijal Molina is a 38 y.o. female with an SLP diagnosis of no oropharyngeal dyspahgia or cognitive impairments noted. .      Plan:     · Patient to be seen:    "   · Plan of Care expires:     · Plan of Care reviewed with:  patient   · SLP Follow-Up:  No       Discharge recommendations:  Discharge Facility/Level Of Care Needs: home   Barriers to Discharge:  None    Time Tracking:     SLP Treatment Date:   11/07/18  Speech Start Time:  0927  Speech Stop Time:  0943     Speech Total Time (min):  16 min    Billable Minutes: Eval 8  and Eval Swallow and Oral Function 8    Emily P. Abadie M.S., CCC-SLP  Speech Language Pathologist  [phone]  11/07/2018

## 2018-11-07 NOTE — ASSESSMENT & PLAN NOTE
Being followed by Dr Colvin. MRI and MRV prior reviewed   Noted to have bilateral right sided blurry vision today   Sent to the ED for stroke work up

## 2018-11-07 NOTE — SUBJECTIVE & OBJECTIVE
Neurologic Chief Complaint: rcvs vs stroke     Subjective:     Interval History: Patient is seen for follow-up neurological assessment and treatment recommendations: MRI with changes to suggest stroke vs RCVS. Visual field cut remains, plan for IR angiogram tomorrow, and potentially later an LP     HPI, Past Medical, Family, and Social History remains the same as documented in the initial encounter.     Review of Systems   Constitutional: Negative for diaphoresis.   Eyes: Positive for visual disturbance.   Respiratory: Negative for shortness of breath.    Gastrointestinal: Positive for abdominal pain (from  hysterectomy pain).   Skin: Positive for wound.   Neurological: Negative for facial asymmetry, weakness and numbness.     Scheduled Meds:   acetaZOLAMIDE  500 mg Oral BID    aspirin  325 mg Oral Daily    clopidogrel  75 mg Oral Daily    docusate sodium  100 mg Oral BID    iron polysaccharides  1 capsule Oral Daily    lisinopril  40 mg Oral Daily    metroNIDAZOLE  500 mg Oral Q12H    prochlorperazine  10 mg Intravenous Once    sodium chloride 0.9%  3 mL Intravenous Q8H     Continuous Infusions:   sodium chloride 0.9%       PRN Meds:hydrALAZINE, HYDROcodone-acetaminophen, influenza, sodium chloride 0.9%    Objective:     Vital Signs (Most Recent):  Temp: 97.7 °F (36.5 °C) (11/07/18 1523)  Pulse: 73 (11/07/18 1523)  Resp: 18 (11/07/18 1523)  BP: (!) 154/78 (11/07/18 1523)  SpO2: 95 % (11/07/18 1523)  BP Location: Right arm    Vital Signs Range (Last 24H):  Temp:  [97.4 °F (36.3 °C)-99 °F (37.2 °C)]   Pulse:  [66-85]   Resp:  [16-20]   BP: (137-186)/(62-84)   SpO2:  [94 %-100 %]   BP Location: Right arm    Physical Exam   Constitutional: She is oriented to person, place, and time. She appears well-developed and well-nourished.   HENT:   Head: Normocephalic and atraumatic.   Cardiovascular: Normal rate.   Pulmonary/Chest: Effort normal.   Neurological: She is alert and oriented to person, place, and time.    Skin: Skin is warm and dry.   Nursing note and vitals reviewed.      Neurological Exam:   LOC: alert  Attention Span: Good   Language: No aphasia  Articulation: No dysarthria  Orientation: Person, Place, Time   Visual Fields: right hemianopsia   EOM (CN III, IV, VI): Full/intact  Pupils (CN II, III): PERRL  Facial Movement (CN VII): Symmetric facial expression    Motor: Arm left  Normal 5/5  Leg left  Normal 5/5  Arm right  Normal 5/5  Leg right Normal 5/5  Sensation: Intact to light touch, temperature and vibration  Tone: Normal tone throughout    Laboratory:  CMP:   Recent Labs   Lab 11/07/18  0508   CALCIUM 8.6*   ALBUMIN 2.8*   PROT 6.6      K 3.8   CO2 23      BUN 14   CREATININE 0.7   ALKPHOS 95   ALT 10   AST 14   BILITOT 0.2     CBC:   Recent Labs   Lab 11/07/18  0508   WBC 8.27   RBC 3.63*   HGB 7.5*   HCT 26.3*   *   MCV 73*   MCH 20.7*   MCHC 28.5*     Lipid Panel:   Recent Labs   Lab 11/06/18  1731   CHOL 150   LDLCALC 84.0   HDL 36*   TRIG 150     Coagulation:   Recent Labs   Lab 11/07/18  0508   INR 0.9   APTT 21.9     Platelet Aggregation Study: No results for input(s): PLTAGG, PLTAGINTERP, PLTAGREGLACO, ADPPLTAGGREG in the last 168 hours.  Hgb A1C: No results for input(s): HGBA1C in the last 168 hours.  TSH:   Recent Labs   Lab 11/06/18  1731   TSH 0.840       Diagnostic Results     CT head   No acute intracranial abnormalities  Empty sella configuration, unchanged from MRI.    MRI brain   Interval development of increased T2/FLAIR signal in the left occipital lobe since the prior examination. No associated mass effect.  There is T2 shine through signal on the diffusion sequence in this region without ADC match.  Finding represents nonspecific edema of uncertain etiology.    Remainder of the exam appears unchanged from prior.    TTE   · Left ventricle ejection fraction is normal at 55%  · Left atrium is mildly dilated.  · The estimated PA systolic pressure is 20 mm Hg  · Normal  central venous pressure (3 mm Hg).  · LA mildly enlarged

## 2018-11-07 NOTE — ASSESSMENT & PLAN NOTE
Seen in ophthalmology clinic today   Concern for stroke  Pending MRI to compare with prior.   Will admit to team - ddx includes venous infarct, ischemic stroke, and idiopathic intercranial edema   angiogram tomorrow     Antiplatelets: asa and plavix   Statins: LDL - 84 start atorvastatin   SBP normotensive  DVT ppx: lovenox    PT/OT and speech to evaluate and treat  Neuro checks q4h  Pending - LP and IR angiogram

## 2018-11-07 NOTE — SUBJECTIVE & OBJECTIVE
Past Medical History:   Diagnosis Date    Anemia     Elevated intracranial pressure     Hypertension     Migraines     Obesity     Obesity      Past Surgical History:   Procedure Laterality Date     SECTION, LOW TRANSVERSE      x3    HYSTERECTOMY, TOTAL, ABDOMINAL N/A 10/29/2018    Performed by Margaret Alexis MD at [facility] OR    SALPINGECTOMY Bilateral 10/29/2018    Procedure: SALPINGECTOMY;  Surgeon: Margaret Alexis MD;  Location: [facility] OR;  Service: OB/GYN;  Laterality: Bilateral;    SALPINGECTOMY Bilateral 10/29/2018    Performed by Margaret Alexis MD at [facility] OR    TOTAL ABDOMINAL HYSTERECTOMY N/A 10/29/2018    Procedure: HYSTERECTOMY, TOTAL, ABDOMINAL;  Surgeon: Margaret Alexis MD;  Location: [facility] OR;  Service: OB/GYN;  Laterality: N/A;  RN PREOP 10/22/2018-----BMI--55.2---  T&M FOR 2UNITS ORDERED----UPT    TUBAL LIGATION  2004     Family History   Problem Relation Age of Onset    Breast cancer Mother     Breast cancer Maternal Aunt     Breast cancer Maternal Aunt      Social History     Tobacco Use    Smoking status: Current Every Day Smoker    Smokeless tobacco: Never Used   Substance Use Topics    Alcohol use: Yes     Frequency: Monthly or less     Comment: social    Drug use: Yes     Types: Marijuana     Comment: very rare      Review of patient's allergies indicates:   Allergen Reactions    Depo-provera contraceptive Blisters       Medications: I have reviewed the current medication administration record.      (Not in a hospital admission)    Review of Systems   Constitutional: Negative for fever.   HENT: Negative for drooling.    Eyes: Positive for pain and visual disturbance.   Respiratory: Negative for shortness of breath.    Genitourinary:        (+) hysterectomy 1 week ago   Skin: Positive for wound.   Neurological: Positive for headaches. Negative for facial asymmetry, weakness and numbness.   Hematological: Does not bruise/bleed easily.    Psychiatric/Behavioral: Negative for agitation and behavioral problems.     Objective:     Vital Signs (Most Recent):  Temp: 98.7 °F (37.1 °C) (11/06/18 1603)  Pulse: 85 (11/06/18 1603)  Resp: 18 (11/06/18 1603)  BP: (!) 172/86 (11/06/18 1603)  SpO2: 99 % (11/06/18 1603)    Vital Signs Range (Last 24H):  Temp:  [98.7 °F (37.1 °C)]   Pulse:  [85]   Resp:  [18]   BP: (172)/(86)   SpO2:  [99 %]     Physical Exam   Constitutional: She is oriented to person, place, and time. She appears well-developed and well-nourished.   HENT:   Head: Normocephalic and atraumatic.   Eyes: Conjunctivae and EOM are normal.   Cardiovascular: Normal rate.   Pulmonary/Chest: Effort normal.   Musculoskeletal: Normal range of motion.   Neurological: She is alert and oriented to person, place, and time.   Skin: Skin is warm.   Psychiatric: She has a normal mood and affect. Her behavior is normal. Thought content normal.   Nursing note and vitals reviewed.      Neurological Exam:   LOC: alert  Attention Span: Good   Language: No aphasia  Articulation: No dysarthria  Orientation: Person, Place, Time   Visual Fields: blurriness bilaterally, right upper and lower field  EOM (CN III, IV, VI): Full/intact  Pupils (CN II, III): PERRL  Facial Sensation (CN V): Normal  Facial Movement (CN VII): Symmetric facial expression    Motor: Arm left  Normal 5/5  Leg left  Normal 5/5  Arm right  Normal 5/5  Leg right Normal 5/5  Cebellar: No evidence of appendicular or axial ataxia  Sensation: subjective decrease in sensation right upper extremity - still intact to light touch. patient with nromal sensation in legs and face   Tone: Normal tone throughout      Laboratory:  CMP:   Recent Labs   Lab 11/06/18  1731   CALCIUM 9.0   ALBUMIN 3.1*   PROT 7.5      K 3.7   CO2 24      BUN 10   CREATININE 0.7   ALKPHOS 94   ALT 14   AST 13   BILITOT 0.1     CBC:   Recent Labs   Lab 11/06/18  1731   WBC 9.77   RBC 4.01   HGB 8.2*   HCT 28.8*   *   MCV  72*   MCH 20.4*   MCHC 28.5*     Lipid Panel: No results for input(s): CHOL, LDLCALC, HDL, TRIG in the last 168 hours.  Coagulation:   Recent Labs   Lab 11/06/18  1731   INR 1.0     Hgb A1C: No results for input(s): HGBA1C in the last 168 hours.  TSH:   Recent Labs   Lab 11/06/18  1731   TSH 0.840       Diagnostic Results:      Brain imaging:  Pending

## 2018-11-07 NOTE — CONSULTS
PM&R consult follow up.      Evaluated by therapy.  OT and SLP discharged from service.  Patient near prior level of function and without rehab goals.  Will sign off.  Please call with questions/concerns or re-consult if situation changes.    Thank you for consult.    BRITTANIE Poon, FNP-C  Physical Medicine & Rehabilitation   11/07/2018  Spectralink: [id number]

## 2018-11-07 NOTE — PROGRESS NOTES
Pt remains NPO. Explained in detail that she is NPO for testing. Angry. Attempts to get any and all staff to change her NPO status. States that she is going to go to the cafeteria. Notified stroke team.

## 2018-11-07 NOTE — PROGRESS NOTES
[facility]  Vascular Neurology  Comprehensive Stroke Center  Progress Note    Assessment/Plan:     Right homonymous hemianopsia    Seen in ophthalmology clinic today   Concern for stroke  Pending MRI to compare with prior.   Will admit to team - ddx includes venous infarct, ischemic stroke, and idiopathic intercranial edema   angiogram tomorrow     Antiplatelets: asa and plavix   Statins: LDL - 84 start atorvastatin   SBP normotensive  DVT ppx: lovenox    PT/OT and speech to evaluate and treat  Neuro checks q4h  Pending - LP and IR angiogram        Essential hypertension    Stroke risk factor        Class 3 severe obesity due to excess calories with serious comorbidity and body mass index (BMI) of 50.0 to 59.9 in adult    Dietary consult recommended  Stroke risk factor      S/P EVETTE (total abdominal hysterectomy)/BS/left oophorectomy    Surgery 10/29/18   For abnormal uterine bleeding, on percocet and ibuprofen for pain      Papilledema associated with increased intracranial pressure    Being followed by Dr Colvin. MRI and MRV prior reviewed   Noted to have bilateral right sided blurry vision today   Sent to the ED for stroke work up           11/7/18 - MRI with changes to suggest stroke vs RCVS. Visual field cut remains, plan for IR angiogram tomorrow, and potentially later an LP     STROKE DOCUMENTATION        NIH Scale:  1a. Level Of Consciousness: 0-->Alert: keenly responsive  1b. LOC Questions: 0-->Answers both questions correctly  1c. LOC Commands: 0-->Performs both tasks correctly  2. Best Gaze: 0-->Normal  3. Visual: 0-->No visual loss  4. Facial Palsy: 2-->Partial paralysis (total or near-total paralysis of lower face)  5a. Motor Arm, Left: 0-->No drift: limb holds 90 (or 45) degrees for full 10 secs  5b. Motor Arm, Right: 0-->No drift: limb holds 90 (or 45) degrees for full 10 secs  6a. Motor Leg, Left: 0-->No drift: leg holds 30 degree position for full 5 secs  6b. Motor Leg, Right:  0-->No drift: leg holds 30 degree position for full 5 secs  7. Limb Ataxia: 0-->Absent  8. Sensory: 0-->Normal: no sensory loss  9. Best Language: 0-->No aphasia: normal  10. Dysarthria: 0-->Normal  11. Extinction and Inattention (formerly Neglect): 0-->No abnormality  Total (NIH Stroke Scale): 2       Modified Roni Score: 1  Jw Coma Scale:    ABCD2 Score:    [id number] Score:   HAS -BLED Score:   ICH Score:   Hunt & Mejia Classification:      Hemorrhagic change of an Ischemic Stroke: Does this patient have an ischemic stroke with hemorrhagic changes? No     Neurologic Chief Complaint: rcvs vs stroke     Subjective:     Interval History: Patient is seen for follow-up neurological assessment and treatment recommendations: MRI with changes to suggest stroke vs RCVS. Visual field cut remains, plan for IR angiogram tomorrow, and potentially later an LP     HPI, Past Medical, Family, and Social History remains the same as documented in the initial encounter.     Review of Systems   Constitutional: Negative for diaphoresis.   Eyes: Positive for visual disturbance.   Respiratory: Negative for shortness of breath.    Gastrointestinal: Positive for abdominal pain (from  hysterectomy pain).   Skin: Positive for wound.   Neurological: Negative for facial asymmetry, weakness and numbness.     Scheduled Meds:   acetaZOLAMIDE  500 mg Oral BID    aspirin  325 mg Oral Daily    clopidogrel  75 mg Oral Daily    docusate sodium  100 mg Oral BID    iron polysaccharides  1 capsule Oral Daily    lisinopril  40 mg Oral Daily    metroNIDAZOLE  500 mg Oral Q12H    prochlorperazine  10 mg Intravenous Once    sodium chloride 0.9%  3 mL Intravenous Q8H     Continuous Infusions:   sodium chloride 0.9%       PRN Meds:hydrALAZINE, HYDROcodone-acetaminophen, influenza, sodium chloride 0.9%    Objective:     Vital Signs (Most Recent):  Temp: 97.7 °F (36.5 °C) (11/07/18 1523)  Pulse: 73 (11/07/18 1523)  Resp: 18 (11/07/18  1523)  BP: (!) 154/78 (11/07/18 1523)  SpO2: 95 % (11/07/18 1523)  BP Location: Right arm    Vital Signs Range (Last 24H):  Temp:  [97.4 °F (36.3 °C)-99 °F (37.2 °C)]   Pulse:  [66-85]   Resp:  [16-20]   BP: (137-186)/(62-84)   SpO2:  [94 %-100 %]   BP Location: Right arm    Physical Exam   Constitutional: She is oriented to person, place, and time. She appears well-developed and well-nourished.   HENT:   Head: Normocephalic and atraumatic.   Cardiovascular: Normal rate.   Pulmonary/Chest: Effort normal.   Neurological: She is alert and oriented to person, place, and time.   Skin: Skin is warm and dry.   Nursing note and vitals reviewed.      Neurological Exam:   LOC: alert  Attention Span: Good   Language: No aphasia  Articulation: No dysarthria  Orientation: Person, Place, Time   Visual Fields: right hemianopsia   EOM (CN III, IV, VI): Full/intact  Pupils (CN II, III): PERRL  Facial Movement (CN VII): Symmetric facial expression    Motor: Arm left  Normal 5/5  Leg left  Normal 5/5  Arm right  Normal 5/5  Leg right Normal 5/5  Sensation: Intact to light touch, temperature and vibration  Tone: Normal tone throughout    Laboratory:  CMP:   Recent Labs   Lab 11/07/18  0508   CALCIUM 8.6*   ALBUMIN 2.8*   PROT 6.6      K 3.8   CO2 23      BUN 14   CREATININE 0.7   ALKPHOS 95   ALT 10   AST 14   BILITOT 0.2     CBC:   Recent Labs   Lab 11/07/18  0508   WBC 8.27   RBC 3.63*   HGB 7.5*   HCT 26.3*   *   MCV 73*   MCH 20.7*   MCHC 28.5*     Lipid Panel:   Recent Labs   Lab 11/06/18  1731   CHOL 150   LDLCALC 84.0   HDL 36*   TRIG 150     Coagulation:   Recent Labs   Lab 11/07/18  0508   INR 0.9   APTT 21.9     Platelet Aggregation Study: No results for input(s): PLTAGG, PLTAGINTERP, PLTAGREGLACO, ADPPLTAGGREG in the last 168 hours.  Hgb A1C: No results for input(s): HGBA1C in the last 168 hours.  TSH:   Recent Labs   Lab 11/06/18  1731   TSH 0.840       Diagnostic Results     CT head   No acute  intracranial abnormalities  Empty sella configuration, unchanged from MRI.    MRI brain   Interval development of increased T2/FLAIR signal in the left occipital lobe since the prior examination. No associated mass effect.  There is T2 shine through signal on the diffusion sequence in this region without ADC match.  Finding represents nonspecific edema of uncertain etiology.    Remainder of the exam appears unchanged from prior.    TTE   · Left ventricle ejection fraction is normal at 55%  · Left atrium is mildly dilated.  · The estimated PA systolic pressure is 20 mm Hg  · Normal central venous pressure (3 mm Hg).  · LA mildly enlarged            STONE Call  Comprehensive Stroke Center  Department of Vascular Neurology   [facility]

## 2018-11-07 NOTE — PLAN OF CARE
Problem: Physical Therapy Goal  Goal: Physical Therapy Goal    I was able to observe Ms. Molina ambulating to restroom with supervision with no assistive device (no instability) upon entrance into her room. Based on my observation, the occupational therapist's evaluation and inquiring if Ms. Molina believes she needs PT services, I was able to glean that she is not in need of acute care PT services at this time. Thus, PT will sign off.

## 2018-11-07 NOTE — CONSULTS
"  [facility]  Adult Nutrition  Consult Note    SUMMARY     Recommendations    Recommendation/Intervention: 1. When feasible, provide Cardiac diet.  Goals: 1. Pt to receive nutrition < 48 hrs.  Nutrition Goal Status: new  Communication of RD Recs: other (comment)(POC)    Reason for Assessment    Reason for Assessment: consult  Diagnosis: stroke/CVA  Relevant Medical History: anemia, HTN, migraines, elevated intercranial pressure  Interdisciplinary Rounds: did not attend  General Information Comments: Pt with vision difficulty passed TIFFANY, continues NPO for testing. Pt off unit for tests, will follow up.    Nutrition Risk Screen    Nutrition Risk Screen: no indicators present    Nutrition/Diet History    Do you have any cultural, spiritual, Mormonism conflicts, given your current situation?: none reported    Anthropometrics    Temp: 97.4 °F (36.3 °C)  Height Method: Stated  Height: 5' 7" (170.2 cm)  Height (inches): 67 in  Weight Method: Standard Scale  Weight: (!) 164.2 kg (362 lb)  Weight (lb): (!) 362 lb  Ideal Body Weight (IBW), Female: 135 lb  % Ideal Body Weight, Female (lb): 268.15 lb  BMI (Calculated): 56.8       Lab/Procedures/Meds    Pertinent Labs Reviewed: reviewed  Pertinent Labs Comments: H/H 7.5/26.3, Phos 4.7, Alb 2.8  Pertinent Medications Reviewed: reviewed  Pertinent Medications Comments: Colace, IV NS    Physical Findings/Assessment    Overall Physical Appearance: other (see comments)(ENZO)    Estimated/Assessed Needs    Weight Used For Calorie Calculations: (!) 164.2 kg (361 lb 15.9 oz)  Energy Calorie Requirements (kcal): 2350  Energy Need Method: [place]-St Jeor(no activity factor used)  Protein Requirements: 80-94g  Weight Used For Protein Calculations: 66.8 kg (147 lb 4.3 oz)  RDA Method (mL): 2350     Nutrition Prescription Ordered    Current Diet Order: NPO    Evaluation of Received Nutrient/Fluid Intake    % Intake of Estimated Energy Needs: 0 - 25 %  % Meal Intake: " NPO    Nutrition Risk    Level of Risk/Frequency of Follow-up: low     Assessment and Plan  Nutrition Problem  Obesity    Related to (etiology):   Poor eating habits    Signs and Symptoms (as evidenced by):   BMI 56.9    Nutrition Diagnosis Status:   New    Monitor and Evaluation    Food and Nutrient Intake: energy intake, food and beverage intake  Food and Nutrient Adminstration: diet order  Knowledge/Beliefs/Attitudes: food and nutrition knowledge/skill  Physical Activity and Function: factors affecting access to physical activity  Anthropometric Measurements: weight, weight change, body mass index  Biochemical Data, Medical Tests and Procedures: electrolyte and renal panel, gastrointestinal profile, glucose/endocrine profile, inflammatory profile, lipid profile  Nutrition-Focused Physical Findings: (ENZO)     Nutrition Follow-Up    RD Follow-up?: Yes

## 2018-11-08 VITALS
OXYGEN SATURATION: 99 % | BODY MASS INDEX: 45.99 KG/M2 | HEART RATE: 66 BPM | TEMPERATURE: 97 F | RESPIRATION RATE: 18 BRPM | WEIGHT: 293 LBS | HEIGHT: 67 IN | DIASTOLIC BLOOD PRESSURE: 81 MMHG | SYSTOLIC BLOOD PRESSURE: 169 MMHG

## 2018-11-08 PROBLEM — I63.9 OCCIPITAL STROKE: Status: ACTIVE | Noted: 2018-11-08

## 2018-11-08 PROBLEM — I51.7 LEFT ATRIAL ENLARGEMENT: Status: ACTIVE | Noted: 2018-11-08

## 2018-11-08 PROBLEM — I63.532 ACUTE ISCHEMIC LEFT PCA STROKE: Status: ACTIVE | Noted: 2018-11-06

## 2018-11-08 LAB
ALBUMIN SERPL BCP-MCNC: 2.8 G/DL
ALP SERPL-CCNC: 91 U/L
ALT SERPL W/O P-5'-P-CCNC: 12 U/L
ANION GAP SERPL CALC-SCNC: 6 MMOL/L
AST SERPL-CCNC: 15 U/L
BASOPHILS # BLD AUTO: 0.04 K/UL
BASOPHILS NFR BLD: 0.5 %
BILIRUB SERPL-MCNC: 0.1 MG/DL
BUN SERPL-MCNC: 15 MG/DL
CALCIUM SERPL-MCNC: 8.6 MG/DL
CHLORIDE SERPL-SCNC: 108 MMOL/L
CO2 SERPL-SCNC: 24 MMOL/L
CREAT SERPL-MCNC: 0.8 MG/DL
DIFFERENTIAL METHOD: ABNORMAL
EOSINOPHIL # BLD AUTO: 0.4 K/UL
EOSINOPHIL NFR BLD: 5 %
ERYTHROCYTE [DISTWIDTH] IN BLOOD BY AUTOMATED COUNT: 18.4 %
EST. GFR  (AFRICAN AMERICAN): >60 ML/MIN/1.73 M^2
EST. GFR  (NON AFRICAN AMERICAN): >60 ML/MIN/1.73 M^2
GLUCOSE SERPL-MCNC: 107 MG/DL
HCT VFR BLD AUTO: 26.6 %
HGB BLD-MCNC: 7.6 G/DL
IMM GRANULOCYTES # BLD AUTO: 0.09 K/UL
IMM GRANULOCYTES NFR BLD AUTO: 1.2 %
LYMPHOCYTES # BLD AUTO: 1.9 K/UL
LYMPHOCYTES NFR BLD: 25.4 %
MCH RBC QN AUTO: 20.9 PG
MCHC RBC AUTO-ENTMCNC: 28.6 G/DL
MCV RBC AUTO: 73 FL
MONOCYTES # BLD AUTO: 0.5 K/UL
MONOCYTES NFR BLD: 6.5 %
NEUTROPHILS # BLD AUTO: 4.5 K/UL
NEUTROPHILS NFR BLD: 61.4 %
NRBC BLD-RTO: 0 /100 WBC
PLATELET # BLD AUTO: 481 K/UL
PMV BLD AUTO: 9.9 FL
POTASSIUM SERPL-SCNC: 3.9 MMOL/L
PROT SERPL-MCNC: 6.8 G/DL
RBC # BLD AUTO: 3.64 M/UL
SODIUM SERPL-SCNC: 138 MMOL/L
WBC # BLD AUTO: 7.33 K/UL

## 2018-11-08 PROCEDURE — A4216 STERILE WATER/SALINE, 10 ML: HCPCS | Performed by: PHYSICIAN ASSISTANT

## 2018-11-08 PROCEDURE — 25500020 PHARM REV CODE 255: Performed by: PSYCHIATRY & NEUROLOGY

## 2018-11-08 PROCEDURE — 85025 COMPLETE CBC W/AUTO DIFF WBC: CPT

## 2018-11-08 PROCEDURE — B31C1ZZ FLUOROSCOPY OF BILATERAL EXTERNAL CAROTID ARTERIES USING LOW OSMOLAR CONTRAST: ICD-10-PCS | Performed by: INTERNAL MEDICINE

## 2018-11-08 PROCEDURE — B31G1ZZ FLUOROSCOPY OF BILATERAL VERTEBRAL ARTERIES USING LOW OSMOLAR CONTRAST: ICD-10-PCS | Performed by: INTERNAL MEDICINE

## 2018-11-08 PROCEDURE — 63600175 PHARM REV CODE 636 W HCPCS: Performed by: RADIOLOGY

## 2018-11-08 PROCEDURE — 63600175 PHARM REV CODE 636 W HCPCS: Performed by: PHYSICIAN ASSISTANT

## 2018-11-08 PROCEDURE — 25000003 PHARM REV CODE 250: Performed by: PHYSICIAN ASSISTANT

## 2018-11-08 PROCEDURE — 36415 COLL VENOUS BLD VENIPUNCTURE: CPT

## 2018-11-08 PROCEDURE — 80053 COMPREHEN METABOLIC PANEL: CPT

## 2018-11-08 PROCEDURE — B3151ZZ FLUOROSCOPY OF BILATERAL COMMON CAROTID ARTERIES USING LOW OSMOLAR CONTRAST: ICD-10-PCS | Performed by: INTERNAL MEDICINE

## 2018-11-08 PROCEDURE — 25000003 PHARM REV CODE 250: Performed by: NURSE PRACTITIONER

## 2018-11-08 PROCEDURE — B3181ZZ FLUOROSCOPY OF BILATERAL INTERNAL CAROTID ARTERIES USING LOW OSMOLAR CONTRAST: ICD-10-PCS | Performed by: INTERNAL MEDICINE

## 2018-11-08 PROCEDURE — B41F1ZZ FLUOROSCOPY OF RIGHT LOWER EXTREMITY ARTERIES USING LOW OSMOLAR CONTRAST: ICD-10-PCS | Performed by: INTERNAL MEDICINE

## 2018-11-08 PROCEDURE — 99233 SBSQ HOSP IP/OBS HIGH 50: CPT | Mod: ,,, | Performed by: PSYCHIATRY & NEUROLOGY

## 2018-11-08 RX ORDER — ATORVASTATIN CALCIUM 20 MG/1
40 TABLET, FILM COATED ORAL DAILY
Status: DISCONTINUED | OUTPATIENT
Start: 2018-11-08 | End: 2018-11-08 | Stop reason: HOSPADM

## 2018-11-08 RX ORDER — SODIUM CHLORIDE 0.9 % (FLUSH) 0.9 %
5 SYRINGE (ML) INJECTION
Status: DISCONTINUED | OUTPATIENT
Start: 2018-11-08 | End: 2018-11-08 | Stop reason: HOSPADM

## 2018-11-08 RX ORDER — ATORVASTATIN CALCIUM 40 MG/1
40 TABLET, FILM COATED ORAL DAILY
Qty: 30 TABLET | Refills: 1 | Status: SHIPPED | OUTPATIENT
Start: 2018-11-09 | End: 2019-11-09

## 2018-11-08 RX ORDER — IODIXANOL 320 MG/ML
150 INJECTION, SOLUTION INTRAVASCULAR
Status: COMPLETED | OUTPATIENT
Start: 2018-11-08 | End: 2018-11-08

## 2018-11-08 RX ORDER — METRONIDAZOLE 500 MG/1
500 TABLET ORAL EVERY 12 HOURS
Status: DISCONTINUED | OUTPATIENT
Start: 2018-11-08 | End: 2018-11-08 | Stop reason: HOSPADM

## 2018-11-08 RX ORDER — CLOPIDOGREL BISULFATE 75 MG/1
75 TABLET ORAL DAILY
Qty: 30 TABLET | Refills: 1 | Status: SHIPPED | OUTPATIENT
Start: 2018-11-09 | End: 2019-11-09

## 2018-11-08 RX ORDER — BUTALBITAL, ACETAMINOPHEN AND CAFFEINE 50; 325; 40 MG/1; MG/1; MG/1
1 TABLET ORAL EVERY 4 HOURS PRN
Start: 2018-11-08 | End: 2018-12-08

## 2018-11-08 RX ORDER — HEPARIN SODIUM 1000 [USP'U]/ML
INJECTION, SOLUTION INTRAVENOUS; SUBCUTANEOUS CODE/TRAUMA/SEDATION MEDICATION
Status: COMPLETED | OUTPATIENT
Start: 2018-11-08 | End: 2018-11-08

## 2018-11-08 RX ORDER — MIDAZOLAM HYDROCHLORIDE 1 MG/ML
INJECTION INTRAMUSCULAR; INTRAVENOUS CODE/TRAUMA/SEDATION MEDICATION
Status: COMPLETED | OUTPATIENT
Start: 2018-11-08 | End: 2018-11-08

## 2018-11-08 RX ORDER — FENTANYL CITRATE 50 UG/ML
INJECTION, SOLUTION INTRAMUSCULAR; INTRAVENOUS CODE/TRAUMA/SEDATION MEDICATION
Status: COMPLETED | OUTPATIENT
Start: 2018-11-08 | End: 2018-11-08

## 2018-11-08 RX ADMIN — ENOXAPARIN SODIUM 40 MG: 100 INJECTION SUBCUTANEOUS at 08:11

## 2018-11-08 RX ADMIN — MIDAZOLAM HYDROCHLORIDE 0.5 MG: 1 INJECTION, SOLUTION INTRAMUSCULAR; INTRAVENOUS at 01:11

## 2018-11-08 RX ADMIN — LISINOPRIL 40 MG: 20 TABLET ORAL at 08:11

## 2018-11-08 RX ADMIN — FENTANYL CITRATE 50 MCG: 50 INJECTION, SOLUTION INTRAMUSCULAR; INTRAVENOUS at 01:11

## 2018-11-08 RX ADMIN — HYDROCODONE BITARTRATE AND ACETAMINOPHEN 1 TABLET: 5; 325 TABLET ORAL at 05:11

## 2018-11-08 RX ADMIN — IODIXANOL 150 ML: 320 INJECTION, SOLUTION INTRAVASCULAR at 02:11

## 2018-11-08 RX ADMIN — ATORVASTATIN CALCIUM 40 MG: 20 TABLET, FILM COATED ORAL at 04:11

## 2018-11-08 RX ADMIN — FENTANYL CITRATE 50 MCG: 50 INJECTION, SOLUTION INTRAMUSCULAR; INTRAVENOUS at 12:11

## 2018-11-08 RX ADMIN — MIDAZOLAM HYDROCHLORIDE 1 MG: 1 INJECTION, SOLUTION INTRAMUSCULAR; INTRAVENOUS at 12:11

## 2018-11-08 RX ADMIN — Medication 1 CAPSULE: at 09:11

## 2018-11-08 RX ADMIN — Medication 3 ML: at 05:11

## 2018-11-08 RX ADMIN — METRONIDAZOLE 500 MG: 500 TABLET ORAL at 08:11

## 2018-11-08 RX ADMIN — FENTANYL CITRATE 25 MCG: 50 INJECTION, SOLUTION INTRAMUSCULAR; INTRAVENOUS at 01:11

## 2018-11-08 RX ADMIN — CLOPIDOGREL 75 MG: 75 TABLET, FILM COATED ORAL at 08:11

## 2018-11-08 RX ADMIN — MIDAZOLAM HYDROCHLORIDE 1 MG: 1 INJECTION, SOLUTION INTRAMUSCULAR; INTRAVENOUS at 02:11

## 2018-11-08 RX ADMIN — HYDROCODONE BITARTRATE AND ACETAMINOPHEN 1 TABLET: 5; 325 TABLET ORAL at 04:11

## 2018-11-08 RX ADMIN — FENTANYL CITRATE 50 MCG: 50 INJECTION, SOLUTION INTRAMUSCULAR; INTRAVENOUS at 02:11

## 2018-11-08 RX ADMIN — ASPIRIN 325 MG ORAL TABLET 325 MG: 325 PILL ORAL at 08:11

## 2018-11-08 RX ADMIN — MIDAZOLAM HYDROCHLORIDE 1 MG: 1 INJECTION, SOLUTION INTRAMUSCULAR; INTRAVENOUS at 01:11

## 2018-11-08 RX ADMIN — Medication 3 ML: at 04:11

## 2018-11-08 RX ADMIN — HEPARIN SODIUM 5000 UNITS: 1000 INJECTION INTRAVENOUS; SUBCUTANEOUS at 01:11

## 2018-11-08 RX ADMIN — ACETAZOLAMIDE 500 MG: 500 CAPSULE, EXTENDED RELEASE ORAL at 09:11

## 2018-11-08 RX ADMIN — DOCUSATE SODIUM 100 MG: 100 CAPSULE, LIQUID FILLED ORAL at 08:11

## 2018-11-08 NOTE — TELEPHONE ENCOUNTER
Spoke with pt. Pt wanted to speak with Dr. Kitchen because she wanted to let her know that she was in the hospital pt is at the [facility]. Pt stated she was told she had a stroke during her surgery that Dr. Kitchen did and that is why her vision was changed. Pt informed that I would route a message to Dr. Kitchen to let her know.

## 2018-11-08 NOTE — PROGRESS NOTES
[facility]  Vascular Neurology  Comprehensive Stroke Center  Progress Note    Assessment/Plan:     Right homonymous hemianopsia    Seen in ophthalmology clinic today   Concern for stroke  Pending MRI to compare with prior.   Will admit to team - ddx includes venous infarct, ischemic stroke, and idiopathic intercranial edema   angiogram tomorrow     Antiplatelets: asa and plavix   Statins: LDL - 84 start atorvastatin   SBP normotensive  DVT ppx: lovenox    PT/OT and speech to evaluate and treat  Neuro checks q4h  Pending -  IR angiogram - pending results        Essential hypertension    Stroke risk factor        Class 3 severe obesity due to excess calories with serious comorbidity and body mass index (BMI) of 50.0 to 59.9 in adult    Dietary consult recommended  Stroke risk factor      S/P EVETTE (total abdominal hysterectomy)/BS/left oophorectomy    Surgery 10/29/18   For abnormal uterine bleeding, on percocet and ibuprofen for pain      Papilledema associated with increased intracranial pressure    Being followed by Dr Colvin. MRI and MRV prior reviewed   Noted to have bilateral right sided blurry vision today   Sent to the ED for stroke work up           11/7/18 - MRI with changes to suggest stroke vs RCVS. Visual field cut remains, plan for IR angiogram tomorrow, and potentially later an LP   11/8/18- angio delayed for emergency case, plan for angio today. Patient with some continued visual deficit     STROKE DOCUMENTATION        NIH Scale:  1a. Level Of Consciousness: 0-->Alert: keenly responsive  1b. LOC Questions: 0-->Answers both questions correctly  1c. LOC Commands: 0-->Performs both tasks correctly  2. Best Gaze: 0-->Normal  3. Visual: 2-->Complete hemianopia  4. Facial Palsy: 0-->Normal symmetrical movements  5a. Motor Arm, Left: 0-->No drift: limb holds 90 (or 45) degrees for full 10 secs  5b. Motor Arm, Right: 0-->No drift: limb holds 90 (or 45) degrees for full 10 secs  6a. Motor Leg,  Left: 0-->No drift: leg holds 30 degree position for full 5 secs  6b. Motor Leg, Right: 0-->No drift: leg holds 30 degree position for full 5 secs  7. Limb Ataxia: 0-->Absent  8. Sensory: 0-->Normal: no sensory loss  9. Best Language: 0-->No aphasia: normal  10. Dysarthria: 0-->Normal  11. Extinction and Inattention (formerly Neglect): 0-->No abnormality  Total (NIH Stroke Scale): 2       Modified Roni Score: 1  Jw Coma Scale:    ABCD2 Score:    [id number] Score:   HAS -BLED Score:   ICH Score:   Hunt & Mejia Classification:      Hemorrhagic change of an Ischemic Stroke: Does this patient have an ischemic stroke with hemorrhagic changes? No     Neurologic Chief Complaint: rcvs vs stroke     Subjective:     Interval History: Patient is seen for follow-up neurological assessment and treatment recommendations:  angio delayed for emergency case, plan for angio today. Patient with some continued visual deficit     HPI, Past Medical, Family, and Social History remains the same as documented in the initial encounter.     Review of Systems   Constitutional: Negative for diaphoresis.   Eyes: Positive for visual disturbance.   Respiratory: Negative for shortness of breath.    Gastrointestinal: Positive for abdominal pain (from  hysterectomy pain).   Skin: Positive for wound.   Neurological: Negative for facial asymmetry, weakness and numbness.     Scheduled Meds:   acetaZOLAMIDE  500 mg Oral BID    aspirin  325 mg Oral Daily    atorvastatin  40 mg Oral Daily    clopidogrel  75 mg Oral Daily    docusate sodium  100 mg Oral BID    enoxaparin  40 mg Subcutaneous Q12H    iron polysaccharides  1 capsule Oral Daily    lisinopril  40 mg Oral Daily    metroNIDAZOLE  500 mg Oral Q12H    prochlorperazine  10 mg Intravenous Once    sodium chloride 0.9%  3 mL Intravenous Q8H     Continuous Infusions:   sodium chloride 0.9%       PRN Meds:hydrALAZINE, HYDROcodone-acetaminophen, influenza, sodium chloride 0.9%, sodium  chloride 0.9%    Objective:     Vital Signs (Most Recent):  Temp: 97.8 °F (36.6 °C) (11/08/18 1121)  Pulse: 72 (11/08/18 1420)  Resp: 12 (11/08/18 1420)  BP: (!) 175/82 (11/08/18 1420)  SpO2: 100 % (11/08/18 1420)  BP Location: Right arm    Vital Signs Range (Last 24H):  Temp:  [97.2 °F (36.2 °C)-98.7 °F (37.1 °C)]   Pulse:  [61-85]   Resp:  [8-18]   BP: (121-182)/(55-84)   SpO2:  [94 %-100 %]   BP Location: Right arm    Physical Exam   Constitutional: She is oriented to person, place, and time. She appears well-developed and well-nourished.   HENT:   Head: Normocephalic and atraumatic.   Cardiovascular: Normal rate.   Pulmonary/Chest: Effort normal.   Neurological: She is alert and oriented to person, place, and time.   Skin: Skin is warm and dry.   Nursing note and vitals reviewed.      Neurological Exam:   LOC: alert  Language: No aphasia  Visual Fields: right hemianopsia   EOM (CN III, IV, VI): Full/intact  Facial Movement (CN VII): Symmetric facial expression    Motor: Arm left  Normal 5/5  Leg left  Normal 5/5  Arm right  Normal 5/5  Leg right Normal 5/5  Sensation: Intact to light touch, temperature and vibration  Tone: Normal tone throughout    Laboratory:  CMP:   Recent Labs   Lab 11/08/18  0612   CALCIUM 8.6*   ALBUMIN 2.8*   PROT 6.8      K 3.9   CO2 24      BUN 15   CREATININE 0.8   ALKPHOS 91   ALT 12   AST 15   BILITOT 0.1     CBC:   Recent Labs   Lab 11/08/18  0613   WBC 7.33   RBC 3.64*   HGB 7.6*   HCT 26.6*   *   MCV 73*   MCH 20.9*   MCHC 28.6*     Lipid Panel:   Recent Labs   Lab 11/06/18  1731   CHOL 150   LDLCALC 84.0   HDL 36*   TRIG 150     Coagulation:   Recent Labs   Lab 11/07/18  0508   INR 0.9   APTT 21.9     Platelet Aggregation Study: No results for input(s): PLTAGG, PLTAGINTERP, PLTAGREGLACO, ADPPLTAGGREG in the last 168 hours.  Hgb A1C: No results for input(s): HGBA1C in the last 168 hours.  TSH:   Recent Labs   Lab 11/06/18  1731   TSH 0.840       Diagnostic  Results     CT head   No acute intracranial abnormalities  Empty sella configuration, unchanged from MRI.    MRI brain   Interval development of increased T2/FLAIR signal in the left occipital lobe since the prior examination. No associated mass effect.  There is T2 shine through signal on the diffusion sequence in this region without ADC match.  Finding represents nonspecific edema of uncertain etiology.    Remainder of the exam appears unchanged from prior.    TTE   · Left ventricle ejection fraction is normal at 55%  · Left atrium is mildly dilated.  · The estimated PA systolic pressure is 20 mm Hg  · Normal central venous pressure (3 mm Hg).  · LA mildly enlarged            STONE Call  Comprehensive Stroke Center  Department of Vascular Neurology   [facility]

## 2018-11-08 NOTE — PROGRESS NOTES
Pt arrived to [facility] bay 4, report received from Nikita. Dressing to bilateral groin CDI. Neuro intact, will continue to monitor.

## 2018-11-08 NOTE — ASSESSMENT & PLAN NOTE
Seen in ophthalmology clinic today   Concern for stroke  Pending MRI to compare with prior.   Will admit to team - ddx includes venous infarct, ischemic stroke, and idiopathic intercranial edema   angiogram tomorrow     Antiplatelets: asa and plavix   Statins: LDL - 84 start atorvastatin   SBP normotensive  DVT ppx: lovenox    PT/OT and speech to evaluate and treat  Neuro checks q4h  Pending -  IR angiogram - pending results

## 2018-11-08 NOTE — SEDATION DOCUMENTATION
Superior Sagittal Sinus  13  Left Transverse  12  Left Transverse Sigmiod Junction  12  Left Sigmoid  6  Left IJ  7

## 2018-11-08 NOTE — H&P
Inpatient Radiology Pre-procedure Note    History of Present Illness:  Bijal Molina is a 38 y.o. female with a history of right vision blurriness who presents for cerebral angiogram.    Admission H&P reviewed.  Past Medical History:   Diagnosis Date    Anemia     Elevated intracranial pressure     Hypertension     Migraines     Obesity     Obesity      Past Surgical History:   Procedure Laterality Date     SECTION, LOW TRANSVERSE      x3    HYSTERECTOMY, TOTAL, ABDOMINAL N/A 10/29/2018    Performed by Margaret Alexis MD at [facility] OR    SALPINGECTOMY Bilateral 10/29/2018    Procedure: SALPINGECTOMY;  Surgeon: Margaret Alexis MD;  Location: [facility] OR;  Service: OB/GYN;  Laterality: Bilateral;    SALPINGECTOMY Bilateral 10/29/2018    Performed by Margaret Alexis MD at [facility] OR    TOTAL ABDOMINAL HYSTERECTOMY N/A 10/29/2018    Procedure: HYSTERECTOMY, TOTAL, ABDOMINAL;  Surgeon: Margaret Alexis MD;  Location: [facility] OR;  Service: OB/GYN;  Laterality: N/A;  RN PREOP 10/22/2018-----BMI--55.2---  T&M FOR 2UNITS ORDERED----UPT    TUBAL LIGATION         Review of Systems:   As documented in primary team H&P    Home Meds:   Prior to Admission medications    Medication Sig Start Date End Date Taking? Authorizing Provider   acetaZOLAMIDE (DIAMOX) 500 mg CpSR Take 1 capsule (500 mg total) by mouth 2 (two) times daily. 10/3/18   Dane Colvin MD   butalbital-acetaminophen-caffeine -40 mg (FIORICET, ESGIC) -40 mg per tablet Take 1 tablet by mouth every 4 (four) hours as needed for Pain. 10/10/18 11/9/18  Misbah Oshea MD   docusate sodium (COLACE) 100 MG capsule Take 1 capsule (100 mg total) by mouth 2 (two) times daily. 18   Margaret Alexis MD   HYDROcodone-acetaminophen (NORCO) 5-325 mg per tablet Take 1 tablet by mouth every 4 (four) hours as needed. 18   Margaret Alexis MD   iron polysaccharide complex, vit c-sa (FERREX 150  PLUS) 150-50-50 mg Cap capsule Take 1 capsule by mouth once daily. 11/2/18   Margaret Alexis MD   lisinopril (PRINIVIL,ZESTRIL) 40 MG tablet Take 40 mg by mouth once daily.  10/10/18   Historical Provider, MD   metroNIDAZOLE (FLAGYL) 500 MG tablet Take 1 tablet (500 mg total) by mouth every 12 (twelve) hours. for 7 days 11/1/18 11/8/18  Margaret Alexis MD     Scheduled Meds:    acetaZOLAMIDE  500 mg Oral BID    aspirin  325 mg Oral Daily    clopidogrel  75 mg Oral Daily    docusate sodium  100 mg Oral BID    enoxaparin  40 mg Subcutaneous Q12H    iron polysaccharides  1 capsule Oral Daily    lisinopril  40 mg Oral Daily    metroNIDAZOLE  500 mg Oral Q12H    prochlorperazine  10 mg Intravenous Once    sodium chloride 0.9%  3 mL Intravenous Q8H     Continuous Infusions:    sodium chloride 0.9%       PRN Meds:hydrALAZINE, HYDROcodone-acetaminophen, influenza, sodium chloride 0.9%  Anticoagulants/Antiplatelets: aspirin 325mg QD (last dose @08:43 on 11/8/2018), plavix 75mg QD (last dose @08:43 on 11/8/2018), lovenox 40mg Q12H (last dose @08:43 on 11/8/2018)    Allergies:   Review of patient's allergies indicates:   Allergen Reactions    Depo-provera contraceptive Blisters     Sedation Hx: have not been any systemic reactions    Labs:  Recent Labs   Lab 11/07/18  0508   INR 0.9       Recent Labs   Lab 11/08/18  0613   WBC 7.33   HGB 7.6*   HCT 26.6*   MCV 73*   *      Recent Labs   Lab 11/07/18  0508 11/08/18  0612    107    138   K 3.8 3.9    108   CO2 23 24   BUN 14 15   CREATININE 0.7 0.8   CALCIUM 8.6* 8.6*   MG 1.9  --    ALT 10 12   AST 14 15   ALBUMIN 2.8* 2.8*   BILITOT 0.2 0.1         Vitals:  Temp: 97.8 °F (36.6 °C) (11/08/18 1121)  Pulse: 80 (11/08/18 1121)  Resp: 18 (11/08/18 1121)  BP: (!) 145/65 (11/08/18 1121)  SpO2: 97 % (11/08/18 1121)     Physical Exam:  ASA: III  Mallampati: III    General: no acute distress  Mental Status: alert and oriented to  person, place and time  HEENT: normocephalic, atraumatic  Chest: unlabored breathing  Abdomen: nondistended  Extremity: moves all extremities    Plan: Cerebral angiogram and possible venogram with manometry of the dural venous sinuses. Informed consent obtained.  Sedation Plan: Moderate sedation.    Bryan Young MD  PGY-IV  Dept of Radiology   Pager: [phone]

## 2018-11-08 NOTE — SUBJECTIVE & OBJECTIVE
Neurologic Chief Complaint: rcvs vs stroke     Subjective:     Interval History: Patient is seen for follow-up neurological assessment and treatment recommendations:  angio delayed for emergency case, plan for angio today. Patient with some continued visual deficit     HPI, Past Medical, Family, and Social History remains the same as documented in the initial encounter.     Review of Systems   Constitutional: Negative for diaphoresis.   Eyes: Positive for visual disturbance.   Respiratory: Negative for shortness of breath.    Gastrointestinal: Positive for abdominal pain (from  hysterectomy pain).   Skin: Positive for wound.   Neurological: Negative for facial asymmetry, weakness and numbness.     Scheduled Meds:   acetaZOLAMIDE  500 mg Oral BID    aspirin  325 mg Oral Daily    atorvastatin  40 mg Oral Daily    clopidogrel  75 mg Oral Daily    docusate sodium  100 mg Oral BID    enoxaparin  40 mg Subcutaneous Q12H    iron polysaccharides  1 capsule Oral Daily    lisinopril  40 mg Oral Daily    metroNIDAZOLE  500 mg Oral Q12H    prochlorperazine  10 mg Intravenous Once    sodium chloride 0.9%  3 mL Intravenous Q8H     Continuous Infusions:   sodium chloride 0.9%       PRN Meds:hydrALAZINE, HYDROcodone-acetaminophen, influenza, sodium chloride 0.9%, sodium chloride 0.9%    Objective:     Vital Signs (Most Recent):  Temp: 97.8 °F (36.6 °C) (11/08/18 1121)  Pulse: 72 (11/08/18 1420)  Resp: 12 (11/08/18 1420)  BP: (!) 175/82 (11/08/18 1420)  SpO2: 100 % (11/08/18 1420)  BP Location: Right arm    Vital Signs Range (Last 24H):  Temp:  [97.2 °F (36.2 °C)-98.7 °F (37.1 °C)]   Pulse:  [61-85]   Resp:  [8-18]   BP: (121-182)/(55-84)   SpO2:  [94 %-100 %]   BP Location: Right arm    Physical Exam   Constitutional: She is oriented to person, place, and time. She appears well-developed and well-nourished.   HENT:   Head: Normocephalic and atraumatic.   Cardiovascular: Normal rate.   Pulmonary/Chest: Effort normal.    Neurological: She is alert and oriented to person, place, and time.   Skin: Skin is warm and dry.   Nursing note and vitals reviewed.      Neurological Exam:   LOC: alert  Language: No aphasia  Visual Fields: right hemianopsia   EOM (CN III, IV, VI): Full/intact  Facial Movement (CN VII): Symmetric facial expression    Motor: Arm left  Normal 5/5  Leg left  Normal 5/5  Arm right  Normal 5/5  Leg right Normal 5/5  Sensation: Intact to light touch, temperature and vibration  Tone: Normal tone throughout    Laboratory:  CMP:   Recent Labs   Lab 11/08/18  0612   CALCIUM 8.6*   ALBUMIN 2.8*   PROT 6.8      K 3.9   CO2 24      BUN 15   CREATININE 0.8   ALKPHOS 91   ALT 12   AST 15   BILITOT 0.1     CBC:   Recent Labs   Lab 11/08/18  0613   WBC 7.33   RBC 3.64*   HGB 7.6*   HCT 26.6*   *   MCV 73*   MCH 20.9*   MCHC 28.6*     Lipid Panel:   Recent Labs   Lab 11/06/18  1731   CHOL 150   LDLCALC 84.0   HDL 36*   TRIG 150     Coagulation:   Recent Labs   Lab 11/07/18  0508   INR 0.9   APTT 21.9     Platelet Aggregation Study: No results for input(s): PLTAGG, PLTAGINTERP, PLTAGREGLACO, ADPPLTAGGREG in the last 168 hours.  Hgb A1C: No results for input(s): HGBA1C in the last 168 hours.  TSH:   Recent Labs   Lab 11/06/18  1731   TSH 0.840       Diagnostic Results     CT head   No acute intracranial abnormalities  Empty sella configuration, unchanged from MRI.    MRI brain   Interval development of increased T2/FLAIR signal in the left occipital lobe since the prior examination. No associated mass effect.  There is T2 shine through signal on the diffusion sequence in this region without ADC match.  Finding represents nonspecific edema of uncertain etiology.    Remainder of the exam appears unchanged from prior.    TTE   · Left ventricle ejection fraction is normal at 55%  · Left atrium is mildly dilated.  · The estimated PA systolic pressure is 20 mm Hg  · Normal central venous pressure (3 mm Hg).  · LA  mildly enlarged

## 2018-11-08 NOTE — PROGRESS NOTES
Pt returned to unit.  Transferred back to bed without incident.  As per instructions from recovery right leg remained straigtened and able to bend left left leg.  Dressings to right/left groin areas intact with no active bleeding noted.  +2 pedal pulses noted bilaterally.  No c/o pain.  VSS.

## 2018-11-08 NOTE — SEDATION DOCUMENTATION
Superior Sagittal (#2)  11  Right transverse  10  Right transverse sigmoid junction  9  Right Sigmoid  6  Right IJ  15

## 2018-11-08 NOTE — PROCEDURES
Radiology Post-Procedure Note    Pre Op Diagnosis: Concern for Stroke, IIH    Post Op Diagnosis: same    Procedure: Cerebral angiogram venogram with manometry    Procedure performed by: aMrshall Sepulveda MD    Written Informed Consent Obtained: Yes    Specimen Removed: NO    Estimated Blood Loss: less than 50     Procedure report:     A 5F sheath was placed into the right femoral artery and left common femoral vein a 5F Mikhail catheter was advanced into the aortic arch.  The bilateral CCA, ECA, ICA and vertebral arteries were subselected and angiography of the brain was performed after injection into each of these vessels.    The bilateral IJ, sigmoid transverse and superior sagittal sinuses were selected with a microcatheter and pressures were obtained.    Preliminary interpretation: Cerebral angiogram demonstrated no evidence of significant stenosis, occlusion or vasculitis.     There was mild narrowing at the transverse sigmoid junctions bilaterally.     The mean pressures in superior sagittal sinus was 11-13.    Left:  Left Transverse was 12, left transverse sigmoid junction 12, left sigmoid was 6 and left IJ was 7.    Right: Right transverse was 10, right transverse sigmoid junction 9, Right sigmoid 6 and right IJ 15.         Please see Imaging report for full details.    A right femoral artery angiogram was performed, the sheath removed and hemostasis achieved using exoseal device and left common femoral vein with manual pressure.  No hematoma was present at the time of hemostasis.    The patient tolerated the procedure well.     Marshall Sepulveda MD  Department of Radiology  Pager: [phone]

## 2018-11-09 ENCOUNTER — TELEPHONE (OUTPATIENT)
Dept: OBSTETRICS AND GYNECOLOGY | Facility: CLINIC | Age: 38
End: 2018-11-09

## 2018-11-09 NOTE — PLAN OF CARE
11/09/18 0803   Final Note   Assessment Type Final Discharge Note   Anticipated Discharge Disposition Home   Right Care Referral Info   Post Acute Recommendation No Care

## 2018-11-09 NOTE — TELEPHONE ENCOUNTER
Called patient to discuss stroke during surgery. Was on the phone with patient for over 30 minutes explaining that the reason she went for testing before surgery was me taking every precaution that we could to prevent as much as we could but nothing is 100%. She agreed. She admits that Neurologist could not find a reason for the stroke. She has no motor deficits. She reports that she just has visual deficts. She has normal blurriness but it worsened after surgery. It wasn't until her Ophthalmology appointment with testing and consult from the Neurologist that she was sent to the ED for a MRI that they saw changes in the MRI that the stroke was diagnosed. She is in good spirits and had just finished a crossword puzzle. Patient is doing well.     Margaret Kitchen MD

## 2018-11-09 NOTE — ASSESSMENT & PLAN NOTE
Antiplatelets: single agent plavix on dc   LDL 84- start atorvastatin   SBP normotensive  DVT ppx: lovenox    PT/OT and speech to evaluate and treat - only needs OT driving eval   Neuro checks q4h    Multiple stroke risk factors   Recent surgery, obesity, htn, increased intercranial prssure

## 2018-11-09 NOTE — ASSESSMENT & PLAN NOTE
No driving until released via occupational driving test  Discussed with patient many times and she stated understanding   All questions answered  Referral placed  Per OT note, she did well while ambulating in a controlled situation, no further PT or speech needs

## 2018-11-09 NOTE — ASSESSMENT & PLAN NOTE
Being followed by Dr Colvin. MRI and MRV prior reviewed   Noted to have bilateral right sided blurry vision today   Sent to the ED for stroke work up     Needs outpatient LP

## 2018-11-09 NOTE — PROGRESS NOTES
Pt up in w/c with all personal belongings leaving unit for discharge.  Awake and alert.  No c/o pain.

## 2018-11-09 NOTE — DISCHARGE SUMMARY
[facility]  Vascular Neurology  Comprehensive Stroke Center  Discharge Summary     Summary:     Admit Date: 11/6/2018  4:53 PM    Discharge Date and Time: 11/8/18    Attending Physician: Vipul Lucas DO     Discharge Provider: STONE Call    History of Present Illness: 38 year old female presents to the ED for evaluation of right sided vision field blurriness from ophthalmology clinic. The patient with history of multiple eye issues over the last month for which she was seeing Dr Colvin for. She was found on 10/3 to have increased edema on the ocular disc which prompted MRV and MRI. They did not see a mass lesion but was concerned for venous stenosis. The plan was to refer her to Dr Mccabe for potential stent for idiopathic intercranial edema.     The patient reports symptoms of right sided bilateral blurriness with headache x 1 week after waking up from surgery. The patient reports she did not seek medical attention sooner as she knew she had a follow up with Dr Colvin today. She denies any weakness, speech changes, confusion, or numbness outside of generalized weakness post hysterectomy 1 week ago.   Symptoms have been constant.   No attempted treatment prior to arrival and no identifiable aggravators      Hospital Course (synopsis of major diagnoses, care, treatment, and services provided during the course of the hospital stay): 11/7/18 - MRI with changes to suggest stroke vs RCVS. Visual field cut remains, plan for IR angiogram tomorrow, and potentially later an LP   11/8/18- angio delayed for emergency case, plan for angio today. Patient with some continued visual deficit     On multiple occasions, the patient expressed her irritation with being NPO for her test. Despite multiple attempts at explaining this to her, she was often appearing disgruntled and short in conversations with staff. I assured her that the NPO status was for her safety and that once she returned, our staff  would do all we could to get her prompt meal trays.     Patient with no findings on angio that would benefit from stent placement   Patient with known increased intercranial pressure, will likely need outpatient LP - continue diamox  The patient with occipital lesion concerning for stroke which correlates with R homonomous hemianopsia    Etiology unknown at this time - mildly enlarged LA (30 day event monitor ordered), also with significant risk factors - obesity (BMI 56), hypertension, diet  The patient is to follow up with outpatient therapy as she is not safe for driving due to hemianopsia, I have stressed that she will need a driving eval multiple times and advised her she may schedule it whenever she would like, but that I cannot release her to drive at this time.     Patient stated understanding of above information and all questions were answered.    Inpatient acute stroke work up completed and patient is stable for discharge.  Please see imaging and discharge medication list below.      Stroke Etiology: Undetermined Cause. Unclassified due to presence of more than or equal to 1 Probable Major Cause (CE (cardio-aortic embolism))/recent surgery    STROKE DOCUMENTATION         NIH Scale:  1a. Level Of Consciousness: 0-->Alert: keenly responsive  1b. LOC Questions: 0-->Answers both questions correctly  1c. LOC Commands: 0-->Performs both tasks correctly  2. Best Gaze: 0-->Normal  3. Visual: 2-->Complete hemianopia  4. Facial Palsy: 0-->Normal symmetrical movements  5a. Motor Arm, Left: 0-->No drift: limb holds 90 (or 45) degrees for full 10 secs  5b. Motor Arm, Right: 0-->No drift: limb holds 90 (or 45) degrees for full 10 secs  6a. Motor Leg, Left: 0-->No drift: leg holds 30 degree position for full 5 secs  6b. Motor Leg, Right: 0-->No drift: leg holds 30 degree position for full 5 secs  7. Limb Ataxia: 0-->Absent  8. Sensory: 0-->Normal: no sensory loss  9. Best Language: 0-->No aphasia: normal  10.  Dysarthria: 0-->Normal  11. Extinction and Inattention (formerly Neglect): 0-->No abnormality  Total (NIH Stroke Scale): 2        Modified Roni Score: 2  [place] Coma Scale:    ABCD2 Score:    [id number] Score:   HAS -BLED Score:   ICH Score:   Hunt & Mejia Classification:       Assessment/Plan:     Diagnostic Results:  CT head   No acute intracranial abnormalities  Empty sella configuration, unchanged from MRI.     MRI brain   Interval development of increased T2/FLAIR signal in the left occipital lobe since the prior examination. No associated mass effect.  There is T2 shine through signal on the diffusion sequence in this region without ADC match.  Finding represents nonspecific edema of uncertain etiology.    Remainder of the exam appears unchanged from prior.     TTE   · Left ventricle ejection fraction is normal at 55%  · Left atrium is mildly dilated.  · The estimated PA systolic pressure is 20 mm Hg  · Normal central venous pressure (3 mm Hg).  · LA mildly enlarged       IR angiogram 11/8/18  Cerebral angiogram demonstrated no evidence of significant stenosis, occlusion or vasculitis.      There was mild narrowing at the transverse sigmoid junctions bilaterally.      The mean pressures in superior sagittal sinus was 11-13.     Left:  Left Transverse was 12, left transverse sigmoid junction 12, left sigmoid was 6 and left IJ was 7.     Right: Right transverse was 10, right transverse sigmoid junction 9, Right sigmoid 6 and right IJ 15.            Interventions: Diagnostic Angiography    Complications: None    Disposition: Home or Self Care    Final Active Diagnoses:    Diagnosis Date Noted POA    PRINCIPAL PROBLEM:  Acute ischemic left PCA stroke [I63.532] 11/06/2018 Yes    Occipital stroke [I63.9] 11/08/2018 Unknown    Right homonymous hemianopsia [H53.461] 11/06/2018 Yes    Left atrial enlargement [I51.7] 11/08/2018 Unknown    Class 3 severe obesity due to excess calories with serious comorbidity  and body mass index (BMI) of 50.0 to 59.9 in adult [E66.01, Z68.43] 11/07/2018 Not Applicable    S/P EVETTE (total abdominal hysterectomy)/BS/left oophorectomy [Z90.710] 10/29/2018 Yes    Papilledema associated with increased intracranial pressure [H47.11] 10/03/2018 Yes      Problems Resolved During this Admission:     * Acute ischemic left PCA stroke        Antiplatelets: single agent plavix on dc   LDL 84- start atorvastatin   SBP normotensive  DVT ppx: lovenox    PT/OT and speech to evaluate and treat - only needs OT driving eval   Neuro checks q4h    Multiple stroke risk factors   Recent surgery, obesity, htn, increased intercranial prssure            Occipital stroke    See above        Right homonymous hemianopsia    No driving until released via occupational driving test  Discussed with patient many times and she stated understanding   All questions answered  Referral placed  Per OT note, she did well while ambulating in a controlled situation, no further PT or speech needs       Left atrial enlargement    Noted on echo during admission,30 day event monitor      Class 3 severe obesity due to excess calories with serious comorbidity and body mass index (BMI) of 50.0 to 59.9 in adult    Dietary consult recommended  Stroke risk factor      Papilledema associated with increased intracranial pressure    Being followed by Dr Colvin. MRI and MRV prior reviewed   Noted to have bilateral right sided blurry vision today   Sent to the ED for stroke work up     Needs outpatient LP          Recommendations:     Post-discharge complication risks: None    Stroke Education given to: patient    Follow-up in Stroke Clinic in 4-6 weeks  pcp in one week     Discharge Plan:  Antithrombotics: Clopidogrel 75mg  Statin: Atorvastatin 40mg  Aggresive risk factor modification:  Hypertension  High Cholesterol  Diet  Exercise  Obesity    Follow Up:  Follow-up Information     Misbah Oshea MD. Schedule an appointment as soon as  possible for a visit in 1 week.    Specialty:  Internal Medicine  Contact information:  [place] [id number]  [phone]             [facility] VASCULAR NEUROLOGY. Schedule an appointment as soon as possible for a visit in 4 weeks.    Specialty:  Vascular Neurology  Contact information:  [address]  [facility] [address]  [phone]                 Patient Instructions:      Referral to Outpatient Occupational therapy   Referral Priority: Routine Referral Type: Occupational Therapy   Referral Reason: Specialty Services Required   Requested Specialty: Occupational Therapy   Number of Visits Requested: 1     No driving until:   Order Comments: No driving until released with driving eval   Scheduling Instructions: No driving until released with driving eval       Medications:  Reconciled Home Medications:      Medication List      START taking these medications    atorvastatin 40 MG tablet  Commonly known as:  LIPITOR  Take 1 tablet (40 mg total) by mouth once daily.  Start taking on:  11/9/2018     clopidogrel 75 mg tablet  Commonly known as:  PLAVIX  Take 1 tablet (75 mg total) by mouth once daily.  Start taking on:  11/9/2018        CHANGE how you take these medications    butalbital-acetaminophen-caffeine -40 mg -40 mg per tablet  Commonly known as:  FIORICET, ESGIC  Take 1 tablet by mouth every 4 (four) hours as needed for Pain. CONTINUE TO TAKE AS DIRECTED BY YOUR PRESCRIBING DOCTOR. NO NEW RX BEING SENT.  What changed:  additional instructions        CONTINUE taking these medications    acetaZOLAMIDE 500 mg Cpsr  Commonly known as:  DIAMOX  Take 1 capsule (500 mg total) by mouth 2 (two) times daily.     docusate sodium 100 MG capsule  Commonly known as:  COLACE  Take 1 capsule (100 mg total) by mouth 2 (two) times daily.     HYDROcodone-acetaminophen 5-325 mg per tablet  Commonly known as:  NORCO  Take 1 tablet by mouth every 4 (four) hours as needed.     iron  polysaccharide complex, vit c-sa 150-50-50 mg Cap capsule  Commonly known as:  ferrex 150 plus  Take 1 capsule by mouth once daily.     lisinopril 40 MG tablet  Commonly known as:  PRINIVIL,ZESTRIL  Take 40 mg by mouth once daily.     metroNIDAZOLE 500 MG tablet  Commonly known as:  FLAGYL  Take 1 tablet (500 mg total) by mouth every 12 (twelve) hours. for 7 days        STOP taking these medications    ibuprofen 800 MG tablet  Commonly known as:  ADVROSIE ORONA PA  [facility] Stroke Center  Department of Vascular Neurology   [facility]

## 2018-11-12 ENCOUNTER — CLINICAL SUPPORT (OUTPATIENT)
Dept: CARDIOLOGY | Facility: HOSPITAL | Age: 38
End: 2018-11-12
Attending: PHYSICIAN ASSISTANT
Payer: COMMERCIAL

## 2018-11-12 PROCEDURE — 93272 ECG/REVIEW INTERPRET ONLY: CPT | Mod: ,,, | Performed by: INTERNAL MEDICINE

## 2018-11-12 PROCEDURE — 93271 ECG/MONITORING AND ANALYSIS: CPT

## 2018-11-20 ENCOUNTER — TELEPHONE (OUTPATIENT)
Dept: NEUROLOGY | Facility: CLINIC | Age: 38
End: 2018-11-20

## 2018-11-20 NOTE — TELEPHONE ENCOUNTER
----- Message from Nathaly Coronel RN sent at 11/9/2018  8:02 AM CST -----  Please schedule a neuro followup appt for 4-6 weeks. Patient was inpatient and seen by Dr Lucas. Please contact patient with date and time of appointment.

## 2018-11-21 ENCOUNTER — TELEPHONE (OUTPATIENT)
Dept: OBSTETRICS AND GYNECOLOGY | Facility: CLINIC | Age: 38
End: 2018-11-21

## 2018-11-21 NOTE — TELEPHONE ENCOUNTER
Called pt to R/S post op appt due to  surgery this day, pt said she started having hot flashes since the surgery can she be given something.

## 2018-11-27 ENCOUNTER — OFFICE VISIT (OUTPATIENT)
Dept: NEUROLOGY | Facility: CLINIC | Age: 38
End: 2018-11-27
Payer: COMMERCIAL

## 2018-11-27 VITALS — BODY MASS INDEX: 45.99 KG/M2 | WEIGHT: 293 LBS | HEIGHT: 67 IN

## 2018-11-27 DIAGNOSIS — G93.2 PSEUDOTUMOR CEREBRI SYNDROME: Primary | ICD-10-CM

## 2018-11-27 DIAGNOSIS — I63.532 ACUTE ISCHEMIC LEFT PCA STROKE: ICD-10-CM

## 2018-11-27 PROCEDURE — 99214 OFFICE O/P EST MOD 30 MIN: CPT | Mod: S$GLB,,, | Performed by: PSYCHIATRY & NEUROLOGY

## 2018-11-27 PROCEDURE — 99999 PR PBB SHADOW E&M-EST. PATIENT-LVL II: CPT | Mod: PBBFAC,,, | Performed by: PSYCHIATRY & NEUROLOGY

## 2018-11-27 NOTE — LETTER
November 27, 2018      Misbah Oshea MD  [address]  [place] [address]           [facility]  [facility]  [place] [address]  Phone: [phone]          Patient: Bijal Molina   MR Number: [id number]   YOB: 1980   Date of Visit: 11/27/2018       Dear Dr. Misbah Oshea:    Thank you for referring Bijal Molina to me for evaluation. Attached you will find relevant portions of my assessment and plan of care.    If you have questions, please do not hesitate to call me. I look forward to following Bijal Molina along with you.    Sincerely,    Neyr Palma MD    Enclosure  CC:  No Recipients    If you would like to receive this communication electronically, please contact [email or web address] or [phone] to request more information on [employer] access.    For providers and/or their staff who would like to refer a patient to [facility], please contact us through our one-stop-shop provider referral line, [facility], at [phone].    If you feel you have received this communication in error or would no longer like to receive these types of communications, please e-mail [email or web address]

## 2018-11-27 NOTE — PROGRESS NOTES
Subjective:       Patient ID: Bijal Molina is a 38 y.o. female.    Chief Complaint:  Stroke and Right homonymous hemianopsia      History of Present Illness  HPI  Stroke Follow-up  She presents for follow-up of a stroke. Event occurred 6 weeks ago. She has residual symptoms of loss of vision right. She denies numbness or tingling of right hand(s), numbness or tingling of right foot(feet). Overall she feels the condition is improved. Stroke risk factors include hyperlipidemia and hypertension. She also complains of none. She denies chest pain, palpitations, seizures and shortness of breath.       Review of Systems  Review of Systems   Constitutional: Positive for fatigue.   Eyes: Positive for visual disturbance.   Neurological: Positive for numbness and headaches.   All other systems reviewed and are negative.      Objective:      Neurologic Exam     Mental Status   Oriented to person, place, and time.   Registration: recalls 3 of 3 objects. Recall at 5 minutes: recalls 3 of 3 objects. Follows 3 step commands.   Attention: normal. Concentration: normal.   Speech: speech is normal   Level of consciousness: alert  Knowledge: good.   Able to name object. Able to read. Able to repeat. Able to write. Normal comprehension.     Cranial Nerves     CN II   Visual acuity: decreased  Right visual field deficit: upper temporal and lower temporal quadrant(s)  Left visual field deficit: upper nasal and lower nasal quadrant(s)    CN III, IV, VI   Pupils are equal, round, and reactive to light.  Extraocular motions are normal.     CN VIII   CN VIII normal.     CN IX, X   CN IX normal.     CN XI   CN XI normal.     CN XII   CN XII normal.     Motor Exam   Muscle bulk: normal  Right arm tone: normal  Left arm tone: normal    Sensory Exam   Light touch normal.   Vibration normal.     Gait, Coordination, and Reflexes     Gait  Gait: normal    Reflexes   Reflexes 2+ except as noted.       Physical Exam   Constitutional: She is  oriented to person, place, and time. She appears well-developed and well-nourished.   HENT:   Head: Normocephalic and atraumatic.   Eyes: EOM are normal. Pupils are equal, round, and reactive to light.   Neck: Normal range of motion.   Cardiovascular: Normal rate.   Pulmonary/Chest: Effort normal.   Neurological: She is alert and oriented to person, place, and time. A cranial nerve deficit is present. Gait normal.   Psychiatric: Her speech is normal.   Vitals reviewed.        Assessment:       Problem List Items Addressed This Visit     None      Visit Diagnoses     Pseudotumor cerebri syndrome    -  Primary    Relevant Orders    LUPUS ANTICOAGULANT (DRVVT)    Cardiolipin antibody    PROTEIN C ANTIGEN, TOTAL    HIGH SENSITIVITY CRP    C-REACTIVE PROTEIN    FACTOR 5 LEIDEN    PROTEIN S ANTIGEN, TOTAL    PROTEIN S FUNCTIONAL ACTIVITY    PROTEIN ELECTROPHORESIS, SERUM    CSF cell count with differential    Protein, CSF    Glucose, CSF    CSF culture    Lumbar puncture    IR Lumbar Puncture Therapeutic    EMG W/ ULTRASOUND AND NERVE CONDUCTION TEST 1 Extremity    VERO        Plan:

## 2018-11-28 ENCOUNTER — PATIENT MESSAGE (OUTPATIENT)
Dept: OBSTETRICS AND GYNECOLOGY | Facility: CLINIC | Age: 38
End: 2018-11-28

## 2018-11-28 ENCOUNTER — TELEPHONE (OUTPATIENT)
Dept: OBSTETRICS AND GYNECOLOGY | Facility: CLINIC | Age: 38
End: 2018-11-28

## 2018-11-28 DIAGNOSIS — D64.9 ANEMIA, UNSPECIFIED TYPE: Primary | ICD-10-CM

## 2018-11-28 NOTE — TELEPHONE ENCOUNTER
Spoke with pt. Pt only wants to talk to Dr. Kitchen. Pt informed I would route a message to Dr. Kitchen.

## 2018-11-28 NOTE — TELEPHONE ENCOUNTER
----- Message from Brandi Tong sent at 11/28/2018 12:32 PM CST -----  Contact: Self  Patient returned call to  Please call at [phone]

## 2018-11-29 ENCOUNTER — TELEPHONE (OUTPATIENT)
Dept: OBSTETRICS AND GYNECOLOGY | Facility: CLINIC | Age: 38
End: 2018-11-29

## 2018-11-29 NOTE — TELEPHONE ENCOUNTER
Called patient to check in with her. Still being followed by Neurology for Right homonymous hemianopsia. Concern for acute ischemic stroke 2/2 HLD and HTN. She is complaining of hot flashes and night sweats although she still has one ovary in place. Will send inquiries questions about contraindications to paxil to neurologist and opthalmology before prescribing      Margaret Kitchen MD

## 2018-12-11 ENCOUNTER — OFFICE VISIT (OUTPATIENT)
Dept: OBSTETRICS AND GYNECOLOGY | Facility: CLINIC | Age: 38
End: 2018-12-11
Payer: COMMERCIAL

## 2018-12-11 ENCOUNTER — OFFICE VISIT (OUTPATIENT)
Dept: FAMILY MEDICINE | Facility: CLINIC | Age: 38
End: 2018-12-11
Payer: COMMERCIAL

## 2018-12-11 VITALS
BODY MASS INDEX: 45.99 KG/M2 | WEIGHT: 293 LBS | SYSTOLIC BLOOD PRESSURE: 180 MMHG | DIASTOLIC BLOOD PRESSURE: 80 MMHG | HEIGHT: 67 IN

## 2018-12-11 VITALS
RESPIRATION RATE: 17 BRPM | HEART RATE: 82 BPM | SYSTOLIC BLOOD PRESSURE: 142 MMHG | TEMPERATURE: 99 F | DIASTOLIC BLOOD PRESSURE: 76 MMHG | WEIGHT: 293 LBS | OXYGEN SATURATION: 99 % | HEIGHT: 67 IN | BODY MASS INDEX: 45.99 KG/M2

## 2018-12-11 DIAGNOSIS — H47.11 PAPILLEDEMA ASSOCIATED WITH INCREASED INTRACRANIAL PRESSURE: Primary | ICD-10-CM

## 2018-12-11 DIAGNOSIS — E66.01 MORBID OBESITY: ICD-10-CM

## 2018-12-11 DIAGNOSIS — Z90.710 S/P ABDOMINAL HYSTERECTOMY AND LEFT SALPINGO-OOPHORECTOMY: Primary | ICD-10-CM

## 2018-12-11 DIAGNOSIS — Z90.721 S/P ABDOMINAL HYSTERECTOMY AND LEFT SALPINGO-OOPHORECTOMY: Primary | ICD-10-CM

## 2018-12-11 DIAGNOSIS — Z90.79 S/P ABDOMINAL HYSTERECTOMY AND LEFT SALPINGO-OOPHORECTOMY: Primary | ICD-10-CM

## 2018-12-11 DIAGNOSIS — I10 ESSENTIAL HYPERTENSION: ICD-10-CM

## 2018-12-11 PROCEDURE — 99214 OFFICE O/P EST MOD 30 MIN: CPT | Mod: S$GLB,,, | Performed by: INTERNAL MEDICINE

## 2018-12-11 PROCEDURE — 99024 POSTOP FOLLOW-UP VISIT: CPT | Mod: S$GLB,,, | Performed by: OBSTETRICS & GYNECOLOGY

## 2018-12-11 PROCEDURE — 99999 PR PBB SHADOW E&M-EST. PATIENT-LVL III: CPT | Mod: PBBFAC,,, | Performed by: INTERNAL MEDICINE

## 2018-12-11 PROCEDURE — 99999 PR PBB SHADOW E&M-EST. PATIENT-LVL II: CPT | Mod: PBBFAC,,, | Performed by: OBSTETRICS & GYNECOLOGY

## 2018-12-11 RX ORDER — BUTALBITAL, ACETAMINOPHEN AND CAFFEINE 50; 325; 40 MG/1; MG/1; MG/1
1 TABLET ORAL EVERY 4 HOURS PRN
COMMUNITY
End: 2019-02-08 | Stop reason: SDUPTHER

## 2018-12-11 NOTE — Clinical Note
Patient seen by Dr. Palma 11/27 and referred for LP, no evidence of scheduling. Please assist with scheduling. Thank you

## 2018-12-11 NOTE — LETTER
December 11, 2018    Bijal Molina  [address]  [place] [address]         [facility]  [address] [facility]  [place] [address]  Phone: [phone] December 11, 2018     Patient: Bijal Molina   YOB: 1980   Date of Visit: 12/11/2018       To Whom It May Concern:    It is my medical opinion that Bijal Molina is undergoing evaluation for an acute medical condition. 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 She should be allowed to return to light duty not to exceed 30 work hours in one week. These restrictions will be in place until Jan 12 2019. .    If you have any questions or concerns, please don't hesitate to call.    Sincerely,        Misbah Oshea MD

## 2018-12-11 NOTE — PROGRESS NOTES
"Subjective:       Patient ID: Bijal Molina is a 38 y.o. female.    Chief Complaint: Establish Care and Follow-up    F/u hospitalization    HPI: 39 y/o with morbid obesity presents for scheduled follow up. Admitted one month ago for right visual field loss. Had cerebral angiogram without intervention. No change in vision. She does report headaches have resolved taking diamox bid and lisinopril daily no adverse effects. She has been seen by neuro but LP has not been scheduled. Denies motor weakness or parathesia.       Review of Systems   Constitutional: Negative for activity change, appetite change, fatigue, fever and unexpected weight change.   HENT: Negative for ear pain, rhinorrhea and sore throat.    Eyes: Negative for photophobia, pain, discharge and redness.   Respiratory: Negative for chest tightness, shortness of breath and wheezing.    Cardiovascular: Negative for chest pain, palpitations and leg swelling.   Gastrointestinal: Negative for abdominal pain, constipation and diarrhea.   Endocrine: Negative for cold intolerance and heat intolerance.   Genitourinary: Negative for dysuria and hematuria.   Musculoskeletal: Negative for joint swelling and neck stiffness.   Skin: Negative for rash.   Neurological: Negative for dizziness, syncope, weakness and headaches.   Psychiatric/Behavioral: Negative for suicidal ideas.       Objective:     Vitals:    12/11/18 1454 12/11/18 1522   BP: (!) 157/79 (!) 142/76   BP Location: Right arm    Patient Position: Sitting    Pulse: 82    Resp: 17    Temp: 99 °F (37.2 °C)    TempSrc: Oral    SpO2: 99%    Weight: (!) 160.1 kg (352 lb 15.3 oz)    Height: 5' 7" (1.702 m)           Physical Exam   Constitutional: She is oriented to person, place, and time. She appears well-developed and well-nourished.   HENT:   Head: Normocephalic and atraumatic.   Eyes: Conjunctivae are normal. No scleral icterus.   Neck: Normal range of motion.   Cardiovascular: Normal rate and regular " rhythm. Exam reveals no gallop and no friction rub.   No murmur heard.  Pulmonary/Chest: Effort normal and breath sounds normal. She has no wheezes. She has no rales.   Abdominal: Soft. Bowel sounds are normal. There is no tenderness. There is no rebound and no guarding.   Musculoskeletal: Normal range of motion. She exhibits no edema or tenderness.   Neurological: She is alert and oriented to person, place, and time.   Skin: Skin is warm and dry.   Psychiatric: She has a normal mood and affect.       Assessment and Plan   1. Papilledema associated with increased intracranial pressure  Suspect Pseudotumor cerebri but needs LP to confirm no significant elevated pressure will message with her treating neurologist to follow up on scheduling    2. Essential hypertension  Just above goal continue ACEi    3. Morbid obesity  The patient is asked to make an attempt to improve diet and exercise patterns to aid in medical management of this problem.

## 2018-12-11 NOTE — PROGRESS NOTES
History & Physical  Gynecology      SUBJECTIVE:     Chief Complaint: Post-op Evaluation       History of Present Illness:  Postoperative Follow-up  Ms. Molina is a 38 year female who presents to the clinic 6.5 weeks status post EVETTE/BS/left oophorectomy for abnormal uterine bleeding and fibroids. She is tolerating a regular diet without difficulty.She has normal bowel movements and reports that her pain is controlled. She does complain of occasional shocking pains near her incision. She also complains of hot flashes since the surgery.    Of note, the patient has been diagnosed with right homonymous hemianopsia since surgery. Per Neurologist, patient likely had a stroke during or immediately following surgery as that is when she began to complain of changes in vision. Patient has a history of hypertension, and hyperlipidemia which Neurology states are risks for stroke. While in house immediately following surgery patient complained of blurry vision but reported improvement when she resumed Diamox which she was already taking for papilledema.      S/p EVETTE/BS/Left Oophorectomy  FINAL PATHOLOGIC DIAGNOSIS  Uterus and cervix (834 g):  -Multiple leiomyomata  -Early proliferative endometrium. Glands appear free of hyperplasia and atypia.  -Squamous metaplasia and Nabothian cysts of the cervix  Ovary (1, submitted as left):  -Multiple cystic follicles  -Fibrosis and distortion with extensive recent hemorrhage, suggestive of adhesion  Fallopian tubes (2):  -No intrinsic histopathologic abnormalities  -Serosal fibrosis of one tube suggestive of adhesion  -Microscopic hydatid cysts      Review of patient's allergies indicates:   Allergen Reactions    Depo-provera contraceptive Blisters       Past Medical History:   Diagnosis Date    Anemia     Elevated intracranial pressure     Hypertension     Migraines     Obesity     Obesity      Past Surgical History:   Procedure Laterality Date     SECTION, LOW TRANSVERSE       x3    HYSTERECTOMY, TOTAL, ABDOMINAL N/A 10/29/2018    Performed by Margaret Alexis MD at [facility] OR    SALPINGECTOMY Bilateral 10/29/2018    Procedure: SALPINGECTOMY;  Surgeon: Margaret Alexis MD;  Location: [facility] OR;  Service: OB/GYN;  Laterality: Bilateral;    SALPINGECTOMY Bilateral 10/29/2018    Performed by [facility] at [facility] OR    TOTAL ABDOMINAL HYSTERECTOMY N/A 10/29/2018    Procedure: HYSTERECTOMY, TOTAL, ABDOMINAL;  Surgeon: Margaret Alexis MD;  Location: [facility] OR;  Service: OB/GYN;  Laterality: N/A;  RN PREOP 10/22/2018-----BMI--55.2---  T&M FOR 2UNITS ORDERED----UPT    TUBAL LIGATION       OB History      Para Term  AB Living    3 3 3     3    SAB TAB Ectopic Multiple Live Births            3        Family History   Problem Relation Age of Onset    Breast cancer Mother     Breast cancer Maternal Aunt     Breast cancer Maternal Aunt      Social History     Tobacco Use    Smoking status: Current Every Day Smoker     Types: Cigarettes    Smokeless tobacco: Never Used   Substance Use Topics    Alcohol use: No     Frequency: Monthly or less     Comment: social    Drug use: Yes     Types: Marijuana     Comment: very rare        Current Outpatient Medications   Medication Sig    acetaZOLAMIDE (DIAMOX) 500 mg CpSR Take 1 capsule (500 mg total) by mouth 2 (two) times daily.    atorvastatin (LIPITOR) 40 MG tablet Take 1 tablet (40 mg total) by mouth once daily.    butalbital-acetaminophen-caffeine -40 mg (FIORICET, ESGIC) -40 mg per tablet Take 1 tablet by mouth every 4 (four) hours as needed for Pain.    clopidogrel (PLAVIX) 75 mg tablet Take 1 tablet (75 mg total) by mouth once daily.    lisinopril (PRINIVIL,ZESTRIL) 40 MG tablet Take 40 mg by mouth once daily.      No current facility-administered medications for this visit.          Review of Systems:  Review of Systems   Constitutional: Negative for chills and fever.    Eyes: Positive for visual disturbance.   Respiratory: Negative for shortness of breath.    Cardiovascular: Negative for chest pain and palpitations.   Gastrointestinal: Negative for abdominal pain, nausea and vomiting.   Endocrine: Positive for hot flashes.   Genitourinary: Negative for dysuria, frequency, hematuria, pelvic pain, vaginal bleeding, vaginal discharge and vaginal pain.   Neurological: Negative for headaches.        OBJECTIVE:     Physical Exam:  Physical Exam   Constitutional: She is oriented to person, place, and time. She appears well-developed and well-nourished.   Cardiovascular: Normal rate.   Pulmonary/Chest: Effort normal.   Genitourinary:   Genitourinary Comments: Vaginal cuff intact   Neurological: She is alert and oriented to person, place, and time.   Skin: Skin is warm and dry.   Psychiatric: She has a normal mood and affect.   Nursing note and vitals reviewed.    ASSESSMENT:       ICD-10-CM ICD-9-CM    1. S/P EVETTE/BS/Left oophorectomy Z90.710 V88.01     Z90.79 V45.77     Z90.721        Plan:      Bijal was seen today for post-op evaluation.    Diagnoses and all orders for this visit:    S/P EVETTE/BS/Left oophorectomy  - Doing well and cleared surgically  - Will contact Neurologist about Paxil for hot flashes      No orders of the defined types were placed in this encounter.      Follow-up in about 3 months (around 3/11/2019) for f/u.     Counseling time: 30 minutes    Margaret Alexis

## 2018-12-12 ENCOUNTER — HOME CARE VISIT (OUTPATIENT)
Dept: NEUROLOGY | Facility: HOSPITAL | Age: 38
End: 2018-12-12

## 2018-12-15 NOTE — PATIENT INSTRUCTIONS
Discharge Instructions for Abdominal Hysterectomy  You had a procedure called abdominal hysterectomy, a surgery to remove your uterus. This can relieve such problems as severe pain and bleeding. It usually takes from 4 to 6 weeks to recover from abdominal hysterectomy. Remember, though, that recovery time varies from woman to woman.     When to call your doctor  Call your doctor right away if you have any of the following:  · Fever above 100.4°F (38°C)   · Chills  · Bright red vaginal bleeding or vaginal bleeding thatsoaks more than 1 pad per hour  · A smelly discharge from the vagina  · Trouble urinating or burning when you urinate  · Severe pain or bloating in your abdomen  · Redness, swelling, or drainage at your incision site  · Shortness of breath or chest pain  · Nausea and vomiting      Home care  These are suggestions for what to do once you are home:  · Dont drive until your doctor says it's OK. Dont drive while you are still taking opioid pain medications.  · Ask others to help with chores and errands while you recover.  · Dont lift anything heavier than 10 pounds for 6 weeks.  · Dont vacuum or do other strenuous activities until the doctor says its OK.  · Walk as often as you feel able.  · When you must climb stairs, go slowly and pause after every few steps.  · Continue the coughing and deep breathing exercises that you learned in the hospital.  · Avoid constipation:  ¨ Eat fruits, vegetables, and whole grains.  ¨ Drink 6 to 8 glasses of water a day, unless directed otherwise.  ¨ Use a laxative or a mild stool softener if your doctor says its OK.  · Shower as usual. Wash your incision with mild soap and water. Do not scrub the incision to clean it. Pat it dry.  · Dont use oils, powders, or lotions on your incision.  · Dont put anything in your vagina until your doctor says its safe to do so. Dont use tampons or douches. Dont have sex. Do not do any of these things for 6 weeks.  · If you had  both ovaries removed, report hot flashes, mood swings, and irritability to your doctor. There may be medications that can help you.  Follow-up  · Ask your doctor when you can return to work.  Date Last Reviewed: 5/19/2015  © 8286-2330 [employer]. [address] [place] [address], [place] [address]. All rights reserved. This information is not intended as a substitute for professional medical care. Always follow your healthcare professional's instructions.

## 2019-01-09 VITALS
OXYGEN SATURATION: 98 % | HEART RATE: 76 BPM | SYSTOLIC BLOOD PRESSURE: 170 MMHG | RESPIRATION RATE: 20 BRPM | DIASTOLIC BLOOD PRESSURE: 98 MMHG

## 2019-01-09 NOTE — PROGRESS NOTES
AAOx3.  Lives w/ family--son was present but not engaged in visit.  NIHSS-0; does not smoke; refrains from adding salt, but does have salty and fast food occasionally; exercises 3-5x/wk at fitness gym; compliance w/meds is not consistent.  Education provided on goal of stroke mobile, s/s of stroke, diet, exercise, medications.  Detailed Instructions provided on importance of medication compliance.Verbalized understanding. Encouraged to utilize stroke team for any concern.

## 2019-01-10 NOTE — PROCEDURES
DATE OF PROCEDURE:  12/14/2018        An event monitor with auto-triggering capabilities was issued between 11/14/2018   and 12/14/2018.    Six patient-triggered and two auto-triggered transmissions were submitted for   review.    No symptoms were entered.    One transmission was made during exercise and revealed sinus tachycardia at a   rate of 123-126 beats per minute.    Another transmission with sinus tachycardia (106-125 beats per minute) was not   tagged with specific activities.    The rest of the transmissions revealed sinus rhythm with a low rate of 72 and a   high rate of 97 beats per minute.      FMA/IN  dd: 01/02/2019 01:05:55 (CST)  td: 01/02/2019 03:09:39 (CST)  Doc ID   #[id number]  Job ID #[id number]    CC:

## 2019-01-11 ENCOUNTER — OFFICE VISIT (OUTPATIENT)
Dept: FAMILY MEDICINE | Facility: CLINIC | Age: 39
End: 2019-01-11
Payer: COMMERCIAL

## 2019-01-11 VITALS
BODY MASS INDEX: 45.99 KG/M2 | RESPIRATION RATE: 17 BRPM | OXYGEN SATURATION: 99 % | HEART RATE: 90 BPM | WEIGHT: 293 LBS | SYSTOLIC BLOOD PRESSURE: 150 MMHG | HEIGHT: 67 IN | DIASTOLIC BLOOD PRESSURE: 95 MMHG | TEMPERATURE: 98 F

## 2019-01-11 DIAGNOSIS — E89.40 SURGICAL MENOPAUSE: ICD-10-CM

## 2019-01-11 DIAGNOSIS — I10 ESSENTIAL HYPERTENSION: ICD-10-CM

## 2019-01-11 DIAGNOSIS — H47.11 PAPILLEDEMA ASSOCIATED WITH INCREASED INTRACRANIAL PRESSURE: Primary | ICD-10-CM

## 2019-01-11 DIAGNOSIS — E66.01 MORBID OBESITY: ICD-10-CM

## 2019-01-11 PROCEDURE — 99999 PR PBB SHADOW E&M-EST. PATIENT-LVL IV: ICD-10-PCS | Mod: PBBFAC,,, | Performed by: INTERNAL MEDICINE

## 2019-01-11 PROCEDURE — 99999 PR PBB SHADOW E&M-EST. PATIENT-LVL IV: CPT | Mod: PBBFAC,,, | Performed by: INTERNAL MEDICINE

## 2019-01-11 PROCEDURE — 99214 OFFICE O/P EST MOD 30 MIN: CPT | Mod: S$GLB,,, | Performed by: INTERNAL MEDICINE

## 2019-01-11 PROCEDURE — 99214 PR OFFICE/OUTPT VISIT, EST, LEVL IV, 30-39 MIN: ICD-10-PCS | Mod: S$GLB,,, | Performed by: INTERNAL MEDICINE

## 2019-01-11 RX ORDER — PAROXETINE HYDROCHLORIDE 20 MG/1
20 TABLET, FILM COATED ORAL EVERY MORNING
Qty: 30 TABLET | Refills: 2 | Status: SHIPPED | OUTPATIENT
Start: 2019-01-11 | End: 2020-01-11

## 2019-01-11 RX ORDER — AMLODIPINE BESYLATE 10 MG/1
10 TABLET ORAL DAILY
Qty: 30 TABLET | Refills: 5 | Status: SHIPPED | OUTPATIENT
Start: 2019-01-11 | End: 2020-03-17

## 2019-01-11 NOTE — LETTER
January 11, 2019    Bijal Molina  [address]  [place] [address]         [facility]  [address]  [place] [address]  Phone: [phone] January 11, 2019     Patient: Bijal Molina   YOB: 1980   Date of Visit: 1/11/2019       To Whom It May Concern:    It is my medical opinion that Bijal Molina is under my care for acute medical conditions. She should not be working at heights. This restriction will remain in place until March 11 2019..    If you have any questions or concerns, please don't hesitate to call.    Sincerely,        Misbah Oshea MD

## 2019-01-11 NOTE — PROGRESS NOTES
"Subjective:       Patient ID: Bijal Molina is a 38 y.o. female.    Chief Complaint: Establish Care and Follow-up    F/u chronic conditions    HPI: 37 y/o w/ HTN morbid obesity pseudo tumor cerebri (on diamox) presents for scheduled follow up. Was never contacted by neuro since last visit to schedule LP. persistant headaches she denies motor weakness. She has parathesia of all fingers since starting diamox. Her primary complaint today is hot flashes. These have been persistant since her hysterectomy no night sweats no weight loss. No LE swelling       Review of Systems   Constitutional: Negative for activity change, appetite change, fatigue, fever and unexpected weight change.   HENT: Negative for ear pain, rhinorrhea and sore throat.    Eyes: Negative for discharge and visual disturbance.   Respiratory: Negative for chest tightness, shortness of breath and wheezing.    Cardiovascular: Negative for chest pain, palpitations and leg swelling.   Gastrointestinal: Negative for abdominal pain, constipation and diarrhea.   Genitourinary: Negative for dysuria and hematuria.   Musculoskeletal: Negative for joint swelling and neck stiffness.   Skin: Negative for rash.   Neurological: Positive for headaches. Negative for dizziness, syncope and weakness.   Psychiatric/Behavioral: Negative for suicidal ideas.       Objective:     Vitals:    01/11/19 1442   BP: (!) 150/95   BP Location: Right arm   Patient Position: Sitting   Pulse: 90   Resp: 17   Temp: 97.9 °F (36.6 °C)   TempSrc: Oral   SpO2: 99%   Weight: (!) 165.6 kg (365 lb 1.3 oz)   Height: 5' 7" (1.702 m)          Physical Exam   Constitutional: She is oriented to person, place, and time. She appears well-developed and well-nourished.   HENT:   Head: Normocephalic and atraumatic.   Neck: Normal range of motion.   Cardiovascular: Normal rate and regular rhythm. Exam reveals no gallop and no friction rub.   No murmur heard.  No LE edema   Pulmonary/Chest: Effort normal " and breath sounds normal. She has no wheezes. She has no rales.   Abdominal: Soft. Bowel sounds are normal. There is no tenderness. There is no rebound and no guarding.   Musculoskeletal: Normal range of motion. She exhibits no edema or tenderness.   Neurological: She is alert and oriented to person, place, and time. No cranial nerve deficit.   Skin: Skin is warm and dry.   Psychiatric: She has a normal mood and affect.       Assessment and Plan   1. Essential hypertension  Above goal add amlodipine  - amLODIPine (NORVASC) 10 MG tablet; Take 1 tablet (10 mg total) by mouth once daily.  Dispense: 30 tablet; Refill: 5    2. Papilledema associated with increased intracranial pressure  Needs neuro assistance with diagnostic LP, [place] [facility] [place] continue diamox  - Ambulatory Referral to Neurology    3. Surgical menopause  Low dose ssri  - paroxetine (PAXIL) 20 MG tablet; Take 1 tablet (20 mg total) by mouth every morning.  Dispense: 30 tablet; Refill: 2    4. Morbid obesity  The patient is asked to make an attempt to improve diet and exercise patterns to aid in medical management of this problem.

## 2019-01-11 NOTE — LETTER
January 11, 2019    Bijal Molina  [address]  [place] [address]         [facility]  [address]  [place] [address]  Phone: [phone] January 11, 2019     Patient: Bijal Molina   YOB: 1980   Date of Visit: 1/11/2019       To Whom It May Concern:    It is my medical opinion that Bijal Molina is under my care for acute and chronic medical conditions. She should not work at heights. She shoule be limited to 30 hours per week. These restrictions will remain in place until March 12 2019..    If you have any questions or concerns, please don't hesitate to call.    Sincerely,        Misbah Oshea MD

## 2019-01-24 ENCOUNTER — HOME CARE VISIT (OUTPATIENT)
Dept: NEUROLOGY | Facility: HOSPITAL | Age: 39
End: 2019-01-24

## 2019-01-28 ENCOUNTER — OFFICE VISIT (OUTPATIENT)
Dept: NEUROLOGY | Facility: CLINIC | Age: 39
End: 2019-01-28
Payer: COMMERCIAL

## 2019-01-28 ENCOUNTER — TELEPHONE (OUTPATIENT)
Dept: NEUROLOGY | Facility: CLINIC | Age: 39
End: 2019-01-28

## 2019-01-28 ENCOUNTER — LAB VISIT (OUTPATIENT)
Dept: LAB | Facility: HOSPITAL | Age: 39
End: 2019-01-28
Attending: PSYCHIATRY & NEUROLOGY
Payer: COMMERCIAL

## 2019-01-28 VITALS
WEIGHT: 293 LBS | BODY MASS INDEX: 45.99 KG/M2 | HEIGHT: 67 IN | SYSTOLIC BLOOD PRESSURE: 188 MMHG | HEART RATE: 84 BPM | DIASTOLIC BLOOD PRESSURE: 83 MMHG

## 2019-01-28 DIAGNOSIS — G93.2 PSEUDOTUMOR CEREBRI SYNDROME: ICD-10-CM

## 2019-01-28 DIAGNOSIS — R93.89 ABNORMAL MRI: Primary | ICD-10-CM

## 2019-01-28 DIAGNOSIS — H53.8 BLURRY VISION, BILATERAL: ICD-10-CM

## 2019-01-28 DIAGNOSIS — H53.423 SCOTOMA OF BLIND SPOT AREA OF BOTH EYES: ICD-10-CM

## 2019-01-28 DIAGNOSIS — H47.11 PAPILLEDEMA ASSOCIATED WITH INCREASED INTRACRANIAL PRESSURE: ICD-10-CM

## 2019-01-28 LAB — CRP SERPL-MCNC: 2.9 MG/L

## 2019-01-28 PROCEDURE — 99214 PR OFFICE/OUTPT VISIT, EST, LEVL IV, 30-39 MIN: ICD-10-PCS | Mod: S$GLB,,, | Performed by: PSYCHIATRY & NEUROLOGY

## 2019-01-28 PROCEDURE — 85305 CLOT INHIBIT PROT S TOTAL: CPT | Mod: 91

## 2019-01-28 PROCEDURE — 85305 CLOT INHIBIT PROT S TOTAL: CPT

## 2019-01-28 PROCEDURE — 36415 COLL VENOUS BLD VENIPUNCTURE: CPT

## 2019-01-28 PROCEDURE — 84165 PROTEIN E-PHORESIS SERUM: CPT

## 2019-01-28 PROCEDURE — 84165 PROTEIN E-PHORESIS SERUM: CPT | Mod: 26,,, | Performed by: PATHOLOGY

## 2019-01-28 PROCEDURE — 99214 OFFICE O/P EST MOD 30 MIN: CPT | Mod: S$GLB,,, | Performed by: PSYCHIATRY & NEUROLOGY

## 2019-01-28 PROCEDURE — 81241 F5 GENE: CPT

## 2019-01-28 PROCEDURE — 99999 PR PBB SHADOW E&M-EST. PATIENT-LVL III: ICD-10-PCS | Mod: PBBFAC,,, | Performed by: PSYCHIATRY & NEUROLOGY

## 2019-01-28 PROCEDURE — 99999 PR PBB SHADOW E&M-EST. PATIENT-LVL III: CPT | Mod: PBBFAC,,, | Performed by: PSYCHIATRY & NEUROLOGY

## 2019-01-28 PROCEDURE — 86140 C-REACTIVE PROTEIN: CPT

## 2019-01-28 PROCEDURE — 85302 CLOT INHIBIT PROT C ANTIGEN: CPT

## 2019-01-28 PROCEDURE — 86141 C-REACTIVE PROTEIN HS: CPT

## 2019-01-28 PROCEDURE — 85613 RUSSELL VIPER VENOM DILUTED: CPT

## 2019-01-28 PROCEDURE — 84165 PATHOLOGIST INTERPRETATION SPE: ICD-10-PCS | Mod: 26,,, | Performed by: PATHOLOGY

## 2019-01-28 PROCEDURE — 86147 CARDIOLIPIN ANTIBODY EA IG: CPT

## 2019-01-28 RX ORDER — ACETAZOLAMIDE 500 MG/1
500 CAPSULE, EXTENDED RELEASE ORAL 2 TIMES DAILY
Qty: 60 CAPSULE | Refills: 5 | Status: SHIPPED | OUTPATIENT
Start: 2019-01-28 | End: 2019-03-12 | Stop reason: DRUGHIGH

## 2019-01-28 NOTE — PROGRESS NOTES
"Neurology Follow Up Note    Chief Complaint: follow up for headaches, and blurry vision    HPI:   Bijal Molina is a 38 y.o. woman with pmhx of HTN and suspected idiopathic intracranial hypertension who presents for follow up. She is currently following with Dr. Palma for suspected stroke as well as Dr. Colvin for visual field deficit. She states that approximately 6 months ago, she began to get holocephalic daily aching headaches associated with blurry vision, but she denies loss of vision. She states one day she woke up with her eyes "swole shut" and she went to an optometrist and was told she had extra fluid in the brain. She was referred to Dr. Colvin and was found to have papilledema. She was started on diamox 500mg bid and she states her headaches began to improve, but she now feels they are coming back again. She had an MRI brain and MRV which showed tapering of the dural venous sinuses at the transverse sigmoid junction bilaterally, but was otherwise unremarkable. She then states she had a hysterectomy at the end of October. She states when she woke up from her hysterectomy, she had extreme blurry vision and left sided weakness. She states she went to see Dr. Colvin and was found to have a right visual field deficit. She was sent to the ED and had a repeat MRI brain which showed edema in L occipital region of unclear etiology. This was thought to be a L PCA infarct and she has been seen by vascular neurology for further workup for this. She is currently on plavix and lipitor due to the infarct. She had an IR cerebral angiogram which was found to be unremarkable. She was to have an outpatient LP to evaluate for idiopathic intracranial hypertension, but states nobody has contacted to set her up for this as of yet. She states she has numbness on the left side of her body since the stroke, but states her left sided weakness improved. She states she has a moderate aching headache. She denies loss of vision. " She has no further complaints.     Past Medical History:  Past Medical History:   Diagnosis Date    Anemia     Elevated intracranial pressure     Hypertension     Migraines     Obesity     Obesity        Past Surgical History:  Past Surgical History:   Procedure Laterality Date     SECTION, LOW TRANSVERSE      x3    HYSTERECTOMY, TOTAL, ABDOMINAL N/A 10/29/2018    [facility]    SALPINGECTOMY Bilateral 10/29/2018    [facility]    TUBAL LIGATION         Social History:  Social History     Socioeconomic History    Marital status: Legally      Spouse name: Not on file    Number of children: Not on file    Years of education: Not on file    Highest education level: Not on file   Social Needs    Financial resource strain: Not on file    Food insecurity - worry: Not on file    Food insecurity - inability: Not on file    Transportation needs - medical: Not on file    Transportation needs - non-medical: Not on file   Occupational History    Not on file   Tobacco Use    Smoking status: Current Every Day Smoker     Types: Cigarettes    Smokeless tobacco: Never Used   Substance and Sexual Activity    Alcohol use: No     Frequency: Monthly or less     Comment: social    Drug use: Yes     Types: Marijuana     Comment: very rare     Sexual activity: Not Currently     Birth control/protection: None   Other Topics Concern    Not on file   Social History Narrative    Not on file       Family History:  Family History   Problem Relation Age of Onset    Breast cancer Mother     Breast cancer Maternal Aunt     Breast cancer Maternal Aunt        Medications:  Current Outpatient Medications   Medication Sig Dispense Refill    acetaZOLAMIDE (DIAMOX) 500 mg CpSR Take 1 capsule (500 mg total) by mouth 2 (two) times daily. 60 capsule 5    amLODIPine (NORVASC) 10 MG tablet Take 1 tablet (10 mg total) by mouth  once daily. 30 tablet 5    atorvastatin (LIPITOR) 40 MG tablet Take 1 tablet (40 mg total) by mouth once daily. 30 tablet 1    butalbital-acetaminophen-caffeine -40 mg (FIORICET, ESGIC) -40 mg per tablet Take 1 tablet by mouth every 4 (four) hours as needed for Pain.      clopidogrel (PLAVIX) 75 mg tablet Take 1 tablet (75 mg total) by mouth once daily. 30 tablet 1    lisinopril (PRINIVIL,ZESTRIL) 40 MG tablet Take 40 mg by mouth once daily.       paroxetine (PAXIL) 20 MG tablet Take 1 tablet (20 mg total) by mouth every morning. 30 tablet 2     No current facility-administered medications for this visit.        Allergies:  Review of patient's allergies indicates:   Allergen Reactions    Depo-provera contraceptive Blisters       ROS:  A 12 point review of system was negative aside from pertinent positives and negatives as outlined above.    Physical Exam  Vitals:    01/28/19 1344   BP: (!) 188/83   Pulse: 84       General: well nourished, well developed  Eyes: no scleral icterus   Nose: nasal turbinates intact  Neck: supple, ROM intact  Skin: no rash or ecchymosis  Joints: no swelling or erythema  Cardiac: regular rate and rhythm  Lungs: clear to auscultation bilaterally    Neuro:  Mental status: AAO x 3, no dysarthria, no aphasia, communicating appropriately  CN: PERRL, EOMI, right homonymous hemianopia , V1-V3 sensation decreased on left, no facial asymmetry, hearing grossly intact, tongue midline  Motor:   RUE 5/5  RLE 5/5  LUE 5/5  LLE 5/5    Normal bulk and tone    Reflexes: 2+ throughout, toes equivocal bilaterally  Sensory: decreased to light touch in left upper and left lower extremity, intact to light touch on right  Coordination: no dysmetria on FTN  Gait: steady    Prior Imaging/Labs:  MRI BRAIN WITHOUT CONTRAST    CLINICAL HISTORY:  stroke right lower field deficits;.  Cerebral infarction, unspecified    TECHNIQUE:  Multiplanar multisequence MR imaging of the brain was performed without  contrast.    COMPARISON:  October 17, 2018.    FINDINGS:  Intracranial compartment:    Ventricles and sulci are normal in size for age without evidence of hydrocephalus. No extra-axial blood or fluid collections.    Interval development of increased T2/FLAIR signal in the left occipital lobe since the prior examination.  No associated mass effect.  There is T2 shine through signal on the diffusion sequence in this region without ADC match.  No mass lesion, acute hemorrhage or acute infarct.  A few scattered T2/FLAIR hyperintense supratentorial small white matter signal foci appear unchanged.    Incidental right parietal small developmental venous anomaly appears unchanged.  Empty sella configuration appears unchanged.    Normal vascular flow voids are preserved.    Skull/extracranial contents (limited evaluation): Bone marrow signal intensity is normal.      Impression       Interval development of increased T2/FLAIR signal in the left occipital lobe since the prior examination. No associated mass effect.  There is T2 shine through signal on the diffusion sequence in this region without ADC match.  Finding represents nonspecific edema of uncertain etiology.    Remainder of the exam appears unchanged from prior.           Assessment and Plan:  37 yo woman with headaches and blurry vision likely 2/2 idiopathic intracranial hypertension. She also has a right homonymous hemianopia which is suspected to be 2/2 L PCA infarct, she is following with stroke neurology at [facility]    1) Headache and blurry vision  She was advised to stop taking plavix 7 days prior to LP  IR guilded LP  With opening pressure, remove 35-40 cc of fluid, patient was advised to evaluate for improvement in vision after spinal tap  C/w diamox 500mg bid  Given increased T2 Flair in left occipital lobe will send CSF studies to r/o demyelinating etiology  Follow up with Dr. Colvin in 2 weeks    2) L PCA infarct?  Resume plavix after spinal tap  C/w  lipitor  Follow up with Dr. Palma  Hypercoagulable panel ordered by Dr. Palma  Repeat MRI brain w/wo contrast to evaluate for resolution of edema?     She was advised to notify me for worsening symptoms. She was advised to go to ED if she notices worsening vision for urgent LP for therapeutic removal of fluid.    I will see her back in 1 month or sooner if necessary.      Ritu Martinez DO  [facility]  [address] [facility] [address]  [place] [address]  [phone]

## 2019-01-28 NOTE — LETTER
January 28, 2019      Misbah Oshea MD  [address] [facility] [address]           [facility] [address] [facility] [address]  [facility] [address]  Phone: [phone]  Fax: [phone]          Patient: Bijal Molina   MR Number: [id number]   YOB: 1980   Date of Visit: 1/28/2019       Dear Dr. Misbah Oshea:    Thank you for referring Bijal Molina to me for evaluation. Attached you will find relevant portions of my assessment and plan of care.    If you have questions, please do not hesitate to call me. I look forward to following Bijal Molina along with you.    Sincerely,    Ritu Martinez, DO [facility]  CC:  No Recipients    If you would like to receive this communication electronically, please contact [email or web address] or [phone] to request more information on [employer] access.    For providers and/or their staff who would like to refer a patient to [facility], please contact us through our one-stop-shop provider referral line, [facility], at [phone].    If you feel you have received this communication in error or would no longer like to receive these types of communications, please e-mail [email or web address]

## 2019-01-29 ENCOUNTER — TELEPHONE (OUTPATIENT)
Dept: NEUROLOGY | Facility: CLINIC | Age: 39
End: 2019-01-29

## 2019-01-29 LAB
CARDIOLIPIN IGG SER IA-ACNC: 9.41 GPL
CARDIOLIPIN IGM SER IA-ACNC: 11.71 MPL
CRP SERPL-MCNC: 2.83 MG/L

## 2019-01-30 LAB
ALBUMIN SERPL ELPH-MCNC: 3.83 G/DL
ALPHA1 GLOB SERPL ELPH-MCNC: 0.3 G/DL
ALPHA2 GLOB SERPL ELPH-MCNC: 0.69 G/DL
B-GLOBULIN SERPL ELPH-MCNC: 1 G/DL
GAMMA GLOB SERPL ELPH-MCNC: 1.58 G/DL
PATHOLOGIST INTERPRETATION SPE: NORMAL
PROT SERPL-MCNC: 7.4 G/DL

## 2019-01-31 LAB
PROT S ACT/NOR PPP: 67 %
PROT S AG ACT/NOR PPP IA: 85 %

## 2019-02-01 LAB
F5 P.R506Q BLD/T QL: NORMAL
LA PPP-IMP: NEGATIVE
PROT C AG ACT/NOR PPP IA: 76 % (ref 70–150)

## 2019-02-05 ENCOUNTER — HOSPITAL ENCOUNTER (OUTPATIENT)
Dept: RADIOLOGY | Facility: HOSPITAL | Age: 39
Discharge: HOME | End: 2019-02-05
Attending: PSYCHIATRY & NEUROLOGY
Payer: COMMERCIAL

## 2019-02-05 DIAGNOSIS — R93.89 ABNORMAL MRI: ICD-10-CM

## 2019-02-05 DIAGNOSIS — H53.8 BLURRY VISION, BILATERAL: ICD-10-CM

## 2019-02-05 DIAGNOSIS — G93.2 PSEUDOTUMOR CEREBRI SYNDROME: ICD-10-CM

## 2019-02-05 LAB
CLARITY CSF: ABNORMAL
CLARITY CSF: CLEAR
COLOR CSF: ABNORMAL
COLOR CSF: COLORLESS
GLUCOSE CSF-MCNC: 72 MG/DL
LYMPHOCYTES NFR CSF MANUAL: 50 %
LYMPHOCYTES NFR CSF MANUAL: 96 %
MONOS+MACROS NFR CSF MANUAL: 2 %
NEUTROPHILS NFR CSF MANUAL: 2 %
NEUTROPHILS NFR CSF MANUAL: 50 %
PROT CSF-MCNC: 25 MG/DL
RBC # CSF: 4000 /CU MM
RBC # CSF: 6 /CU MM
SPECIMEN VOL CSF: 1 ML
SPECIMEN VOL CSF: 1 ML
WBC # CSF: 2 /CU MM
WBC # CSF: 7 /CU MM

## 2019-02-05 PROCEDURE — 77003 FL LUMBAR PUNCTURE: ICD-10-PCS | Mod: 26,,, | Performed by: RADIOLOGY

## 2019-02-05 PROCEDURE — 87205 SMEAR GRAM STAIN: CPT

## 2019-02-05 PROCEDURE — 62270 DX LMBR SPI PNXR: CPT

## 2019-02-05 PROCEDURE — 86255 FLUORESCENT ANTIBODY SCREEN: CPT

## 2019-02-05 PROCEDURE — 87798 DETECT AGENT NOS DNA AMP: CPT

## 2019-02-05 PROCEDURE — 89051 BODY FLUID CELL COUNT: CPT | Mod: 91

## 2019-02-05 PROCEDURE — 83873 ASSAY OF CSF PROTEIN: CPT

## 2019-02-05 PROCEDURE — 62270 DX LMBR SPI PNXR: CPT | Mod: ,,, | Performed by: RADIOLOGY

## 2019-02-05 PROCEDURE — 84157 ASSAY OF PROTEIN OTHER: CPT

## 2019-02-05 PROCEDURE — 87070 CULTURE OTHR SPECIMN AEROBIC: CPT

## 2019-02-05 PROCEDURE — 89051 BODY FLUID CELL COUNT: CPT

## 2019-02-05 PROCEDURE — 83615 LACTATE (LD) (LDH) ENZYME: CPT

## 2019-02-05 PROCEDURE — 82945 GLUCOSE OTHER FLUID: CPT

## 2019-02-05 PROCEDURE — 77003 FLUOROGUIDE FOR SPINE INJECT: CPT | Mod: 26,,, | Performed by: RADIOLOGY

## 2019-02-05 PROCEDURE — 82042 OTHER SOURCE ALBUMIN QUAN EA: CPT

## 2019-02-05 PROCEDURE — 62270 FL LUMBAR PUNCTURE: ICD-10-PCS | Mod: ,,, | Performed by: RADIOLOGY

## 2019-02-05 NOTE — H&P
Radiology History & Physical      SUBJECTIVE:      Chief Complaint: headache and blurry vision.     History of Present Illness:  Bijal Molina is a 38 y.o. female who presents for lumbar puncture for CSF removal.     Past Medical History:   Diagnosis Date    Anemia     Elevated intracranial pressure     Hypertension     Migraines     Obesity     Obesity      Past Surgical History:   Procedure Laterality Date     SECTION, LOW TRANSVERSE      x3    HYSTERECTOMY, TOTAL, ABDOMINAL N/A 10/29/2018    [facility]    SALPINGECTOMY Bilateral 10/29/2018    [facility]    TUBAL LIGATION  2004       Home Meds:   Prior to Admission medications    Medication Sig Start Date End Date Taking? Authorizing Provider   acetaZOLAMIDE (DIAMOX) 500 mg CpSR Take 1 capsule (500 mg total) by mouth 2 (two) times daily. 19   Ritu Martinez DO   amLODIPine (NORVASC) 10 MG tablet Take 1 tablet (10 mg total) by mouth once daily. 19  Misbah Oshea MD   atorvastatin (LIPITOR) 40 MG tablet Take 1 tablet (40 mg total) by mouth once daily. 18  STONE Parmar   butalbital-acetaminophen-caffeine -40 mg (FIORICET, ESGIC) -40 mg per tablet Take 1 tablet by mouth every 4 (four) hours as needed for Pain.    Historical Provider, MD   clopidogrel (PLAVIX) 75 mg tablet Take 1 tablet (75 mg total) by mouth once daily. 18  STONE Parmar   lisinopril (PRINIVIL,ZESTRIL) 40 MG tablet Take 40 mg by mouth once daily.  10/10/18   Historical Provider, MD   paroxetine (PAXIL) 20 MG tablet Take 1 tablet (20 mg total) by mouth every morning. 19  Misbah Oshea MD     Anticoagulants/Antiplatelets: Plavix    Allergies:   Review of patient's allergies indicates:   Allergen Reactions    Depo-provera contraceptive Blisters           OBJECTIVE:     Vital Signs (Most Recent)       Physical  Exam:  ASA: 2  Mallampati: 2    General: no acute distress  Mental Status: alert and oriented to person, place and time  HEENT: normocephalic, atraumatic  Chest: unlabored breathing  Heart: regular heart rate  Abdomen: nondistended  Extremity: moves all extremities    Laboratory  Lab Results   Component Value Date    INR 0.9 11/07/2018       Lab Results   Component Value Date    WBC 6.81 11/27/2018    HGB 8.7 (L) 11/27/2018    HCT 31.8 (L) 11/27/2018    MCV 75 (L) 11/27/2018     11/27/2018      Lab Results   Component Value Date     11/08/2018     11/08/2018    K 3.9 11/08/2018     11/08/2018    CO2 24 11/08/2018    BUN 15 11/08/2018    CREATININE 0.8 11/08/2018    CALCIUM 8.6 (L) 11/08/2018    MG 1.9 11/07/2018    ALT 12 11/08/2018    AST 15 11/08/2018    ALBUMIN 2.8 (L) 11/08/2018    BILITOT 0.1 11/08/2018       ASSESSMENT/PLAN:     Patient will undergo Lumbar puncture for volume removal.    Rosina Llanos MD   Radiology Resident.

## 2019-02-05 NOTE — PROCEDURES
Radiology Post-Procedure Note    Pre Op Diagnosis: concern for NPH    Post Op Diagnosis: Same    Procedure: lumbar puncture    Procedure performed by: Jose Heredia MD    Written Informed Consent Obtained: Yes    Specimen Removed: 25 mL CSF    Estimated Blood Loss: Minimal    Findings: Following written informed consent and sterile prep and drape, a 22 gauge spinal needle was inserted at L4 - L5 intralaminar space under fluoroscopic surveillance.  25 mL clear CSF removed and sent to the lab for further analysis.  There were no complications.    Patient tolerated procedure.    Fabrizio Berg MD  Radiology

## 2019-02-05 NOTE — DISCHARGE SUMMARY
Radiology Discharge Summary      Hospital Course: No complications    Admit Date: 2/5/2019  Discharge Date: 02/05/2019     Instructions Given to Patient: Yes  Diet: Resume prior diet  Activity: activity as tolerated    Description of Condition on Discharge: Stable  Vital Signs (Most Recent):      Discharge Disposition: Home    Discharge Diagnosis: concern for NPH    Procedure performed: lumbar puncture    Followup: No dedicated follow up with radiology is required. Patient instructed to call if there is any complication, post procedural pain, or signs of infection. Return to primary for results, as previously scheduled.      Fabrizio Berg MD  Radiology

## 2019-02-06 ENCOUNTER — HOSPITAL ENCOUNTER (EMERGENCY)
Facility: OTHER | Age: 39
Discharge: HOME | End: 2019-02-06
Attending: EMERGENCY MEDICINE
Payer: COMMERCIAL

## 2019-02-06 VITALS
TEMPERATURE: 99 F | RESPIRATION RATE: 18 BRPM | BODY MASS INDEX: 45.99 KG/M2 | OXYGEN SATURATION: 97 % | SYSTOLIC BLOOD PRESSURE: 166 MMHG | WEIGHT: 293 LBS | DIASTOLIC BLOOD PRESSURE: 74 MMHG | HEIGHT: 67 IN | HEART RATE: 81 BPM

## 2019-02-06 DIAGNOSIS — M54.50 ACUTE MIDLINE LOW BACK PAIN WITHOUT SCIATICA: Primary | ICD-10-CM

## 2019-02-06 PROCEDURE — 96372 THER/PROPH/DIAG INJ SC/IM: CPT

## 2019-02-06 PROCEDURE — 99284 EMERGENCY DEPT VISIT MOD MDM: CPT | Mod: 25

## 2019-02-06 PROCEDURE — 63600175 PHARM REV CODE 636 W HCPCS: Performed by: PHYSICIAN ASSISTANT

## 2019-02-06 RX ORDER — MORPHINE SULFATE 10 MG/ML
4 INJECTION INTRAMUSCULAR; INTRAVENOUS; SUBCUTANEOUS
Status: COMPLETED | OUTPATIENT
Start: 2019-02-06 | End: 2019-02-06

## 2019-02-06 RX ADMIN — MORPHINE SULFATE 4 MG: 10 INJECTION INTRAVENOUS at 08:02

## 2019-02-07 LAB
ALBUMIN CSF-MCNC: 16.8 MG/DL
ALBUMIN SERPL-MCNC: 3610 MG/DL
IGG CSF-MCNC: 4.7 MG/DL
IGG SERPL-MCNC: 1450 MG/DL
IGG SYNTH RATE SER+CSF CALC-MRATE: 6.18 MG/24 H
IGG/ALB CLEAR SER+CSF-RTO: 0.7
IGG/ALB CSF: 0.28 {RATIO}
IGG/ALB SER: 0.4 {RATIO}
JCPYV DNA CSF QL NAA+PROBE: NEGATIVE
OLIGOCLONAL BANDS CSF ELPH-IMP: 0 BANDS
OLIGOCLONAL BANDS CSF ELPH-IMP: 2 BANDS
OLIGOCLONAL BANDS SERPL: 2 BANDS

## 2019-02-07 NOTE — ED PROVIDER NOTES
Encounter Date: 2019       History     Chief Complaint   Patient presents with    Back Pain     Pt reports back pain to the area of the spinal tap after having it done yesterday to remove spinal fluid     Patient is a 38-year-old female with a past medical his history of hypertension, CVA, suspected pseudotumor cerebri, presenting to the emergency department complaints of back pain status post LP.  The patient states that she had an outpatient LP performed yesterday to relieve some of her CSF into obtain a sample.  She states that since then she has had significant pain in her low back where the procedure was done.  She denies any numbness, tingling, weakness of her upper lower extremities bilaterally. She does report some residual weakness in the left upper extremity status post her CVA.  She has not taken any medication for her pain today.  She admits she was at work and felt like she could not be there anymore so she left to come here.  No dysuria hematuria.  No loss of urinary bowel control.This is the extent of the patient's complaints at this time.         The history is provided by the patient.     Review of patient's allergies indicates:   Allergen Reactions    Depo-provera contraceptive Blisters     Past Medical History:   Diagnosis Date    Anemia     Elevated intracranial pressure     Hypertension     Migraines     Obesity     Obesity     Stroke      Past Surgical History:   Procedure Laterality Date     SECTION, LOW TRANSVERSE      x3    HYSTERECTOMY, TOTAL, ABDOMINAL N/A 10/29/2018    [facility]    SALPINGECTOMY Bilateral 10/29/2018    [facility]    TUBAL LIGATION  2004     Family History   Problem Relation Age of Onset    Breast cancer Mother     Breast cancer Maternal Aunt     Breast cancer Maternal Aunt      Social History     Tobacco Use    Smoking status: Current Every Day Smoker     Types:  Cigarettes    Smokeless tobacco: Never Used   Substance Use Topics    Alcohol use: No     Frequency: Monthly or less     Comment: social    Drug use: Yes     Types: Marijuana     Comment: very rare      Review of Systems   Constitutional: Negative for activity change, appetite change, chills, fatigue and fever.   HENT: Negative for congestion, rhinorrhea and sore throat.    Eyes: Negative for photophobia and visual disturbance.   Respiratory: Negative for cough, shortness of breath and wheezing.    Cardiovascular: Negative for chest pain.   Gastrointestinal: Negative for abdominal pain, diarrhea, nausea and vomiting.   Genitourinary: Negative for dysuria, hematuria and urgency.   Musculoskeletal: Positive for back pain and myalgias. Negative for neck pain.   Skin: Negative for color change and wound.   Neurological: Negative for weakness and headaches.   Psychiatric/Behavioral: Negative for agitation and confusion.       Physical Exam     Initial Vitals [02/06/19 1711]   BP Pulse Resp Temp SpO2   (!) 147/68 88 18 98.3 °F (36.8 °C) 100 %      MAP       --         Physical Exam    Nursing note and vitals reviewed.  Constitutional: She appears well-developed and well-nourished. She is not diaphoretic. She is cooperative.  Non-toxic appearance. She does not have a sickly appearance. She does not appear ill. No distress.   Morbidly obese,  female accompanied the emergency room.  Speaking clearly in full sentences.  No acute distress.   HENT:   Head: Normocephalic and atraumatic.   Right Ear: External ear normal.   Left Ear: External ear normal.   Nose: Nose normal.   Mouth/Throat: Oropharynx is clear and moist.   Eyes: Conjunctivae and EOM are normal.   Neck: Normal range of motion. Neck supple.   Cardiovascular: Normal rate, regular rhythm and normal heart sounds.   Pulmonary/Chest: Breath sounds normal. No respiratory distress. She has no wheezes.   Musculoskeletal: Normal range of motion.    Tenderness to palpation of the lumbar spine at the bilateral paraspinal muscles in the midline near the site of the previous procedure.  Procedure areas visualized, no surrounding erythema.  No active drainage or bleeding.  Normal range of motion, strength, sensation bilateral upper extremities. Ambulatory and weight-bearing.   Neurological: She is alert and oriented to person, place, and time. She has normal strength. No sensory deficit. GCS eye subscore is 4. GCS verbal subscore is 5. GCS motor subscore is 6.   Skin: Skin is warm.   Psychiatric: She has a normal mood and affect. Her behavior is normal. Judgment and thought content normal.         ED Course   Procedures  Labs Reviewed - No data to display        Medical Decision Making:   Initial Assessment:   Urgent evaluation a 38-year-old female with a past medical history of hypertension, CVA, pseudotumor cerebri, presenting to the emergency department complaints of back pain status post LP.  Patient is afebrile, nontoxic appearing, hemodynamically stable. Physical exam reveals reproducible tenderness to palpation of lumbar spine bilateral paraspinal muscles and at the midline over the site of the procedure.  No surrounding erythema, active drainage. Neurovascularly intact. Normal range of motion, strength, sensation bilateral upper lower extremities. No evidence to indicate infection.  Will plan to treat the patient's pain with IM morphine.  She states she has a ride available.  ED Management:  Patient was given IM morphine to treat her symptoms. At this time, do not feel any further testing imaging is warranted.  She is discharged home in stable condition, counseled further symptomatic care and treatment.The patient was instructed to follow up with a primary care provider in 2 days or to return to the emergency department for worsening symptoms. The treatment plan was discussed with the patient who demonstrated understanding and comfort with plan. The  patient's history, physical exam, and plan of care was discussed with and agreed upon with my supervising physician.     This note was created using M Modal Fluency Direct. There may be typographical errors secondary to dictation.                         Clinical Impression:     1. Acute midline low back pain without sciatica         Disposition:   Disposition: Discharged  Condition: Stable                        Urszula Solorzano PA-C  02/06/19 5449

## 2019-02-08 ENCOUNTER — OFFICE VISIT (OUTPATIENT)
Dept: FAMILY MEDICINE | Facility: CLINIC | Age: 39
End: 2019-02-08
Payer: COMMERCIAL

## 2019-02-08 VITALS
TEMPERATURE: 98 F | WEIGHT: 293 LBS | HEART RATE: 73 BPM | BODY MASS INDEX: 45.99 KG/M2 | HEIGHT: 67 IN | OXYGEN SATURATION: 100 % | DIASTOLIC BLOOD PRESSURE: 78 MMHG | RESPIRATION RATE: 17 BRPM | SYSTOLIC BLOOD PRESSURE: 179 MMHG

## 2019-02-08 DIAGNOSIS — I10 ESSENTIAL HYPERTENSION: Primary | ICD-10-CM

## 2019-02-08 DIAGNOSIS — R51.9 ACUTE NONINTRACTABLE HEADACHE, UNSPECIFIED HEADACHE TYPE: ICD-10-CM

## 2019-02-08 DIAGNOSIS — E66.01 MORBID OBESITY: ICD-10-CM

## 2019-02-08 DIAGNOSIS — H53.461 RIGHT HOMONYMOUS HEMIANOPSIA: ICD-10-CM

## 2019-02-08 PROCEDURE — 99214 PR OFFICE/OUTPT VISIT, EST, LEVL IV, 30-39 MIN: ICD-10-PCS | Mod: S$GLB,,, | Performed by: INTERNAL MEDICINE

## 2019-02-08 PROCEDURE — 99214 OFFICE O/P EST MOD 30 MIN: CPT | Mod: S$GLB,,, | Performed by: INTERNAL MEDICINE

## 2019-02-08 PROCEDURE — 99999 PR PBB SHADOW E&M-EST. PATIENT-LVL III: CPT | Mod: PBBFAC,,, | Performed by: INTERNAL MEDICINE

## 2019-02-08 PROCEDURE — 99999 PR PBB SHADOW E&M-EST. PATIENT-LVL III: ICD-10-PCS | Mod: PBBFAC,,, | Performed by: INTERNAL MEDICINE

## 2019-02-08 RX ORDER — LISINOPRIL 40 MG/1
40 TABLET ORAL DAILY
Qty: 90 TABLET | Refills: 1 | Status: SHIPPED | OUTPATIENT
Start: 2019-02-08

## 2019-02-08 RX ORDER — BUTALBITAL, ACETAMINOPHEN AND CAFFEINE 50; 325; 40 MG/1; MG/1; MG/1
1 TABLET ORAL EVERY 6 HOURS PRN
Qty: 60 TABLET | Refills: 0 | Status: SHIPPED | OUTPATIENT
Start: 2019-02-08

## 2019-02-09 LAB
LDH FLD L TO P-CCNC: 24 U/L
SPECIMEN SOURCE: NORMAL

## 2019-02-10 LAB
BACTERIA CSF CULT: NO GROWTH
GRAM STN SPEC: NORMAL

## 2019-02-12 LAB — MBP CSF-MCNC: <2 MCG/L (ref 2–4)

## 2019-02-13 ENCOUNTER — TELEPHONE (OUTPATIENT)
Dept: FAMILY MEDICINE | Facility: CLINIC | Age: 39
End: 2019-02-13

## 2019-02-18 LAB — NMO/AQP4 FACS,CSF: NEGATIVE

## 2019-02-26 VITALS
HEART RATE: 88 BPM | OXYGEN SATURATION: 99 % | SYSTOLIC BLOOD PRESSURE: 148 MMHG | DIASTOLIC BLOOD PRESSURE: 96 MMHG | RESPIRATION RATE: 20 BRPM

## 2019-02-27 NOTE — PROGRESS NOTES
AAOx3.  Lives w/ family but visit occurred alone.  NIHSS-0; does not smoke; refrains from adding salt but does eat salty and fast food occasionally; no consistent form of exercise noted; reports compliance w/ meds.  Client reports difficulty sleeping due to hot flashes.  Also c/o visual disturbance.  Encouraged to keep appt w/ Neurology on 1/28.   Educated on goal of stroke mobile, s/s of stroke, diet, exercise, medications.  Family/client verbalized understanding.  Encouraged to utilize stroke team for any concern.

## 2019-03-04 ENCOUNTER — HOSPITAL ENCOUNTER (EMERGENCY)
Facility: HOSPITAL | Age: 39
Discharge: HOME | End: 2019-03-04
Attending: EMERGENCY MEDICINE
Payer: COMMERCIAL

## 2019-03-04 VITALS
BODY MASS INDEX: 44.41 KG/M2 | RESPIRATION RATE: 20 BRPM | SYSTOLIC BLOOD PRESSURE: 211 MMHG | WEIGHT: 293 LBS | OXYGEN SATURATION: 100 % | TEMPERATURE: 98 F | HEIGHT: 68 IN | HEART RATE: 90 BPM | DIASTOLIC BLOOD PRESSURE: 108 MMHG

## 2019-03-04 DIAGNOSIS — D64.9 ANEMIA, UNSPECIFIED TYPE: ICD-10-CM

## 2019-03-04 DIAGNOSIS — R11.10 POST-TUSSIVE EMESIS: ICD-10-CM

## 2019-03-04 DIAGNOSIS — J06.9 VIRAL UPPER RESPIRATORY ILLNESS: Primary | ICD-10-CM

## 2019-03-04 LAB
ANION GAP SERPL CALC-SCNC: 8 MMOL/L
BASOPHILS # BLD AUTO: 0.05 K/UL
BASOPHILS NFR BLD: 0.6 %
BUN SERPL-MCNC: 14 MG/DL
CALCIUM SERPL-MCNC: 9 MG/DL
CHLORIDE SERPL-SCNC: 107 MMOL/L
CO2 SERPL-SCNC: 25 MMOL/L
CREAT SERPL-MCNC: 0.7 MG/DL
CTP QC/QA: YES
DIFFERENTIAL METHOD: ABNORMAL
EOSINOPHIL # BLD AUTO: 0.4 K/UL
EOSINOPHIL NFR BLD: 4.4 %
ERYTHROCYTE [DISTWIDTH] IN BLOOD BY AUTOMATED COUNT: 17.7 %
EST. GFR  (AFRICAN AMERICAN): >60 ML/MIN/1.73 M^2
EST. GFR  (NON AFRICAN AMERICAN): >60 ML/MIN/1.73 M^2
GLUCOSE SERPL-MCNC: 90 MG/DL
HCT VFR BLD AUTO: 32.3 %
HGB BLD-MCNC: 9.3 G/DL
IMM GRANULOCYTES # BLD AUTO: 0.02 K/UL
IMM GRANULOCYTES NFR BLD AUTO: 0.2 %
LYMPHOCYTES # BLD AUTO: 2.8 K/UL
LYMPHOCYTES NFR BLD: 34 %
MCH RBC QN AUTO: 21.3 PG
MCHC RBC AUTO-ENTMCNC: 28.8 G/DL
MCV RBC AUTO: 74 FL
MONOCYTES # BLD AUTO: 0.8 K/UL
MONOCYTES NFR BLD: 9.4 %
NEUTROPHILS # BLD AUTO: 4.2 K/UL
NEUTROPHILS NFR BLD: 51.4 %
NRBC BLD-RTO: 0 /100 WBC
PLATELET # BLD AUTO: 335 K/UL
PMV BLD AUTO: 9.4 FL
POC MOLECULAR INFLUENZA A AGN: NEGATIVE
POC MOLECULAR INFLUENZA B AGN: NEGATIVE
POTASSIUM SERPL-SCNC: 3.7 MMOL/L
RBC # BLD AUTO: 4.37 M/UL
SODIUM SERPL-SCNC: 140 MMOL/L
WBC # BLD AUTO: 8.09 K/UL

## 2019-03-04 PROCEDURE — 80048 BASIC METABOLIC PNL TOTAL CA: CPT

## 2019-03-04 PROCEDURE — 93010 ELECTROCARDIOGRAM REPORT: CPT | Mod: ,,, | Performed by: INTERNAL MEDICINE

## 2019-03-04 PROCEDURE — 93010 EKG 12-LEAD: ICD-10-PCS | Mod: ,,, | Performed by: INTERNAL MEDICINE

## 2019-03-04 PROCEDURE — 99285 EMERGENCY DEPT VISIT HI MDM: CPT | Mod: 25

## 2019-03-04 PROCEDURE — 93005 ELECTROCARDIOGRAM TRACING: CPT

## 2019-03-04 PROCEDURE — 99284 PR EMERGENCY DEPT VISIT,LEVEL IV: ICD-10-PCS | Mod: ,,, | Performed by: EMERGENCY MEDICINE

## 2019-03-04 PROCEDURE — 99284 EMERGENCY DEPT VISIT MOD MDM: CPT | Mod: ,,, | Performed by: EMERGENCY MEDICINE

## 2019-03-04 PROCEDURE — 87502 INFLUENZA DNA AMP PROBE: CPT

## 2019-03-04 PROCEDURE — 85025 COMPLETE CBC W/AUTO DIFF WBC: CPT

## 2019-03-04 RX ORDER — BENZONATATE 100 MG/1
200 CAPSULE ORAL 3 TIMES DAILY PRN
Qty: 20 CAPSULE | Refills: 0 | Status: SHIPPED | OUTPATIENT
Start: 2019-03-04 | End: 2019-03-14

## 2019-03-05 NOTE — ED TRIAGE NOTES
Pt presents to the ED c/o of cough, nausea, vomiting hoarseness that started 2x days ago. Pt states constant non-productive dry cough. NBNB emesis. States LBM 4x dasy ago. Endorses nausea. Is a current smoker with also marijuana use in the last 4x weeks. Denies being on home oxygen therapy. Has not taken her BP meds in the last 3x days due to feeling nauseated. Denies being around any ill individuals, c/p numbness tingling to extremities. States to have administered the seasonal flu vaccine. Pt is AAOx4. Name and  checked and verified.

## 2019-03-05 NOTE — PROVIDER PROGRESS NOTES - EMERGENCY DEPT.
Encounter Date: 3/4/2019    ED Physician Progress Notes         EKG - STEMI Decision  Initial Reading: No STEMI present.    SCRIBE NOTE: IGeeta  am scribing for, and in the presence of, Zacarias Fuentes MD. I performed the above scribed service and the documentation accurately describes the services I performed. I attest to the accuracy of the note.

## 2019-03-05 NOTE — ED NOTES
"Approached patient to discharge. Educated on medication prescribed. Patient questioned something for nausea. States she coughs so much she "throws up". 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 I educated her that treating the cough may decrease the vomiting. I asked if she mentioned the vomiting to the doctor and she said "no, I thought he would give me something, I can't go to work like this". I spoke with Dr Machado, who came in to speak with patient in consult room. Patient unable to voice if she was nauseated with or without coughing. She became more upset and insisted her IV be removed and states she "feels like yall think I am crazy". We assured her that we do not think she is crazy and we are trying to get a good history of symptoms to be able to help her. She did not want her discharge papers or prescriptions. She states she "wasted her insurance on this visit". She left her cellphone behind, it was placed in bag and given to security with sticker.   "

## 2019-03-05 NOTE — ED PROVIDER NOTES
"Encounter Date: 3/4/2019    SCRIBE #1 NOTE: I, Tosin Ferrara, am scribing for, and in the presence of,  Dr. Machado. I have scribed the following portions of the note - the EKG reading. Other sections scribed: HPI, ROS, PE.       History     Chief Complaint   Patient presents with    Cough     cough, sob, NV, body aches, hoarseness x 2 days. denies fever     Time patient was seen by the provider: 9:24 PM      Ms. Molina is a 38 y.o. female with history of HTN, migraines, morbid obesity, anemia, hysterectomy (10/2018), pseudotumor cerebri and stroke (residual vision changes) who presents to the ED with a complaint of cough. Patient reports post-tussive emesis x 3 days. Has no associated nausea. She states she started having ear pain b/ last week, which has resolved, followed by pain with swallowing, nausea and cough. She has been unable to tolerate food intake, only fluids. She states she bought, "motion sickness pills," with no relief. She also reports last bowel movement 4 days ago. Denies fever, chest pain, SOB, abd pain.      The history is provided by the patient.     Review of patient's allergies indicates:   Allergen Reactions    Depo-provera contraceptive Blisters     Past Medical History:   Diagnosis Date    Anemia     Elevated intracranial pressure     Hypertension     Migraines     Obesity     Obesity     Stroke      Past Surgical History:   Procedure Laterality Date     SECTION, LOW TRANSVERSE      x3    HYSTERECTOMY, TOTAL, ABDOMINAL N/A 10/29/2018    [facility]    SALPINGECTOMY Bilateral 10/29/2018    [facility]    TUBAL LIGATION  2004     Family History   Problem Relation Age of Onset    Breast cancer Mother     Breast cancer Maternal Aunt     Breast cancer Maternal Aunt      Social History     Tobacco Use    Smoking status: Current Every Day Smoker     Types: Cigarettes    Smokeless tobacco: " Never Used   Substance Use Topics    Alcohol use: No     Frequency: Monthly or less     Comment: social    Drug use: Yes     Types: Marijuana     Comment: very rare      Review of Systems   Constitutional: Negative for fever.   HENT: Negative for ear pain and sore throat.    Respiratory: Positive for cough. Negative for shortness of breath.    Cardiovascular: Negative for chest pain.   Gastrointestinal: Positive for vomiting. Negative for abdominal pain and nausea.   Genitourinary: Negative for dysuria.   Musculoskeletal: Negative for back pain.   Skin: Negative for rash.   Neurological: Negative for weakness.   Hematological: Does not bruise/bleed easily.       Physical Exam     Initial Vitals [03/04/19 1941]   BP Pulse Resp Temp SpO2   (!) 211/108 90 20 97.8 °F (36.6 °C) 100 %      MAP       --         Physical Exam    Nursing note and vitals reviewed.  Constitutional: She appears well-developed and well-nourished. She is not diaphoretic. No distress.   Morbidly obese   HENT:   Head: Normocephalic and atraumatic.   Right Ear: External ear normal.   Left Ear: External ear normal.   Nose: Nose normal.   Mouth/Throat: Oropharynx is clear and moist. No oropharyngeal exudate.   Normal TM's bilaterally. Muffled voice   Eyes: No scleral icterus.   Neck: Neck supple.   Cardiovascular: Normal rate, regular rhythm, normal heart sounds and intact distal pulses.   Pulmonary/Chest: Breath sounds normal. No respiratory distress. She has no wheezes. She has no rhonchi. She has no rales.   Abdominal: Soft. She exhibits no distension and no mass. There is no tenderness. There is no rebound and no guarding.   Lymphadenopathy:     She has no cervical adenopathy.   Neurological: She is alert and oriented to person, place, and time. GCS score is 15. GCS eye subscore is 4. GCS verbal subscore is 5. GCS motor subscore is 6.   Skin: Skin is warm. Capillary refill takes less than 2 seconds. No rash noted.   Psychiatric: She has a normal  mood and affect.         ED Course   Procedures  Labs Reviewed   CBC W/ AUTO DIFFERENTIAL - Abnormal; Notable for the following components:       Result Value    Hemoglobin 9.3 (*)     Hematocrit 32.3 (*)     MCV 74 (*)     MCH 21.3 (*)     MCHC 28.8 (*)     RDW 17.7 (*)     All other components within normal limits   BASIC METABOLIC PANEL   POCT INFLUENZA A/B MOLECULAR     EKG Readings: (Independently Interpreted)   Normal sinus rhythm at a rate of 83. Normal intervals, no ischemic changes, no STEMI     ECG Results          EKG 12-lead (In process)  Result time 03/05/19 09:33:41    In process by Interface, Lab In [facility] (03/05/19 09:33:41)                 Narrative:    Test Reason : R06.02,    Vent. Rate : 083 BPM     Atrial Rate : 083 BPM     P-R Int : 176 ms          QRS Dur : 090 ms      QT Int : 396 ms       P-R-T Axes : 055 079 037 degrees     QTc Int : 465 ms    Normal sinus rhythm  Normal ECG  When compared with ECG of 06-NOV-2018 18:26,  Questionable change in The axis    Referred By: [email or web address]   SELF           Confirmed By:                             Imaging Results          X-Ray Chest PA And Lateral (Final result)  Result time 03/04/19 23:17:14    Final result by Sean Wetzel MD (03/04/19 23:17:14)                 Impression:      No acute cardiopulmonary process.      Electronically signed by: Sean Wetzel MD  Date:    03/04/2019  Time:    23:17             Narrative:    EXAMINATION:  XR CHEST PA AND LATERAL    CLINICAL HISTORY:  Cough    TECHNIQUE:  PA and lateral views of the chest were performed.    COMPARISON:  November 6, 2018.    FINDINGS:  There is no consolidation, effusion, or pneumothorax.    Cardiomediastinal silhouette is unremarkable.    Regional osseous structures are unremarkable.                              X-Rays:   Independently Interpreted Readings:   Other Readings:  CXR viewed. No acute infiltrate, effusion or PTX on my read. Cardiomegaly present.    Medical Decision  Making:   History:   Old Medical Records: I decided to obtain old medical records.  Old Records Summarized: records from clinic visits and records from previous admission(s).  Independently Interpreted Test(s):   I have ordered and independently interpreted X-rays - see summary below.       <> Summary of X-Ray Reading(s): CXR viewed. No acute infiltrate, effusion or PTX on my read.   I have ordered and independently interpreted EKG Reading(s) - see prior notes  Clinical Tests:   Lab Tests: Reviewed and Ordered  Radiological Study: Ordered and Reviewed  Medical Tests: Reviewed and Ordered  ED Management:  Vitals normal. Afebrile. Here w/ URI symptoms and post tussive emesis. EKG ordered from triage, but uncertain why. EKG normal. Patient is a very poor historian, but has numerous comorbidities. Her morbid obesity limits abd exam but there is not TTP even on deep palpation. She expresses concern for possible dehydration. CBC, BMP, rapid flu, CXR obtained.    CXR viewed. No acute infiltrate, effusion or PTX on my read.   Labs grossly WNL w/o actionable values.  Suspect this is all viral etiology. Will treat w/ supportive care. Rx for tessalon Perles provided.    On discharge, patient became upset that I was not treating her vomiting. Further discussion reinforced that she was not experiencing any nausea associated with the emesis and that generally occurred after drinking something cold that causes her start coughing and then vomit. I provided rx for tessalon perles but she refused and left without her discharge paper work.             Scribe Attestation:   Scribe #1: I performed the above scribed service and the documentation accurately describes the services I performed. I attest to the accuracy of the note.               Clinical Impression:       ICD-10-CM ICD-9-CM   1. Viral upper respiratory illness J06.9 465.9   2. Post-tussive emesis R11.10 787.03   3. Anemia, unspecified type D64.9 285.9         Disposition:    Disposition: Discharged  Condition: Stable                        Jefry Machado MD  03/07/19 [id number]

## 2019-03-05 NOTE — DISCHARGE INSTRUCTIONS
Stop taking over the counter motion sickness medications when you do not have nausea associated with the vomiting.

## 2019-03-12 ENCOUNTER — OFFICE VISIT (OUTPATIENT)
Dept: NEUROLOGY | Facility: CLINIC | Age: 39
End: 2019-03-12
Payer: COMMERCIAL

## 2019-03-12 VITALS — HEART RATE: 75 BPM | DIASTOLIC BLOOD PRESSURE: 84 MMHG | SYSTOLIC BLOOD PRESSURE: 160 MMHG

## 2019-03-12 DIAGNOSIS — H47.11 PAPILLEDEMA ASSOCIATED WITH INCREASED INTRACRANIAL PRESSURE: ICD-10-CM

## 2019-03-12 DIAGNOSIS — N93.9 ABNORMAL UTERINE BLEEDING (AUB): ICD-10-CM

## 2019-03-12 PROCEDURE — 99214 PR OFFICE/OUTPT VISIT, EST, LEVL IV, 30-39 MIN: ICD-10-PCS | Mod: S$GLB,,, | Performed by: PSYCHIATRY & NEUROLOGY

## 2019-03-12 PROCEDURE — 99999 PR PBB SHADOW E&M-EST. PATIENT-LVL I: CPT | Mod: PBBFAC,,, | Performed by: PSYCHIATRY & NEUROLOGY

## 2019-03-12 PROCEDURE — 99999 PR PBB SHADOW E&M-EST. PATIENT-LVL I: ICD-10-PCS | Mod: PBBFAC,,, | Performed by: PSYCHIATRY & NEUROLOGY

## 2019-03-12 PROCEDURE — 99214 OFFICE O/P EST MOD 30 MIN: CPT | Mod: S$GLB,,, | Performed by: PSYCHIATRY & NEUROLOGY

## 2019-03-12 RX ORDER — NAPROXEN SODIUM 220 MG/1
81 TABLET, FILM COATED ORAL DAILY
Status: SHIPPED | OUTPATIENT
Start: 2019-03-13

## 2019-03-12 NOTE — PROGRESS NOTES
Subjective:       Patient ID: Bijal Molina is a 38 y.o. female.    Chief Complaint: No chief complaint on file.    Patient is recovering well. Patient is just getting back to work. Patient working 3 days/week full time.    LP (-) for psuedotumor. Has stroke in left occipital lobe.  IR Angiogram :  Six vessel cerebral angiogram demonstrated no significant vascular abnormality to account for patient's abnormality on MRI.  No evidence of high-grade stenosis, vasculitis, large vessel occlusion or major dural venous sinus thrombosis.    Cerebral venogram demonstrated mild narrowing at the transverse sigmoid junctions bilaterally.  Review of Systems   Constitutional: Positive for fatigue.   Neurological: Positive for weakness and headaches.       Objective:      Physical Exam   Constitutional: She is oriented to person, place, and time. She appears well-developed and well-nourished.   HENT:   Head: Normocephalic and atraumatic.   Eyes: EOM are normal. Pupils are equal, round, and reactive to light.   Cardiovascular: Normal rate and regular rhythm.   Pulmonary/Chest: Effort normal and breath sounds normal.   Neurological: She is alert and oriented to person, place, and time.   Vitals reviewed.      Assessment:       Problem List Items Addressed This Visit        Ophtho    Papilledema associated with increased intracranial pressure    Current Assessment & Plan     No evidence of increased intracranial pressure on LP. HA's continue but better. Stop Diamox.  Will follow-up in 3 months           Other Visit Diagnoses     Abnormal uterine bleeding (AUB)            Plan:

## 2019-03-19 NOTE — ASSESSMENT & PLAN NOTE
No evidence of increased intracranial pressure on LP. HA's continue but better. Stop Diamox.  Will follow-up in 3 months

## 2019-08-02 DIAGNOSIS — Z12.39 BREAST CANCER SCREENING: ICD-10-CM

## 2020-03-17 DIAGNOSIS — I10 ESSENTIAL HYPERTENSION: ICD-10-CM

## 2020-03-17 RX ORDER — AMLODIPINE BESYLATE 10 MG/1
TABLET ORAL
Qty: 30 TABLET | Refills: 1 | Status: SHIPPED | OUTPATIENT
Start: 2020-03-17

## 2020-05-29 ENCOUNTER — PATIENT MESSAGE (OUTPATIENT)
Dept: ADMINISTRATIVE | Facility: HOSPITAL | Age: 40
End: 2020-05-29

## 2020-10-02 DIAGNOSIS — Z12.31 OTHER SCREENING MAMMOGRAM: ICD-10-CM

## 2020-10-05 ENCOUNTER — PATIENT MESSAGE (OUTPATIENT)
Dept: ADMINISTRATIVE | Facility: HOSPITAL | Age: 40
End: 2020-10-05

## 2021-01-04 ENCOUNTER — PATIENT MESSAGE (OUTPATIENT)
Dept: ADMINISTRATIVE | Facility: HOSPITAL | Age: 41
End: 2021-01-04

## 2021-04-06 ENCOUNTER — PATIENT MESSAGE (OUTPATIENT)
Dept: ADMINISTRATIVE | Facility: HOSPITAL | Age: 41
End: 2021-04-06

## 2021-04-26 ENCOUNTER — PATIENT MESSAGE (OUTPATIENT)
Dept: RESEARCH | Facility: HOSPITAL | Age: 41
End: 2021-04-26

## 2021-05-05 NOTE — HPI
38 year old female presents to the ED for evaluation of right sided vision field blurriness from ophthalmology clinic. The patient with history of multiple eye issues over the last month for which she was seeing Dr Colvin for. She was found on 10/3 to have increased edema on the ocular disc which prompted MRV and MRI. They did not see a mass lesion but was concerned for venous stenosis. The plan was to refer her to Dr Mccabe for potential stent for idiopathic intercranial edema.     The patient reports symptoms of right sided bilateral blurriness with headache x 1 week after waking up from surgery. The patient reports she did not seek medical attention sooner as she knew she had a follow up with Dr Colvin today. She denies any weakness, speech changes, confusion, or numbness outside of generalized weakness post hysterectomy 1 week ago.   Symptoms have been constant.   No attempted treatment prior to arrival and no identifiable aggravators     Care Transitions Initial Follow Up Call    Call within 2 business days of discharge: Yes     Patient: Dontae Mccullough Patient : 2006 MRN: [id number]    Last Discharge [address]       Complaint Diagnosis Description Type Department Provider    21  Transient alteration of awareness . .. Admission (Discharged) Freeman Nyhan, MD          Was this an external facility discharge? No Discharge Facility: NA    Challenges to be reviewed by the provider   Additional needs identified to be addressed with provider no  none         Method of communication with provider : chart routing    Discussed COVID-19 related testing which was available at this time. Test results were negative. Patient informed of results, if available? yes     Advance Care Planning:   Does patient have an Advance Directive: NA - pediatric patient     Inpatient Readmission Risk score: Unplanned Readmit Risk Score: 7    Was this a readmission? no   Patient stated reason for the admission: altered mental status, gait dysturbance    Patients top risk factors for readmission: medical condition and asthma  Interventions to address risk factors: Obtained and reviewed discharge summary and/or continuity of care documents    Care Transition Nurse (CTN) contacted the parent by telephone to perform post hospital discharge assessment. Verified name and  with parent as identifiers. Provided introduction to self, and explanation of the CTN role. CTN reviewed discharge instructions, medical action plan and red flags with parent who verbalized understanding. Were discharge instructions available to patient? yes. Reviewed appropriate site of care based on symptoms and resources available to patient including: PCP. Parent given an opportunity to ask questions and does not have any further questions or concerns at this time. The parent agrees to contact the PCP office for questions related to their healthcare.      Medication reconciliation was performed with parent, who verbalizes understanding of administration of home medications. Advised obtaining a 90-day supply of all daily and as-needed medications. Referral to Pharm D needed: no     Home Health/Outpatient orders at discharge: [facility]: [facility]   Date of initial visit: already started    [employer] ordered at discharge: None  [address]: NA  Durable Medical Equipment received: NA    Covid Risk Education    Patient has following risk factors of: asthma. Education provided regarding infection prevention, and signs and symptoms of COVID-19 and when to seek medical attention with parent who verbalized understanding. Discussed exposure protocols and quarantine From CDC: Are you at higher risk for severe illness?  and given an opportunity for questions and concerns. The parent agrees to contact the COVID-19 hotline [phone] or PCP office for questions related to COVID-19. For more information on steps you can take to protect yourself, see CDC's How to Protect Yourself     Was patient discharged with a pulse oximeter? no Discussed and confirmed pulse oximeter discharge instructions and when to notify provider or seek emergency care. Patient/family/caregiver given information for [employer] and agrees to enroll no  Patient's preferred e-mail: declines  Patient's preferred phone number: declines    Discussed follow-up appointments. If no appointment was previously scheduled, appointment scheduling offered: yes Is follow up appointment scheduled within 7 days of discharge? yes   [address] follow up appointment(s):   Future Appointments   Date Time Provider Stefanie Mercer   5/6/2021  1:00 PM Bhupinder Haynes MD [facility]     Non-BS follow up appointment(s): [facility]    Plan for follow-up call in 7-10 days based on severity of symptoms and risk factors.   Plan for next call: referrals-Peds Neurology  CTN provided contact information for future needs. Goals Addressed                 This Visit's Progress       Post Hospitalization     Attends follow-up appointments as directed. 5/5/21: Patient was instructed to follow up with her PCP within 48 hours of discharge. Mother has contacted office and appointment has been scheduled. Additionally, parent was asked to follow up with both Peds Neuro and ENT post discharge. Mother expressed that she wants to wait on these appointments. P: Agus Pelayo will attend her hospital follow up appointment with Dr. Nicki Floyd. JN       Supportive resources in place to maintain patient in the community (ie. Home Health, DME equipment, refer to, medication assistant plan, etc.)        5/5/21: Patient is working with [facility] PT to help with her gait disturbance. Mother stated that Shannan Martin left leg is still having some difficulty. P: Agus Pelayo will continue to attend her regularly scheduled PT appointments.  Belkis Juan

## 2021-06-22 ENCOUNTER — DOCUMENTATION ONLY (OUTPATIENT)
Dept: BARIATRICS | Facility: CLINIC | Age: 41
End: 2021-06-22

## 2021-06-23 ENCOUNTER — TELEPHONE (OUTPATIENT)
Dept: BARIATRICS | Facility: CLINIC | Age: 41
End: 2021-06-23

## 2021-06-26 ENCOUNTER — DOCUMENTATION ONLY (OUTPATIENT)
Dept: BARIATRICS | Facility: CLINIC | Age: 41
End: 2021-06-26

## 2021-07-05 ENCOUNTER — HOSPITAL ENCOUNTER (EMERGENCY)
Facility: HOSPITAL | Age: 41
Discharge: HOME | End: 2021-07-05
Attending: EMERGENCY MEDICINE
Payer: COMMERCIAL

## 2021-07-05 VITALS
OXYGEN SATURATION: 99 % | DIASTOLIC BLOOD PRESSURE: 79 MMHG | TEMPERATURE: 98 F | HEIGHT: 68 IN | BODY MASS INDEX: 44.41 KG/M2 | RESPIRATION RATE: 17 BRPM | WEIGHT: 293 LBS | HEART RATE: 82 BPM | SYSTOLIC BLOOD PRESSURE: 152 MMHG

## 2021-07-05 DIAGNOSIS — R52 PAIN: ICD-10-CM

## 2021-07-05 DIAGNOSIS — M25.569 KNEE PAIN, UNSPECIFIED CHRONICITY, UNSPECIFIED LATERALITY: Primary | ICD-10-CM

## 2021-07-05 DIAGNOSIS — L03.012 PARONYCHIA OF LEFT MIDDLE FINGER: ICD-10-CM

## 2021-07-05 LAB — POCT GLUCOSE: 168 MG/DL (ref 70–110)

## 2021-07-05 PROCEDURE — 82962 GLUCOSE BLOOD TEST: CPT

## 2021-07-05 PROCEDURE — 99285 EMERGENCY DEPT VISIT HI MDM: CPT | Mod: ,,, | Performed by: EMERGENCY MEDICINE

## 2021-07-05 PROCEDURE — 96372 THER/PROPH/DIAG INJ SC/IM: CPT

## 2021-07-05 PROCEDURE — 99284 EMERGENCY DEPT VISIT MOD MDM: CPT | Mod: 25

## 2021-07-05 PROCEDURE — 63600175 PHARM REV CODE 636 W HCPCS: Performed by: EMERGENCY MEDICINE

## 2021-07-05 PROCEDURE — 99285 PR EMERGENCY DEPT VISIT,LEVEL V: ICD-10-PCS | Mod: ,,, | Performed by: EMERGENCY MEDICINE

## 2021-07-05 PROCEDURE — 25000003 PHARM REV CODE 250: Performed by: EMERGENCY MEDICINE

## 2021-07-05 RX ORDER — LIDOCAINE HYDROCHLORIDE 10 MG/ML
5 INJECTION, SOLUTION EPIDURAL; INFILTRATION; INTRACAUDAL; PERINEURAL
Status: COMPLETED | OUTPATIENT
Start: 2021-07-05 | End: 2021-07-05

## 2021-07-05 RX ORDER — KETOROLAC TROMETHAMINE 30 MG/ML
15 INJECTION, SOLUTION INTRAMUSCULAR; INTRAVENOUS
Status: COMPLETED | OUTPATIENT
Start: 2021-07-05 | End: 2021-07-05

## 2021-07-05 RX ORDER — OXYCODONE AND ACETAMINOPHEN 5; 325 MG/1; MG/1
1 TABLET ORAL
Status: COMPLETED | OUTPATIENT
Start: 2021-07-05 | End: 2021-07-05

## 2021-07-05 RX ADMIN — LIDOCAINE HYDROCHLORIDE 50 MG: 10 INJECTION, SOLUTION EPIDURAL; INFILTRATION; INTRACAUDAL at 01:07

## 2021-07-05 RX ADMIN — OXYCODONE HYDROCHLORIDE AND ACETAMINOPHEN 1 TABLET: 5; 325 TABLET ORAL at 01:07

## 2021-07-05 RX ADMIN — KETOROLAC TROMETHAMINE 15 MG: 30 INJECTION, SOLUTION INTRAMUSCULAR; INTRAVENOUS at 01:07

## 2021-07-07 ENCOUNTER — HOSPITAL ENCOUNTER (OUTPATIENT)
Dept: RADIOLOGY | Facility: OTHER | Age: 41
Discharge: HOME | End: 2021-07-07
Attending: INTERNAL MEDICINE
Payer: COMMERCIAL

## 2021-07-07 DIAGNOSIS — Z12.31 OTHER SCREENING MAMMOGRAM: ICD-10-CM

## 2021-07-07 PROCEDURE — 77067 SCR MAMMO BI INCL CAD: CPT | Mod: TC

## 2021-07-07 PROCEDURE — 77063 MAMMO DIGITAL SCREENING BILAT WITH TOMO: ICD-10-PCS | Mod: 26,,, | Performed by: RADIOLOGY

## 2021-07-07 PROCEDURE — 77067 MAMMO DIGITAL SCREENING BILAT WITH TOMO: ICD-10-PCS | Mod: 26,,, | Performed by: RADIOLOGY

## 2021-07-07 PROCEDURE — 77067 SCR MAMMO BI INCL CAD: CPT | Mod: 26,,, | Performed by: RADIOLOGY

## 2021-07-07 PROCEDURE — 77063 BREAST TOMOSYNTHESIS BI: CPT | Mod: 26,,, | Performed by: RADIOLOGY

## 2022-03-07 ENCOUNTER — IMMUNIZATION (OUTPATIENT)
Dept: PRIMARY CARE CLINIC | Facility: CLINIC | Age: 42
End: 2022-03-07
Payer: COMMERCIAL

## 2022-03-07 DIAGNOSIS — Z23 NEED FOR VACCINATION: Primary | ICD-10-CM

## 2022-03-07 PROCEDURE — 91306 COVID-19, MRNA, LNP-S, PF, 100 MCG/0.25 ML DOSE VACCINE (MODERNA BOOSTER): CPT | Mod: PBBFAC | Performed by: INTERNAL MEDICINE

## 2022-03-07 PROCEDURE — 0064A COVID-19, MRNA, LNP-S, PF, 100 MCG/0.25 ML DOSE VACCINE (MODERNA BOOSTER): CPT | Mod: CV19,PBBFAC | Performed by: INTERNAL MEDICINE

## 2023-06-11 NOTE — ASSESSMENT & PLAN NOTE
Being followed by Dr Colvin. MRI and MRV prior reviewed   Noted to have bilateral right sided blurry vision today   Sent to the ED for stroke work up    bed rails

## (undated) DEVICE — SUT MONOCRYL 4-0 PS-2

## (undated) DEVICE — PAD SANITARY OB STERILE

## (undated) DEVICE — SEE MEDLINE ITEM 154981

## (undated) DEVICE — Device

## (undated) DEVICE — CANISTER SUCTION 2 LTR

## (undated) DEVICE — ELECTRODE BLADE TEFLON 6

## (undated) DEVICE — SUT 2/0 36IN COATED VICRYL

## (undated) DEVICE — SEE MEDLINE ITEM 152622

## (undated) DEVICE — SUT 1 18IN COATED VICRYL UN

## (undated) DEVICE — CLOSURE SKIN STERI STRIP 1/2X4

## (undated) DEVICE — BLANKET UPPER BODY 78.7X29.9IN

## (undated) DEVICE — TRAY FOLEY 16FR INFECTION CONT

## (undated) DEVICE — GAUZE SPONGE 4X4 12PLY

## (undated) DEVICE — COVER OVERHEAD SURG LT BLUE

## (undated) DEVICE — PAD ABD 8X10 STERILE

## (undated) DEVICE — SUT 1 36IN COATED VICRYL UN

## (undated) DEVICE — SUT VICRYL 1 CT-1 27 UNDIE

## (undated) DEVICE — SLEEVE SCD EXPRESS CALF MEDIUM

## (undated) DEVICE — SUT 1 18IN COATED VICRYL U

## (undated) DEVICE — SOL NACL 0.9% INJ PF/100 150

## (undated) DEVICE — CLEANER TIP ELECSURG 2X2IN

## (undated) DEVICE — SYR 50CC LL

## (undated) DEVICE — APPLICATOR CHLORAPREP ORN 26ML

## (undated) DEVICE — GLOVE SURG BIOGEL LATEX SZ 7.5

## (undated) DEVICE — ELECTRODE REM PLYHSV RETURN 9

## (undated) DEVICE — PACK ENDOSCOPY GENERAL

## (undated) DEVICE — STAPLER SKIN ROTATING HEAD

## (undated) DEVICE — SEE MEDLINE ITEM 146417

## (undated) DEVICE — DRESSING ABSRBNT ISLAND 3.6X8

## (undated) DEVICE — SPONGE LAP 18X18 PREWASHED

## (undated) DEVICE — SOL WATER STRL IRR 1000ML

## (undated) DEVICE — DEVICE ENSEAL X1 LARGE JAW